# Patient Record
Sex: FEMALE | Race: WHITE | Employment: FULL TIME | URBAN - METROPOLITAN AREA
[De-identification: names, ages, dates, MRNs, and addresses within clinical notes are randomized per-mention and may not be internally consistent; named-entity substitution may affect disease eponyms.]

---

## 2017-04-19 NOTE — PATIENT INSTRUCTIONS
BARON Wood    Weight loss clinic  Bariatric surgery    [address] [facility] [address], [address]   Phone: [phone]   Fax: 23 [facility]  [address] [address] [place], [place]

## 2017-04-19 NOTE — PROGRESS NOTES
CC: Obesity, dysmenorrhea. HPI:  Pt presents for obesity problem. Pt has it for years, family history of morbid obesity. Risk factors: sedentary life style. Makes better: low calories diet and physical activities.   Makes worse: sugar craving a mood, affect. No orders of the defined types were placed in this encounter. ASSESSMENT AND PLAN:     Morbid obesity - Discussed BMI target.  Discussed calorie counting, diet, exercise, and one pound of weight loss per week by decreasing calorie in

## 2017-04-25 NOTE — ED NOTES
Patient instructed on how to use crutches, told tech she knows how to use them and refused instruction, walked out dragging crutches under arm

## 2017-04-25 NOTE — ED PROVIDER NOTES
Patient Seen in: [facility] Emergency Department In [place]    History   Patient presents with:  Lower Extremity Injury (musculoskeletal)    Stated Complaint: Left knee pain    HPI    71-year-old female that comes the hospital with complaint of having lef and agreed except as otherwise stated in HPI.     Physical Exam       ED Triage Vitals   BP 04/25/17 1553 153/75 mmHg   Pulse 04/25/17 1553 105   Resp 04/25/17 1553 18   Temp 04/25/17 1553 98.9 °F (37.2 °C)   Temp src 04/25/17 1553 Temporal   SpO2 --    O2 [address], [address] [facility] [address], [address] [address]  [phone]    Schedule an appointment as soon as possible for a visit in 2 days        Medications Prescribed:  Current Discharge Medication List    ST

## 2017-06-01 NOTE — PATIENT INSTRUCTIONS
Diony Espino, [address] [facility] [address], [address]  [phone]      Devika Lundberg 1640 Clinician,  [address] [place] [address] [facility]    Phone: [phone]  Fax: [phone]    Dr. Vickey Sawyer

## 2017-06-01 NOTE — PROGRESS NOTES
Laurie Smart is a 25year old female. Patient presents with:  Depression: Referral requested      HPI:   Pt here to with a new complain of depressed mood, anxiaty.  Pt feels down, no energy, sensee of failure, crying, loss of interest. This problem la sensation.     EXAM:   /78 mmHg  Pulse 86  Temp(Src) 98.7 °F (37.1 °C) (Oral)  Resp 22  Ht 65.5\"  Wt 269 lb  BMI 44.07 kg/m2  SpO2 100%  LMP 03/21/2017  GENERAL: well developed, well nourished,in no apparent distress  SKIN: no rashes,no suspicious le for treatment with medications. Risks and benefits of medication(s) discussed in detail, including possible worsening of depression and risk of suicidal thoughts.  If severe alterations in mentation occur patient instructed to stop medication and call offi

## 2017-06-05 NOTE — TELEPHONE ENCOUNTER
----- Message from Ruth Ann Luna MD sent at 6/2/2017  2:56 PM CDT -----  No STD, not immune to Hep B, needs the series.

## 2017-06-05 NOTE — TELEPHONE ENCOUNTER
Discussed results with patient and she will start the Hep B series when she comes to see Dr. Cazares Linear  7-3.

## 2017-07-03 PROBLEM — E66.01 MORBID OBESITY DUE TO EXCESS CALORIES (HCC): Status: ACTIVE | Noted: 2017-07-03

## 2017-07-03 NOTE — PROGRESS NOTES
CC: Patient presents with: Anxiety: f/u and weight check         HPI:  Pt lost 20  lbs. Pt exercises 3 times a week, watches carefully healthy diet. No chest pains, SOB, no palpitations. Tolerates Phentermine very well.     Pt  F/u anxiety, feeling Pulse 72   Temp 98.6 °F (37 °C) (Oral)   Ht 65.5\"   Wt 261 lb   BMI 42.77 kg/m²   GENERAL: well developed, well nourished,in no apparent distress  HEENT: atraumatic, normocephalic, throat is clear, PERRL  LUNGS: clear to auscultation  CARDIO: RRR without

## 2017-07-21 NOTE — PROGRESS NOTES
CHIEF COMPLAINT:   Patient presents with:  Ear Problem: Possible ear infection,Jaw pain. HPI:   Familia Sprevangelina is a 25year old female who presents for left ear pain for  3 days. Patient reports pain radiates to her left jaw.   She has been swimmin Smokeless tobacco: Never Used                      Comment: 4 cig per day  Alcohol use:  No                  REVIEW OF SYSTEMS:   GENERAL: Normal appetite  SKIN: no rashes or abnormal skin lesions  HEENT: See HPI  LUNGS: denies shortness of breath or wheezi Tory Loco is a 25year old female who presents with symptoms that are consistent with    ASSESSMENT:   Acute suppurative otitis media of left ear without spontaneous rupture of tympanic membrane, recurrence not specified  (primary encounter diagnosis · A cotton ball may be loosely placed in the outer ear to absorb any drainage. · You may use acetaminophen or ibuprofen to control pain, unless another medication was prescribed.  Note: If you have chronic liver or kidney disease or ever had a stomach ulce

## 2017-08-28 NOTE — PROGRESS NOTES
S:    Patient reports she has chest pressure x 2 days. She has also been coughing for 2 days. Patient reports she unable to sleep. Patient also reports wheezing and SOB at rest. No fevers, sore throat, sinus pain/pressure.     Patient did vomit once fro

## 2017-08-28 NOTE — ED PROVIDER NOTES
Patient Seen in: THE [facility] Emergency Department In [place]    History   Patient presents with:  Cough/URI    Stated Complaint: cough cold congestion     HPI    CHIEF COMPLAINT: Cough, congestion, chest pressure    HISTORY OF PRESENT ILLNESS: Patient is a rhinitis, cause unspecified    • B[facility]     NO MEDS DURING PREG   • Bronchitis, not specified as acute or chronic    • Gestational diabetes    • Infectious mononucleosis    • Otitis    • Pneumonia, organism unspecified(486)     as  141/94  Pulse: 93  Resp: 20  Temp: 98.7 °F (37.1 °C)  Temp src: Temporal  SpO2: 95 %  O2 Device: None (Room air)    Current:/94   Pulse 93   Temp 98.7 °F (37.1 °C) (Temporal)   Resp 20   Ht 167.6 cm (5' 6\")   Wt 118.4 kg   SpO2 95%   BMI 42.13 kg/m² Abnormality         Status                     ---------                               -----------         ------                     CBC W/ DIFFERENTIAL[[id number]]          Abnormal            Final result                 Please view r List    START taking these medications    Doxycycline Hyclate 100 MG Oral Cap  Take 1 capsule (100 mg total) by mouth 2 (two) times daily.   Qty: 20 capsule Refills: 0    albuterol sulfate (2.5 MG/3ML) 0.083% Inhalation Nebu Soln  Take 3 mL (2.5 mg total) b

## 2017-08-28 NOTE — ED INITIAL ASSESSMENT (HPI)
Cough, cold congestion for 2 days, no fever, went to urgent care, had breathing treatment \"that didn't help\"

## 2017-08-31 PROBLEM — J18.9 PNEUMONIA: Status: ACTIVE | Noted: 2017-08-31

## 2017-08-31 PROBLEM — F41.9 ANXIETY: Chronic | Status: ACTIVE | Noted: 2017-08-31

## 2017-08-31 NOTE — H&P
JOSE ANTONIO HOSPITALIST                                                               History & Physical         Jovana Snowball Patient Status:  Inpatient    1995 MRN [id number]   [facility] 5NW-A Attending Pedrito Canela MD   [address] Day # 0 Heart Attack Father    • Hypertension Father    • Other [OTHER] Maternal Grandmother    • Diabetes Paternal Grandmother    • Diabetes Maternal Grandfather        Social history:   reports that she quit smoking 7 days ago. She quit after 4.00 years of use. murmurs. Equal pulses   Abdomen: Soft, nontender, nondistended. Positive bowel sounds. No rebound tenderness  Neurologic: No focal neurological deficits. Musculoskeletal: Full range of motion of all extremities. No swelling noted.   Integument: No lesion status: Full  · Iyer: None    Plan of care discussed with patient, RN      Received signout from my hospitalist colleague who discussed with regular primary care physician Dr. Rayleen Boxer regarding  admission.   Dr. Angelia Stone will follow from morning sachin

## 2017-08-31 NOTE — PROGRESS NOTES
NURSING ADMISSION NOTE      Patient admitted via Wheelchair  Oriented to room. Safety precautions initiated. Bed in low position. Call light in reach. Patient admitted to room 519. Admission completed. MD notified.  Oxygen saturation 96% on room a

## 2017-08-31 NOTE — PROGRESS NOTES
Patient presents with:  ER F/U: THE [facility] Emergency room f/u from 8/28/17 pneumonia      HPI:   Fidelina Dave is a 25year old female who presents for cough  for  7  days.  Patient reports congestion, low grade fever, dry cough, cough is keeping pt up at nig of Onset   • Diabetes Father    • Heart Disorder Father    • Heart Attack Father    • Other [OTHER] Maternal Grandmother    • Diabetes Paternal Grandmother    • Diabetes Maternal Grandfather       Smoking status: Current Every Day Smoker Sodium Succ (Solu-MEDROL) injection 125 mg; Inject 2 mL (125 mg total) into the muscle once. -     ipratropium-albuterol (DUONEB) nebulizer solution 3 mL; Take 3 mL by nebulization once.      Smoking  -     MethylPREDNISolone Sodium Succ (Solu-MEDROL) inj

## 2017-08-31 NOTE — PROGRESS NOTES
[facility] Progress Note:  Anticoagulation Weight Dose Adjustment for enoxaparin (LOVENOX)    Chu Han is a 25year old female who has been prescribed enoxaparin (LOVENOX) 40 mg subcutaneous daily for VTE prophylaxis.       Estimated Creatinine Jl

## 2017-09-01 NOTE — PROGRESS NOTES
09/01/17 0645   Provider Notification   Reason for Communication Critical value   Test/Procedure Name respiratory panel   Provider Name dr Mau Hanley of Communication Page   Response Waiting for response   Notification Time 0645   dr Dereck thomas

## 2017-09-01 NOTE — PROGRESS NOTES
NURSING DISCHARGE NOTE    Discharged Home via Wheelchair. Accompanied by Support staff  Belongings Taken by patient/family. Patient alert and oriented. VSS. Iv discontinued. Discharge instructions and prescriptions given to patient.  She verbalized

## 2017-09-01 NOTE — PROGRESS NOTES
09/01/17 0645   Provider Notification   Reason for Communication Critical value   Test/Procedure Name respiratory panel   Provider Name dr Tati Farr of Communication Page   Response Waiting for response   Notification Time 0645   Dr Nolen Lennox p

## 2017-09-01 NOTE — PAYOR COMM NOTE
--------------  ADMISSION REVIEW         8/31         Attending Nicolette Bravo MD    Hosp Day # 0 PCP Linda Tabares MD      Chief Complaint: Shortness of breath, wheezing, Cough     History of Present Illness:  Angela Lares is a 25year old female ad noted.  Integument: No lesions. No erythema.  CXR  SLIGHT INFILTRATE VS ATELECTASIS LEFT LUNG BASE    LABS  + RHINOVIRUS  C02 17    ASSESSMENT / PLAN:      1. Left lower lobe pneumonia with acute bronchitis and bronchospasm  FAILED OP ABX,PREDNISONE  2.

## 2017-09-01 NOTE — PROGRESS NOTES
Patient feeling better; sating at 97% on room air; lungs clear bilaterally to auscultation; wants to go home. Will d/c today on levaquin,steroids. She improved quicker than expected for diagnosis of pneumonia.     Shaila Davila MD  [facility]  Intern

## 2017-09-02 NOTE — DISCHARGE SUMMARY
[facility] [place] HOSPITALIST  DISCHARGE SUMMARY     [facility] Patient Status:  Inpatient    1995 MRN [id number]   [facility] 5NW-A Attending Diya Kitchen MD   Hosp Day # 1 PCP Silverio Barnard MD     Date of Admission: 2017  Date o Denies any dysuria frequency. Brief Synopsis: admitted for symptoms shown above in HPI, suspected to have both bacterial pneumonia and rhino/enterovirus bronchitis, the latter being found on pcr.   Started on azithromycin and steroids and seemed to do w 9/27/2017  Quantity:  30 capsule  Refills:  0     clonazePAM 0.25 MG Tbdp  Commonly known as:  KLONOPIN      Take 1 tablet (0.25 mg total) by mouth 2 (two) times daily as needed.    Quantity:  60 tablet  Refills:  5        STOP taking these medications

## 2017-09-27 NOTE — PROGRESS NOTES
Patient presents with:  Hospital F/U: [facility] admission Pneumonia 8/ 31/2017       HPI:   Chino Jonyer is a 25year old female who presents for pneumonia f/u from the hospital.  Cough is improving, now mild,intermittent, pt still on antibiotics. Grandfather       Smoking status: Former Smoker                                                              Packs/day: 0.00      Years: 4.00         Quit date: 8/24/2017  Smokeless tobacco: Never Used                      Comment: 4 cig per day  Alcohol u above plan and agreed  Referral to gyn for IUD, no Phentermine.     Signed Prescriptions Disp Refills    Phentermine HCl 37.5 MG Oral Tab 30 tablet 0      Sig: Take 1 tablet (37.5 mg total) by mouth every morning before breakfast.         Pneumonia LLL: res

## 2017-09-27 NOTE — PATIENT INSTRUCTIONS
Ya Ulloa MD  Obstetrics/Gynecology     [address] [facility]  Phone: [phone]  Fax: [phone]    [address]  Phone: [phone]  Fax: [phone]

## 2017-09-28 NOTE — TELEPHONE ENCOUNTER
PT had appointment with doctor on 9/27/17 and was told she needs to take Wellbutrin and a water pill. PT wasn't sure about the spelling of either one. Please call PT back. Can call her back on her cell phone.

## 2017-09-28 NOTE — TELEPHONE ENCOUNTER
Patient seen in office yesterday. Given scripts for phentermine and alprazolam.     1. Is she to continue on bupropion? 2. Is she to start a diuretic? Please advise. Thank you!

## 2017-11-06 PROBLEM — E66.01 MORBID OBESITY (HCC): Status: ACTIVE | Noted: 2017-11-06

## 2017-11-06 NOTE — PROGRESS NOTES
Tory Loco is a 25year old female. Patient presents with: Follow - Up      HPI:   Pt complains of symptoms of anxiety.    Has been having these sx's for feeling anxious almost every day, palpitations, feeling fullness in the stomach, closing throa cig per day  Alcohol use:  Yes              Comment: socially        REVIEW OF SYSTEMS:   GENERAL HEALTH: feels well otherwise  RESPIRATORY: denies shortness of breath with exertion  CARDIOVASCULAR: denies chest pain on exertion  GI: denies abdominal pain a by decreasing calorie intake by 500 calories per day. Handout given. . All side effects of Phentermine d/w the pt, heart problems possible  in the future, not known effect were explained. Exercise at least 3 times per week, 30min and low calories diet d/w t

## 2017-11-06 NOTE — PATIENT INSTRUCTIONS
Dr. Illene Carrel Gynecology    [address] [facility] [address]  [place], [address]  [phone] [address] [facility]  [address]  [place], [place]  Phone: [phone] [facility], [address]  Phone: [phone].

## 2017-12-26 NOTE — TELEPHONE ENCOUNTER
Dr. Ness Cons  Dr. Kaylie Le. [address] [facility] [address]  [place], [address]  [phone]    [address] [facility]  [address]  [place], [place]  Phone: [phone] [place] [address], [address]

## 2017-12-26 NOTE — TELEPHONE ENCOUNTER
Pt requesting contact information for ob/gyn she was referred to during her OV. Please advise thank you.

## 2018-01-10 NOTE — PROGRESS NOTES
CC: Patient presents with:  Medication Follow-Up         HPI:  Pt lost 9  lbs. Pt exercises 3 times a week, watches carefully healthy diet. No chest pains, SOB, no palpitations. Tolerates Phentermine very well.     Rash, breast bilaterally, some itch Anxiety     Morbid obesity ([facility] [address] [facility]        REVIEW OF SYSTEMS:   RESPIRATORY: denies shortness of breath with exertion  CARDIOVASCULAR: denies chest pain on exertion, palpitations. SKIN: as above  PSYCH: no low self est. No depression.     EXAM:   /76

## 2018-01-23 NOTE — PROGRESS NOTES
CHIEF COMPLAINT:   Patient presents with:  Urinary: pressure and urgency 3 days. HPI:   Silva Paget is a 25year old female who presents with symptoms of UTI. Complaining of urinary frequency, urgency, and dysuria for last 3 days.  Symptoms have be • Pneumonia, organism unspecified(486)     as     • Swollen tonsil       Social History:  Smoking status: Current Every Day Smoker                                                   Packs/day: 0.00      Years: 4.00         Last attempt to quit:  Elsa Crews is a 25year old female presents with UTI symptoms. ASSESSMENT:  Dysuria  (primary encounter diagnosis)    PLAN: Meds as listed below with meals. Rest and increase fluid intake. Comfort measures as described in Patient Instructions.   [facility] · Pyelonephritis. This is a kidney infection. If not treated, it can be serious and damage your kidneys. In severe cases, you may need to stay in the hospital. You may have a fever and lower back pain.   Medicines to treat a UTI  Most UTIs are treated with Date Last Reviewed: 1/1/2017  © 9486-6249 [employer] [address]. [address] [facility], [address] [place]. All rights reserved. This information is not intended as a substitute for professional medical care.  Always follow your healthcare professional'

## 2018-01-24 NOTE — TELEPHONE ENCOUNTER
Patient states she was seen through the walk in and her pain is way worse today it is unbearable pain in her lower back patient would like to know what she should do at this point

## 2018-01-24 NOTE — TELEPHONE ENCOUNTER
Patient seen in [facility] yesterday for UTI. Prescribed Macrobid and Pyridium. Had mild lower back pain at the time. Has started medications. Today back pain has significantly  increased rating 6-7/10. Feels like a pressure going half way up her back.  Has been u

## 2018-01-25 NOTE — TELEPHONE ENCOUNTER
Unable to reach patient at number on file. Left message to call back. Will vivien for f/u to call patient back later this afternoon if she doesn't return call.

## 2018-01-25 NOTE — TELEPHONE ENCOUNTER
Please see below. Patient's urine culture was negative for UTI. She was switched to Cipro yesterday d/t complaints of increasing low back pain. She is still symptomatic. Should she d/c Cipro? Any additional orders for patient?

## 2018-01-25 NOTE — TELEPHONE ENCOUNTER
Pt called, she just received a call from [facility] with her test results: she is negative for a UTI. Dr. Rigo Hawkins changed her Rx yesterday to Cipro, but now she is wondering: if its not a UTI, will this antibiotic even help?  She is still experiencing the pain, so she w

## 2018-01-25 NOTE — TELEPHONE ENCOUNTER
Patient called back. States no stat appointments available today. Offered patient note for work, states employer doesn't care if she brings in note. Patient advised that per provider ER was also an option for the testing.  Patient began to cry and stated

## 2018-01-25 NOTE — TELEPHONE ENCOUNTER
Spoke to patient. Advised of recommendations below. Provided with number to central scheduling to set up STAT KUB. Patient will call to schedule today.  Will try to have labs done today as well, though she states she is working until 4 and doesn't think she

## 2018-01-26 NOTE — ED NOTES
Pt expressed to RN that she can't wait much longer due to her child that is with her. Pt willing to wait 15 more minutes for final CT results. Dr Arnaldo Chase aware. PIV dc.

## 2018-01-26 NOTE — ED NOTES
Pt crying stating \"I wasted 4 hours for nothing\" RN tried explaining dx to patient, patient's son, throwing gloves at [facility] and talking loudly. Pt told RN \" I just want to go\" RN told patient about meds and to follow up ,if she becomes worse to return.  Pt

## 2018-01-26 NOTE — TELEPHONE ENCOUNTER
Pt states she's in a lot of pain. Pt states she was here for a UTI but it wasn't a UTI. Pt would like to know what she should do. Pls call pt.

## 2018-01-26 NOTE — ED PROVIDER NOTES
Patient Seen in: THE [facility] Emergency Department In [place]    History   Patient presents with:  Abdomen/Flank Pain (GI/)    Stated Complaint: UTI symptoms and flank pain. HPI    Patient is a 43-year-old female presents with back pain.   Patient says above.    Physical Exam   ED Triage Vitals [01/25/18 1803]  BP: 131/81  Pulse: 99  Resp: 20  Temp: 98.3 °F (36.8 °C)  Temp src: Temporal  SpO2: 95 %  O2 Device: None (Room air)    Current:/81   Pulse 79   Temp 98.3 °F (36.8 °C) (Temporal)   Resp 18 21-year-old female with urinary symptoms, bilateral flank pain, suprapubic tenderness. Urine cultures were negative for infection. Says the symptoms are worsening. She was sent in to rule out kidney stone. Discussed options with patient.   Will hydrate,

## 2018-01-26 NOTE — TELEPHONE ENCOUNTER
Pt notified of below orders. Rx sent. Pt will call office back to schedule appt. All questions answered, pt expresses understanding.

## 2018-01-26 NOTE — TELEPHONE ENCOUNTER
Pt states she continues to have low back pain, frequency  & bladder pressure. She was seen in [facility] on 1/23 & in ED on 1/25 for this. Negative urine culture, negative CT of abdomen & pelvis.   The Pyridium helps her urinary discomfort, Ibuprofen is not help

## 2018-02-22 NOTE — TELEPHONE ENCOUNTER
The patient called and stated she is having a knee problem for awhile. She made an appointment then cancelled and scheduled with ortho. Today and they did x-rays.  Patient received antibiotic and PT however the patient is in a lot of pain and needing someth

## 2018-02-22 NOTE — TELEPHONE ENCOUNTER
MRI of left knee placed in [facility]. Detailed message left on pt's VM per HIPPA of MRI order, # for Central Scheduling given. Continue Dr. Alysha Collins orders & keep appt with Dr. Minna Chen for f/u. Call office back with any questions.

## 2018-02-22 NOTE — TELEPHONE ENCOUNTER
Pt saw Dr. Saima Jameson today for her Left knee pain, she states her Xray was normal, he ordered ice, Medrol june,  & PT. She states her knee pain is so bad she can not put weight on it & has trouble walking.   She is asking if there is anything else she should do

## 2018-02-24 NOTE — PROGRESS NOTES
Patient presents with:  Knee Pain: Lt. Knee pain       HPI: L knee  has been hurting for 3 years but very significant pain for last one month . Dull in character. 10/10. No weakness. Medial side of the knee. No numbness or tingling. Worsening.  Movement m

## 2018-03-02 NOTE — TELEPHONE ENCOUNTER
PSR please [facility] triage    VM left for patient to call back    I called Dr.Lombardi's office and spoke with Itzel Hernandez and scheduled an appointment for her on Monday 12:30pm. I will try and get in touch w/ patient today and let her know of the upcoming appointm

## 2018-03-02 NOTE — TELEPHONE ENCOUNTER
Patient calling again I did disclose that doctor is out of the office and she may not get a call today.  She said she's just wondering as she states her knee is hurting pretty bad

## 2018-03-02 NOTE — TELEPHONE ENCOUNTER
Pt is requesting her 3/1 MRI of knee results. Please review & advise on orders for pt, she states her knee is hurting bad. LOV 2/24, pt given Vimovo samples & Tramadol Rx then.

## 2018-03-02 NOTE — TELEPHONE ENCOUNTER
Spoke w/ patient. Advised her of MRI findings and that she needs to see an orthopedic surgeon for possible infection. She is aware that I scheduled an appointment for her w/  at his [place] location for Monday 3/5/18 at 12:30.  I asked her to tere

## 2018-03-02 NOTE — TELEPHONE ENCOUNTER
Inflammation of subcu tissue   No abn of ligaments or bone  Get CBC, CMP, ESR, CRP  Refer to ortho - next available appt - possible infection

## 2018-03-08 PROBLEM — M25.462 SWELLING OF JOINT OF LEFT KNEE: Status: ACTIVE | Noted: 2018-03-08

## 2018-03-12 NOTE — TELEPHONE ENCOUNTER
Called patient and left message per HIPAA of results below. Advised patient to contact the office with any questions.

## 2018-05-03 NOTE — TELEPHONE ENCOUNTER
ALPRAZolam 0.5 MG Oral Tab Sig :  Take 1 tablet (0.5 mg total) by mouth 2 (two) times daily as needed. BID prn.    spironolactone 50 MG Oral Tab Sig :  Take 1 tablet (50 mg total) by mouth daily.     BuPROPion HCl ER, XL, 300 MG Oral Tablet 24 Hr Sig :  Omid

## 2018-05-04 NOTE — TELEPHONE ENCOUNTER
I contacted Jenny Castro at the pharmacy and confirmed that pt has plenty of spironolactone & Bupropion-was given 90 days with 3 rf on 1/10/18 of both. The pharmacy will get those 2 ready.       Pt needs Phentermine   LF 1/10 # 30 with 1 rf    And Alprazolam

## 2018-06-27 NOTE — PATIENT INSTRUCTIONS
Viral Bronchitis (Adult)    You have a viral bronchitis. Bronchitis is inflammation and swelling of the lining of the lungs. This is often caused by an infection. Symptoms include a dry, hacking cough that is worse at night.  The cough may bring up yellow · Over-the-counter cough, cold, and sore-throat medicines will not shorten the length of the illness, but they may help to reduce symptoms. Don't use decongestants if you have high blood pressure.   Follow-up care  Follow up with your healthcare provider, o A test has been done to find out whether you (or your child, if your child is the patient) have strep throat. Call this facility or your healthcare provider if you were not given your test results.  If the test is positive for strep infection, you will need · Use throat lozenges or numbing throat sprays to help reduce pain. Gargling with warm salt water will also help reduce throat pain. For this, dissolve 1/2 teaspoon of salt in 1 glass of warm water.  To help soothe a sore throat, children can sip on juice o You have a viral upper respiratory illness (URI), which is another term for the common cold. This illness is contagious during the first few days. It is spread through the air by coughing and sneezing.  It may also be spread by direct contact (touching the · Cough with lots of colored sputum (mucus)  · Severe headache; face, neck, or ear pain  · Difficulty swallowing due to throat pain  · Fever of 100.4°F (38°C) or higher, or as directed by your healthcare provider  Call 911  Call 911 if any of these occur:

## 2018-06-27 NOTE — PROGRESS NOTES
CHIEF COMPLAINT:   Patient presents with:  Cough: x 3 days   Sore Throat    HPI:   Reji  is a 26year old female who presents to clinic with symptoms of sore throat. Patient has had for 3 days. Symptoms have been worse since onset.   Patient repor Nystatin Does not apply Powder Apply 1 Application topically 3 (three) times daily as needed.  Disp: 1 Bottle Rfl: 4      Past Medical History:   Diagnosis Date   • ADHD (attention deficit hyperactivity disorder)      NO MEDS DURING PREG   • Allergic rhinit THROAT: oral mucosa pink, moist. Posterior pharynx mild erythematous. no exudates. Tonsils absent. No uvular deviation. No drooling. NECK: supple  LUNGS: clear to auscultation bilaterally, no wheezes or rhonchi. Breathing is non labored.   CARDIO: RRR with Sig: As directed. Albuterol Sulfate HFA (PROAIR HFA) 108 (90 Base) MCG/ACT Inhalation Aero Soln 1 Inhaler 0      Sig: Inhale 2 puffs into the lungs every 4 (four) hours as needed for Wheezing or Shortness of Breath. Meds as listed above. · If symptoms are severe, rest at home for the first 2 to 3 days. When you go back to your usual activities, don't let yourself get too tired. · Do not smoke. Also avoid being exposed to secondhand smoke.   · You may use over-the-counter medicine to [facility] Call 911 if any of these occur:  · Coughing up blood  · Worsening weakness, drowsiness, headache, or stiff neck  · Trouble breathing, wheezing, or pain with breathing  Date Last Reviewed: 9/13/2015  © 9859-9170 [employer] [address].  [address] · Take the antibiotic medicine for the full 10 days, even if you feel better. This is very important to make sure the infection is treated.  It is also important to prevent drug-resistant germs from developing. If you were given an antibiotic shot, you won' ¨ Your child is 3years old or older and has a fever of 100.4°F (38°C) that continues for more than 3 days.   · New or worsening ear pain, sinus pain, or headache  · Painful lumps in the back of neck  · Stiff neck  · Lymph nodes are getting larger  · Inabil · You may use acetaminophen or ibuprofen to control pain and fever, unless another medicine was prescribed. If you have chronic liver or kidney disease, have ever had a stomach ulcer or gastrointestinal bleeding, or are taking blood-thinning medicines, pedro

## 2018-06-30 NOTE — TELEPHONE ENCOUNTER
Per FYI/HIPPA on file left WNL lab results on patient's voicemail. Instructed patient to call [phone] with any questions or concerns.

## 2018-09-26 NOTE — ED PROVIDER NOTES
Patient Seen in: [facility] In [place]    History   Patient presents with:  Syncope (cardiovascular, neurologic)    Stated Complaint: \"I passed out twice today in the store while vomiting\",     HPI    Patient is a pleasant 23-year-o signs reviewed. All other systems reviewed and negative except as noted above.     Physical Exam     ED Triage Vitals [09/26/18 1556]   /73   Pulse 114   Resp 16   Temp 98.2 °F (36.8 °C)   Temp src Oral   SpO2 98 %   O2 Device None (Room air) Abnormal; Notable for the following components:    POC Glucose 108 (*)     All other components within normal limits   CBC W/ DIFFERENTIAL - Abnormal; Notable for the following components:    WBC 13.6 (*)     RBC 5.39 (*)     Neutrophil Absolute Prelim 8.9 is no hemorrhage or mass lesion. SINUSES:           Bilateral maxillary and ethmoidal mucosal thickening. MASTOIDS:          No sign of acute inflammation. SKULL:             No evidence for fracture or osseous abnormality. OTHER:             None.       C

## 2018-09-27 NOTE — TELEPHONE ENCOUNTER
LVM for patient to call back to see if she needs to make a follow up ER with Dr Amy Conroy for passing out and vomiting in a store.

## 2018-09-27 NOTE — TELEPHONE ENCOUNTER
Torsten Anguiano M.D.     Bariatric surgery   [address]  [address]  [address]  Phone: [phone] [address]  [address]  [place] [facility] [address]  Tracey Mendez M.D   Surgery Gener

## 2018-09-27 NOTE — TELEPHONE ENCOUNTER
Pt called asking for the name of the doctor that Dr. Zackary Merlin referred her to 8 months ago for weight loss surgery. I didn't see anything. Pls call pt.

## 2018-11-06 NOTE — PROGRESS NOTES
Felipa Cheadle is a 26year old female who presents for a complete physical exam, no gyn. HPI:     Patient presents with:  Physical      Patient feels well, dental visit up to date, no hearing problem. Vaccinations up to date.   Pt has chronic L knee p not specified as acute or chronic    • Gestational diabetes    • Infectious mononucleosis    • Otitis    • Pneumonia    • Pneumonia, organism unspecified(486)     as     • Swollen tonsil       Past Surgical History:   Procedure Laterality Date   • C kg/m²      General: WD/WN in no acute distress. HEENT: PERRLA and EOMI. OP moist no lesions. Neck is supple, with no cervical LAD or thyroid abnormalities. No carotid bruits.     Lungs: are clear to auscultation bilaterally, with no wheeze, rhonchi, or Refills   • ALPRAZolam 0.5 MG Oral Tab 60 tablet 1     Sig: Take 1 tablet (0.5 mg total) by mouth 2 (two) times daily as needed. • spironolactone 50 MG Oral Tab 90 tablet 3     Sig: Take 1 tablet (50 mg total) by mouth daily.    • BuPROPion HCl ER, XL, 30

## 2018-12-15 NOTE — PATIENT INSTRUCTIONS
Water on the Knee    Water on the knee is also known as knee effusion. The knee joint normally has less than 1 ounce of fluid. Injury or inflammation of the knee joint causes extra fluid to collect there.  When this happens, the knee joint looks swollen a · You may use over-the-counter pain medicine to control pain, unless another pain medicine was prescribed.  If you have chronic liver or kidney disease or have ever had a stomach ulcer or GI bleeding, talk with your healthcare provider before using these me
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
## 2018-12-20 NOTE — TELEPHONE ENCOUNTER
Try to call patient's number and it was temporarily out of service. Patient is a no show and will have the no show fee added to her account.

## 2019-04-26 PROBLEM — M54.2 NECK PAIN ON LEFT SIDE: Status: ACTIVE | Noted: 2019-04-26

## 2019-04-26 PROBLEM — L70.0 ACNE VULGARIS: Status: ACTIVE | Noted: 2019-04-26

## 2019-04-26 NOTE — PROGRESS NOTES
CC: Patient presents with:  Medication Follow-Up       HPI:  Pt presents for obesity problem. Pt has it for years. Risk factors: sedentary life style. Makes better: low calories diet and physical activities.   Makes worse: sugar craving and no exer excess calories (HCC)     Pneumonia     Anxiety     Morbid obesity (HCC)     Swelling of joint of left knee        REVIEW OF SYSTEMS:   GEN: no fatigue  RESPIRATORY: denies shortness of breath with exertion or rest  CARDIOVASCULAR: denies chest pain on exe

## 2019-09-10 NOTE — PATIENT INSTRUCTIONS
-Cool mist humidifier at night  -Warm tea with honey  -Mucinex  -Flonase            Viral Upper Respiratory Illness (Adult)    You have a viral upper respiratory illness (URI), which is another term for the common cold.  This illness is contagious during th provider or pharmacist which over-the-counter medicines are safe to use. (Note: Don't use decongestants if you have high blood pressure.)  Follow-up care  Follow up with your healthcare provider, or as advised.   When to seek medical advice  Call your healt

## 2019-09-10 NOTE — PROGRESS NOTES
CHIEF COMPLAINT:   Patient presents with:  Nasal Congestion: Post nasal drip, bilateral ear pain, coughing up flem, unable to sleep through the night, X 4 days       HPI:   Jovana Coello is a 22year old female who presents for URI sxs for  4 days.   Veronique • Bipolar 1 disorder (HCC)     NO MEDS DURING PREG   • Bronchitis, not specified as acute or chronic    • Gestational diabetes    • Infectious mononucleosis    • Otitis    • Pneumonia    • Pneumonia, organism unspecified(486)     as     • Swollen to EXTREMITIES: no cyanosis, clubbing or edema  LYMPH:  No anterior cervical lymphadenopathy. No submandibular lymphadenopathy. No posterior cervical or occipital lymphadenopathy. No results found for this or any previous visit (from the past 24 hour(s)). · Don't smoke. If you need help stopping, talk with your healthcare provider. · Avoid being exposed to cigarette smoke (yours or others’).   · You may use acetaminophen or ibuprofen to control pain and fever, unless another medicine was prescribed. If you © 6238-9333 [employer]. [facility] [facility] [place] [address]. All rights reserved. This information is not intended as a substitute for professional medical care. Always follow your healthcare professional's instructions.             The

## 2019-09-13 NOTE — PROGRESS NOTES
Kirby Gudino is a 22year old female. Patient presents with:  Medication Request    HPI:    Symptoms started  10  days ago with purulent nasal discharge, maxillary sinus pressure, and headache, a lot of  post-nasal drip. Worsening.     Pt presents for Past Medical History:   Diagnosis Date   • ADHD (attention deficit hyperactivity disorder)      NO MEDS DURING PREG   • Allergic rhinitis, cause unspecified    • Anxiety    • [facility]     NO MEDS DURING PREG   • Bronchitis, not specified d/w the pt. Handouts given. Pt understands all the directives. Patient understood above plan and agreed      Requested Prescriptions     Signed Prescriptions Disp Refills   • ALPRAZolam 0.5 MG Oral Tab 60 tablet 3     Sig: Take 1 tablet (0.5 mg total) by mo

## 2019-10-21 PROBLEM — K52.9 COLITIS: Status: ACTIVE | Noted: 2019-10-21

## 2019-10-21 NOTE — ED PROVIDER NOTES
Patient Seen in: [facility] In [place]      History   Patient presents with:  Nausea/Vomiting/Diarrhea (gastrointestinal)    Stated Complaint: vomiting    HPI    13-year-old female presents with vomiting and diarrhea that began 2 days except as noted above.     Physical Exam     ED Triage Vitals [10/21/19 1724]   /67   Pulse (!) 136   Resp 20   Temp (!) 102.5 °F (39.2 °C)   Temp src Oral   SpO2 98 %   O2 Device None (Room air)       Current:/60   Pulse 110   Temp (!) 102.5 °F other components within normal limits   CBC WITH DIFFERENTIAL WITH PLATELET    Narrative: The following orders were created for panel order CBC WITH DIFFERENTIAL WITH PLATELET.   Procedure                               Abnormality         Status pancreas is obscured by bowel gas. SPLEEN:  Normal. KIDNEYS:  No hydronephrosis. Right kidney measures 12.5 cm. Left kidney measures 11.3 cm. AORTA/IVC:  Patent. CONCLUSION:  Tail of pancreas is obscured by bowel gas.   No evidence of cholelithiasis mesenteric lymph nodes. ABDOMINAL WALL:  No ventral wall hernia. PELVIC ORGANS:  IUD in the mid to lower uterine segment. BONES:  Mild degenerative changes in the lower lumbar facets.        CONCLUSION:  Pan colitis most severe involving the distal transv

## 2019-10-22 PROBLEM — D72.829 LEUKOCYTOSIS, UNSPECIFIED TYPE: Status: ACTIVE | Noted: 2019-10-22

## 2019-10-22 PROBLEM — R19.7 INTRACTABLE DIARRHEA: Status: ACTIVE | Noted: 2019-10-22

## 2019-10-22 PROBLEM — R11.2 INTRACTABLE VOMITING WITH NAUSEA, UNSPECIFIED VOMITING TYPE: Status: ACTIVE | Noted: 2019-10-22

## 2019-10-22 NOTE — H&P
[facility] HOSPITALIST  History and Physical     Sanchez Paget Patient Status:  Emergency    1995 MRN [id number]   Location [facility] Attending Charleen Dalal MD   Hosp Day # 0 PCP Jolene Brown MD     Chief Compl • Hypertension Father    • Other (Other) Maternal Grandmother    • Diabetes Paternal Grandmother    • Diabetes Maternal Grandfather         Allergies:   Amoxicillin             RASH  Augmentin [Amoxicil*    RASH  Doxycycline             NAUSEA ONLY  Peni Appropriate mood and affect.       Diagnostic Data:      Labs:  Recent Labs   Lab 10/21/19  1742   WBC 30.5*   HGB 16.7*   MCV 85.8   .0       Recent Labs   Lab 10/21/19  1742   *   BUN 10   CREATSERUM 1.05*   GFRAA 87   GFRNAA 75   CA 8.7   A

## 2019-10-22 NOTE — PAYOR COMM NOTE
--------------  ADMISSION REVIEW     Payor: [facility] #:  [id number]  Authorization Number: N/A    ED Provider Notes      Patient Seen in: [facility] In [place]      History   Patient presents with: SpO2 98 %   O2 Device None (Room air)       Current:/60   Pulse 110   Temp (!) 102.5 °F (39.2 °C) (Oral)   Resp 18   Wt 113.4 kg   SpO2 99%   BMI 40.35 kg/m²          Physical Exam    General:  Vitals as listed.   Appears ill  HEENT: Sclerae anicter DIFFERENTIAL WITH PLATELET.   Procedure                               Abnormality         Status                     ---------                               -----------         ------                     CBC W/ DIFFERENTIAL[[id number]]          Abnormal Patent. CONCLUSION:  Tail of pancreas is obscured by bowel gas. No evidence of cholelithiasis or biliary ductal dilatation.     Dictated by: Shaila Dodd MD on 10/21/2019 at 19:06     Approved by: Shaila Dodd MD on 10/21/2019 at 19:07 in the lower lumbar facets. CONCLUSION:  Pan colitis most severe involving the distal transverse colon and splenic flexure. Normal caliber appendix. Numerous prominent mesenteric and retroperitoneal lymph nodes.   IUD in the mid to lower uterine seg nontender, nondistended. Positive bowel sounds. No rebound, guarding or organomegaly. Neurologic: No focal neurological deficits. CNII-XII grossly intact. Musculoskeletal: Moves all extremities. Extremities: No edema or cyanosis.   Integument: No rashes Information: Stool        Component  Ref Range & Units    GI Panel Comment: Please Note: This test no longer includes C.difficile toxin. If clinically indicated order separate test for C.difficile toxin.  P      Campylobacter Pcr  Negative Negative P   Ples

## 2019-10-22 NOTE — ED INITIAL ASSESSMENT (HPI)
Report called to [facility] Back for room 4428. Pt will be going by family car after antibiotics are done.

## 2019-10-22 NOTE — PLAN OF CARE
Patient alert and oriented x4, VS stable, RA, , no tele  Complaint of abdominal pain noted, Toradol prn given accordingly  IV ABX  Patient is positive for salmonella, informed this afternoon  GI follows  Multiple episodes of diarrhea and emesis today  U

## 2019-10-22 NOTE — PROGRESS NOTES
NURSING ADMISSION NOTE      Patient admitted via Cart  Oriented to room. Safety precautions initiated. Bed in low position. Call light in reach. Pt A&Ox4 Room Air  Admitted due to vomiting and diarrhea for the last 2 days.    C-diff R/O (-)  IV li

## 2019-10-22 NOTE — CONSULTS
[facility]                       Gastroenterology [address] [facility] Gastroenterology    Felipa Cheadle Patient Status:  Inpatient    1995 MRN [id number]   [facility] 3NE-A Attending [address] MD   Hosp Day # 0 JASON Davila • Infectious mononucleosis    • Otitis    • Pneumonia    • Pneumonia, organism unspecified(486)     as     • Swollen tonsil      PSHx:   Past Surgical History:   Procedure Laterality Date   •      •  SECTION N/A 2014    Perfor gastrointestinal malignancies; No family history of ulcer disease, or inflammatory bowel disease  ROS:  In addition to the pertinent positives described above:             Infectious Disease:  No chronic infections or recent fevers prior to the acute illne No peripheral edema or cyanosis  Skin: Warm and dry  Psychiatric: Appropriate mood and congruent affect without obvious depression or anxiety  Labs:   Lab Results   Component Value Date    WBC 19.5 10/22/2019    HGB 15.3 10/22/2019    HCT 48.8 10/22/2019 caliber. KIDNEYS:  No hydronephrosis or focal renal mass. ADRENALS:  Normal.  AORTA/VASCULAR:  No aneurysm.   RETROPERITONEUM:  Several mildly prominent periaortic lymph nodes largest on the left measuring 1.3 x 0.8 cm   BOWEL/MESENTERY:  Normal caliber a Gastroenterology  [phone]    GI attending addendum:    I have personally seen and examined this patient and agree with above nurse practitioner's assessment and recommendations.      Briefly, this is a 21 yo F w/ho bipolar disorder  presenting with nadja

## 2019-10-23 NOTE — PLAN OF CARE
Pt A&Ox4 Room Air  Resting in bed  Meds given per Mar  PRN Med given for pain(Toradol)  (+) for salmonella  Electrolyte protocol   On consult GI  Safety precaution maintained  Will continue to monitor    Problem: GASTROINTESTINAL - ADULT  Goal: Minimal or Fluid restriction as ordered  - Instruct patient on fluid and nutrition restrictions as appropriate  Outcome: Progressing

## 2019-10-23 NOTE — DISCHARGE SUMMARY
[facility] HOSPITALIST  DISCHARGE SUMMARY     Kirby Gudino Patient Status:  Inpatient    1995 MRN [id number]   [facility] 3NE-A Attending Abhay Samuel, [address] [facility] Day # 1 PCP Kristal Kamara MD     Date of Admission: 10/21/2019  Date o 28 tablet  Refills:  0        CONTINUE taking these medications      Instructions Prescription details   ALPRAZolam 0.5 MG Tabs  Commonly known as:  XANAX      Take 1 tablet (0.5 mg total) by mouth 2 (two) times daily as needed.    Quantity:  60 tablet  Re electronic chart    Vince Owens DO    Time spent:  > 30 minutes

## 2019-10-23 NOTE — PROGRESS NOTES
NURSING DISCHARGE NOTE    Discharged Home via Wheelchair. Accompanied by Friend  Belongings Taken by patient/family. IV sites discontinued. Discharge instructions, follow up, and medications were discussed with patient.  Work note provided to [employer]

## 2019-10-23 NOTE — PROGRESS NOTES
Gastroenterology Progress Note  S: Pt doing much better today with no diarrhea, improved abd pain. Pt is anxious to go home, but has not eaten much over the past 24 hours.    O: /54 (BP Location: Left arm)   Pulse 85   Temp 98.3 °F (36.8 °C) (Oral)

## 2019-10-23 NOTE — PLAN OF CARE
Patient is a&ox4. Vital signs stable. Complaints of abdomen pain this AM. Toradol IVP PRN per MAR. Denies any complaints of nausea. States she has not had an incontinent episode of diarrhea since last night. Patient anxious for discharge.  Started on low fi Administer ordered medications to maintain glucose within target range  - Assess barriers to adequate nutritional intake and initiate nutrition consult as needed  - Instruct patient on self management of diabetes  Outcome: Progressing  Goal: Electrolytes m

## 2020-01-03 NOTE — TELEPHONE ENCOUNTER
New or ongoing issue: new    Brief description of symptoms: cold sore    Approximately how long? : couple of days, it is not on surface yet but Pt feels it coming and would like to prevent it    Offered appointment:     Appointment scheduled:

## 2020-01-27 NOTE — PROGRESS NOTES
Patient presents with:  Medication Request        HPI:    Patient complains insomnia. Problems with fall asleep and stay asleep. Started gradualy. Last months, moderate, worsening. Stress makes it worse, pt tried OTC meds that not helping.   Noy leo tablet (37.5 mg total) by mouth every morning before breakfast. 30 tablet 2   • Phentermine HCl 37.5 MG Oral Tab Take 1 tablet (37.5 mg total) by mouth every morning before breakfast. 30 tablet 1      Past Medical History:   Diagnosis Date   • ADHD (attent no thyromegaly. LUNGS: CTA bilaterally, normal air exchange, no rhonchi, no rales, or wheezes  HEART: RRR, Normal S1, S2, No S3/S4, no heave or thrill. Carotids without bruit bilaterally. LYMPHATIC: no LAD supraclavicular or axilla bilaterally.   EXT: no 1 tablet (37.5 mg total) by mouth every morning before breakfast.

## 2020-01-27 NOTE — PATIENT INSTRUCTIONS
Dr. Digna Leach. Obstetrics & Gynecology    [address] [facility] [address]  [place], [address]  [phone] [address] [facility]  [address]  [place], [place]  Phone: [phone] [address] Jaylyn Johnson

## 2020-02-18 NOTE — TELEPHONE ENCOUNTER
Pt called, she has been taking Zolpidem Tartrate 10 MG Oral Tab as rx'd by Dr. Cory Darby since 1/27/2020. She states that it has been helping her fall asleep, but she has been having terrible nightmares and sleep paralysis.  She is wondering if Dr. Kevin Raya

## 2020-02-18 NOTE — TELEPHONE ENCOUNTER
Rx called in.  Zolpidem cancelled. Pt notified of orders. All questions answered, pt expresses understanding.

## 2020-03-12 NOTE — PROGRESS NOTES
HPI:   Yunior Candelario is a 26year old female who presents with ill symptoms for  2  days. Patient reports generalized fatigue, chest heaviness with mild back pain, denies cough or fever, describes tactile fever and mild diarrhea. No flu shot this season. IUD insertion 3/20/18 per Margaret/Dr. Lopez's office   • ENDOSCOPIC ULTRASOUND EXAM  2011   • TONSILLECTOMY        Family History   Problem Relation Age of Onset   • Diabetes Father    • Heart Disorder Father    • Heart Attack Father    • Hypertension Fat Offered xofluza, patient declines. Smoking cessation discussed briefly. Note given for missing work. Self care discussed. Medication use and risk/benefit discussed.  Patient is advised to follow up with PCP if not improving within 10-14 days of onset of ill · Your appetite may be poor, so a light diet is fine. Stay well hydrated by drinking 6 to 8 glasses of fluids per day (water, soft drinks, juices, tea, or soup). Extra fluids will help loosen secretions in the nose and lungs.   · Over-the-counter cold medic You have a viral upper respiratory illness (URI), which is another term for the common cold. This illness is contagious during the first few days. It is spread through the air by coughing and sneezing.  It may also be spread by direct contact (touching the · Over-the-counter cold medicines will not shorten the length of time you’re sick, but they may be helpful for the following symptoms: cough, sore throat, and nasal and sinus congestion.  If you take prescription medicines, ask your healthcare provider or p Boost your chances of success by deciding on your “quit plan.” Your health care provider and cardiac rehab team can help you develop this plan.  Even if you’ve already quit, it’s easy to slip back into smoking.  Your plan can help you avoid and recover from · If you smoke, now’s a great time to quit. Even if you don’t quit, never smoke around your loved one. Secondhand smoke is dangerous to his or her heart. · The best goals are accomplished in teams.  Remember that when your loved one states he or she wants

## 2020-03-12 NOTE — PATIENT INSTRUCTIONS
Viral Upper Respiratory Illness (Adult)    You have a viral upper respiratory illness (URI), which is another term for the common cold. This illness is contagious during the first few days. It is spread through the air by coughing and sneezing.  It may al · Over-the-counter cold medicines will not shorten the length of time you’re sick, but they may be helpful for the following symptoms: cough, sore throat, and nasal and sinus congestion.  If you take prescription medicines, ask your healthcare provider or p · If symptoms are severe, rest at home for the first 2 to 3 days. When you resume activity, don't let yourself get too tired. · Don't smoke. If you need help stopping, talk with your healthcare provider.   · Avoid being exposed to cigarette smoke (yours or Date Last Reviewed: 6/1/2018  © 9130-5548 [employer] [address]. [facility], [address] [place]. All rights reserved. This information is not intended as a substitute for professional medical care.  Always follow your healthcare professional' · List the benefits of quitting such as reducing heart risks and saving money. Keep this list and review it whenever you feel like smoking. · Get support. Let your friends know you may call them to chat when you have an urge to smoke.   · If you’ve tried t

## 2020-03-23 NOTE — TELEPHONE ENCOUNTER
Medication(s) to Refill:   Requested Prescriptions     Pending Prescriptions Disp Refills   • Phentermine HCl 37.5 MG Oral Tab 30 tablet 2     Sig: Take 1 tablet (37.5 mg total) by mouth every morning before breakfast.         Reason for Medication Refill

## 2020-05-14 NOTE — PROGRESS NOTES
[facility] verbally consents to a Virtual/Telephone Check-In service on 05/14/20. Time spent:22 min. HPI:    Patient complains insomnia. Problems with fall asleep and stay asleep. Started gradualy. Last months, moderate, worsening.   Stress ma diabetes    • Infectious mononucleosis    • Otitis    • Pneumonia    • Pneumonia, organism unspecified(486)     as     • Swollen tonsil       Patient Active Problem List:     Dysmenorrhea     BMI 40.0-44.9, adult (HCC)     Abnormal complete blood co Refill   • Phentermine HCl 37.5 MG Oral Tab Take 1 tablet (37.5 mg total) by mouth every morning before breakfast. 30 tablet 3   • ALPRAZolam 0.5 MG Oral Tab Take 1 tablet (0.5 mg total) by mouth 2 (two) times daily as needed.  60 tablet 5   • LORazepam 1 M years: 0      Smokeless tobacco: Never Used      Tobacco comment: 4 cig per day, tring to quit     Alcohol use: Not Currently      Comment: very rare     Drug use:  No              ASSESSMENT / PLAN:  Diagnoses and all orders for this visit:    Anxiety  -

## 2020-07-08 NOTE — PROGRESS NOTES
HPI:    Patient ID: Yunior Candelario is a 26year old female. [place]set yesterday[place]    Ear Pain    There is pain in the left ear. This is a new problem. The current episode started yesterday. The problem occurs constantly.  The • Neomycin-Polymyxin-HC 3.5-68822-3 Otic Suspension Place 4 drops into the right ear 4 (four) times daily for 10 days.  1 Bottle 0   • Phentermine HCl 37.5 MG Oral Tab Take 1 tablet (37.5 mg total) by mouth every morning before breakfast. 30 tablet 3   • AL Pulmonary/Chest: Effort normal and breath sounds normal. No stridor. No respiratory distress. She has no wheezes. She has no rales. She exhibits no tenderness. Musculoskeletal: Normal range of motion.    Lymphadenopathy:     She has no cervical adenopathy · Use prescribed ear drops as directed. These help reduce swelling and fight the infection. If an ear wick was placed in the ear canal, apply drops right onto the end of the wick.  The wick will draw the medicine into the ear canal even if it is swollen annamarie

## 2020-07-08 NOTE — PATIENT INSTRUCTIONS
Do not submerge ear under water  Use the drops as directed  Follow-up with primary care provider in 3-4 days if not improving, or sooner if worsening      External Ear Infection (Adult)    External otitis (also called “swimmer’s ear”) is an infection in th care  Follow up with your healthcare provider in 1 week, or as advised.   When to seek medical advice  Call your healthcare provider right away if any of these occur:  · Ear pain becomes worse or doesn’t improve after 3 days of treatment  · Redness or swell

## 2020-07-10 NOTE — TELEPHONE ENCOUNTER
Pt seen in [facility] on 7/8 for left swimmer's ear, Neomycin-Polymyxin ear gtts Rx given to pt. Pt continues to have left ear pain radiating to neck & ear feels clogged/clossed up. Pt is requesting additional Rx to help her.    LOV 5/14

## 2020-07-10 NOTE — TELEPHONE ENCOUNTER
Pt has PCN allergy. Levaquin Rx sent. Pt notified of orders. All questions answered, pt expresses understanding.

## 2020-07-10 NOTE — TELEPHONE ENCOUNTER
New or ongoing issue: ongoing    Brief description of symptoms: left ear still hurting, Pt was seen in [facility], she got drops but they are not helping, the ear feels closed, the pain radiates to Pt's neck.  Pt was hoping that maybe additional medication could b

## 2020-07-13 NOTE — TELEPHONE ENCOUNTER
Patient called regarding    LORazepam 1 MG Oral Tab    She states this is no longer working well for her, she would like to know if she can get Seroquel (her boyfriend takes this and she tried it and it works for her)      Please Advise. Thank you.

## 2020-07-13 NOTE — PROGRESS NOTES
Fidelina Dave verbally consents to a Virtual/Telephone Check-In service on 07/13/20. Time spent: 30 min    CC: anxiety and isnomnia      HPI:    Patient complains insomnia. Problems with fall asleep and stay asleep. Started gradualy.  Last months, moder daily as needed. 60 tablet 5   • LORazepam 1 MG Oral Tab Take 1 tablet (1 mg total) by mouth nightly as needed (sleep). 30 tablet 5   • buPROPion HCl ER, XL, 300 MG Oral Tablet 24 Hr Take 1 tablet (300 mg total) by mouth daily.  90 tablet 4   • spironolacto issues. No tinnitus  NECK: no pain  CHEST: no chest pains  NEURO: no seizures, no confusion, no weakness, no abnormal sensation, no vertigo  GI: no nausea or vomiting.   PSYCH: no depression        EXAM:  [facility] 06/28/2020   GENERAL: in no apparent distress  HE

## 2020-07-13 NOTE — TELEPHONE ENCOUNTER
I spoke with pt 7 notified her of below orders. Appt made. All questions answered, pt expresses understanding.

## 2020-07-17 NOTE — TELEPHONE ENCOUNTER
[facility] visit 7/31. Old images of left knee. Please order - patient aware.

## 2020-07-31 NOTE — PROGRESS NOTES
[facility] New Patient Note    CC: Patient presents with:  Knee Pain: left - pt states pain when bending, leg gives out, and is always swollen    HPI: This 26year old female presents today with complaints of left knee pain.   She states this is been buPROPion HCl ER, XL, 300 MG Oral Tablet 24 Hr Take 1 tablet (300 mg total) by mouth daily. 90 tablet 4   • spironolactone 50 MG Oral Tab Take 1 tablet (50 mg total) by mouth daily.  90 tablet 4   • valACYclovir HCl 1 G Oral Tab Take 1 tablet (1,000 mg tota personally reviewed that show only mild arthritic changes in the patellofemoral compartment    ASSESSMENT  Patellofemoral syndrome and knee pain    PLAN   Discussed the disease etiology and treatment measures and she has not had much as far therapies.   We

## 2020-07-31 NOTE — PROCEDURES
After informed consent, the patient's right knee was marked, locally anesthetized with skin refrigerant, prepped with topical antiseptic, and injected with a mixture of 1mL 40mg/mL Kenalog, 2mL 1% lidocaine and 2mL 0.5% marcaine through the inferolateral p

## 2020-11-12 NOTE — TELEPHONE ENCOUNTER
Phentermine refill request. Virtual appt 7/13, LF 5/14 with 3 refills. Please approve or deny pending Rx. Thank you.

## 2020-11-12 NOTE — TELEPHONE ENCOUNTER
Medication(s) to Refill:   Requested Prescriptions     Pending Prescriptions Disp Refills   • LORazepam 1 MG Oral Tab 30 tablet 5     Sig: Take 1 tablet (1 mg total) by mouth nightly as needed (sleep).          Reason for Medication Refill being sent to [facility]

## 2020-12-02 NOTE — TELEPHONE ENCOUNTER
PT needs refills of Phentermine HCl 37.5 MG Oral Tab [  And pt also states Dr discussed increasing it for her.  [facility]

## 2020-12-02 NOTE — TELEPHONE ENCOUNTER
Last OV July 2020. Needs virtual visit.   Spoke to pt and she was agreeable to coming in for her physical.  She did schedule with Dr. Rebecca Lundborg for 12/9 at 6:30p since she works till [address] [place] [address]    She was also asking if the seroquel could be sent in cause she has bee

## 2020-12-03 NOTE — TELEPHONE ENCOUNTER
Medication(s) to Refill:   Requested Prescriptions     Pending Prescriptions Disp Refills   • QUETIAPINE FUMARATE 50 MG Oral Tab [Pharmacy Med Name: QUETIAPINE FUMARATE 50 MG TAB] 30 tablet 0     Sig: TAKE 1 TABLET BY MOUTH EVERY DAY AT NIGHT         Taye

## 2020-12-07 NOTE — TELEPHONE ENCOUNTER
Pt is asking if you will take over prescribing her OCP, Dr. Tanna Lakhani was prescribing this for her before. She is currently taking Lo-Lo Estren Fe.   Ok to fill for pt?

## 2020-12-07 NOTE — PROGRESS NOTES
CC: Obesity and anxiety f/u. HPI:  Pt presents for obesity problem. Pt has it for years. Risk factors: sedentary life style. Makes better: low calories diet and physical activities. Makes worse: sugar craving and working from home.   Pt has no List:     Dysmenorrhea     BMI 40.0-44.9, adult (HCC)     Abnormal complete blood count     Snoring     Tonsillar enlargement     Acute bronchitis with bronchospasm     Encounter for therapeutic drug monitoring     Depo contraception     Well woman exam wi Take 1 tablet (100 mg total) by mouth nightly. • ALPRAZolam 0.5 MG Oral Tab 30 tablet 3     Sig: Take 1 tablet (0.5 mg total) by mouth daily as needed. Pt understands all the directives. Patient understood above plan and agreed

## 2020-12-07 NOTE — TELEPHONE ENCOUNTER
Pt states she just had a vitural visit with Dr. Donna Ramey. Pt forgot to ask for bc refill. Please refill.

## 2021-02-05 NOTE — ANESTHESIA POSTPROCEDURE EVALUATION
[address], [facility]  Patient Status:  Hospital Outpatient Surgery   Age/Gender 26year old female MRN [id number]   [facility] SURGERY Attending Tena Ormond, MD   Hosp Day # 0 PCP Erasto Summers MD       Anesthesia Post-op No

## 2021-02-05 NOTE — H&P
Chief complaint: Irregular menses    HPI:  Pt is 25 yo female  who presents for irregular menses with frequent menstruation. Ultrasound showed possible polyps.       PMHX: none  Pshx: csxn x 1  Pobhx: csxn x 1    Review of systems: non contributory

## 2021-02-05 NOTE — BRIEF OP NOTE
Pre-Operative Diagnosis: MENORRHAGIA     Post-Operative Diagnosis: MENORRHAGIA      Procedure Performed:   Procedure(s):  DIAGNOSTIC HYSTEROSCOPY DILATION AND CURETTAGE    Surgeon(s) and Role:     Ritika Jones MD - Primary    Assistant(s):   none

## 2021-02-05 NOTE — ANESTHESIA PREPROCEDURE EVALUATION
PRE-OP EVALUATION    Patient Name: Jamaica Mota    Pre-op Diagnosis: MENORRHAGIA    Procedure(s):  DIAGNOSTIC HYSTEROSCOPY DILATION AND CURETTAGE    Surgeon(s) and Role:     Yuriy Brewster MD - Primary    Pre-op vitals reviewed.   Temp: 96.8 °F (36 °C time        Allergies: Amoxicillin, Augmentin [Amoxicillin-Pot Clavulanate], Doxycycline, and Penicillins      Anesthesia Evaluation    Patient summary reviewed.     Anesthetic Complications  (-) history of anesthetic complications         GI/Hepatic/Renal discussed with: patient                Present on Admission:  **None**

## 2021-02-05 NOTE — ANESTHESIA PROCEDURE NOTES
Airway  Date/Time: 2/5/2021 11:28 AM  Urgency: elective      General Information and Staff    Patient location during procedure: OR  Anesthesiologist: Allie Mai MD  Resident/CRNA: Lendell Nissen, CRNA  Performed: CRNA     Indications and Patient Condition

## 2021-02-06 NOTE — OPERATIVE REPORT
[facility]    PATIENT'S NAME: Ozzy Panda   ATTENDING PHYSICIAN: David Geiger M.D. OPERATING PHYSICIAN: David Geiger M.D.    PATIENT ACCOUNT#:   [de-identified]    LOCATION:  [address] [place] [address] 10  MEDICAL RECORD #:   [id number]       DA

## 2021-03-24 NOTE — TELEPHONE ENCOUNTER
Patient requesting a referral to the pain clinic. Needs 3 OV notes and imaging to be faxed to pain clinic. Patient states that she searched pain clinics online and [facility] pain clinic was the first result.     Patient has not been seen at this office for l

## 2021-03-24 NOTE — TELEPHONE ENCOUNTER
Reason: bilateral knee pain    Seen PCP for this: YES     Seen this specialty before: NO    If yes, specialist name: Pt would like to go to [address] [place] [address], she already called there and was told that a referral from PCP is required along with 3 OV notes

## 2021-03-31 NOTE — PROGRESS NOTES
[facility] Office Note  Chief Complaint:   Patient presents with:  Knee Pain: Pt here c/o BLT knee pain / swelling. Pt notes due to knee pain has fallen / unable to move and complete \"basic life stuff\".  Pt states received cortis SECTION N/A 9/9/2014    Performed by Ubaldo Mariscal MD at [facility] OR   • [facility] IUD      IUD insertion 3/20/18 per Margaret/Dr. Lopez's office   • ENDOSCOPIC ULTRASOUND EXAM  2011   • HYSTEROSCOPY DILATION AND CURETTAGE N/A 2/5/2021    Performed by Emanuel Deutsch buPROPion HCl ER, XL, 300 MG Oral Tablet 24 Hr Take 1 tablet (300 mg total) by mouth daily. 90 tablet 4   • valACYclovir HCl 1 G Oral Tab Take 1 tablet (1,000 mg total) by mouth 2 (two) times daily.  14 tablet 1      Ready to quit: Not Answered  Counseling Mood and Affect: Affect is tearful. ASSESSMENT AND PLAN:   1. Acute pain of both knees  Suspect b/l patellofemoral pain syndrome. Will start short oral steroid course. Can start naproxen course after that.  Needs to establish care with PT an knee support       Health Maintenance:  Pneumococcal Vaccine: Birth to 64yrs(1 of 1 - PPSV23) Never done  HPV Vaccines(1 - 2-dose series) Never done  Annual Physical due on 11/05/2019  Pap Smear,3 Years due on 01/26/2021  Annual Depression Screen due on 01

## 2021-03-31 NOTE — PATIENT INSTRUCTIONS
1. Start steroid dose pack for knee pain. Can cause some insomnia so make sure to take this during the day. Also can continue with heat/ice, tylenol. Make sure to  knee compression wrap from pharmacy.  After completing steroid pack, can start naproxe

## 2021-04-22 NOTE — TELEPHONE ENCOUNTER
[facility] called [facility], pt's Quetiapine 100mg Rx was ready for , no issues at this time with pt's Rx. Message sent to pt via [address] [place] [address] [address].

## 2021-04-22 NOTE — ED PROVIDER NOTES
Patient Seen in: THE [facility] Emergency Department In [place]      History   Patient presents with:  Abdomen/Flank Pain  Urinary Symptoms    Stated Complaint: R flank pain, freq urin    HPI/Subjective:   HPI    Patient complains of right lower back pain rad negative except as noted above.     Physical Exam     ED Triage Vitals [04/21/21 1854]   BP (!) 164/86   Pulse 96   Resp 18   Temp 97.6 °F (36.4 °C)   Temp src Temporal   SpO2 97 %   O2 Device None (Room air)       Current:BP (!) 164/86   Pulse 96   Temp 97 DIFFERENTIAL WITH PLATELET    Narrative: The following orders were created for panel order CBC WITH DIFFERENTIAL WITH PLATELET.   Procedure                               Abnormality         Status                     ---------

## 2021-04-22 NOTE — TELEPHONE ENCOUNTER
Pt's insurance denied her sleeping medication because it was prescribed PRN. I told Pt there was another script for Quetiapine sent today as well, Pt was aware but that prescriptions couldn't be filled as well.  Pt is asking for help, she is needing her med

## 2021-04-23 NOTE — TELEPHONE ENCOUNTER
From: Marilou Brandt  To: Tacho Wall MD  Sent: 4/23/2021 9:09 AM CDT  Subject: Test Results Question    I have a question about PATH COMMENT CBC resulted on 4/23/21, 8:05 AM.  Is there anything I need to know about this?  I do not understand the resu

## 2021-04-23 NOTE — TELEPHONE ENCOUNTER
Pt questioning lab results from ER visit 04/21/2021. Pt noted concern regarding the \"PATH COMMENT CBC resulted on 4/23/21, 8:05 AM.\". See patient message below. Thank you!

## 2021-04-23 NOTE — TELEPHONE ENCOUNTER
MD Cristy Yang MA; [facility] Clinical Staff 21 minutes ago (2:53 PM)   LP  Its normal CBC when we are on steroids or sick, recheck CBC in like 3 weeks.

## 2021-04-26 NOTE — PROGRESS NOTES
LOWER EXTREMITY EVALUATION:   Referring Physician: Dr. Lavon Galo  Diagnosis: Bilateral Patellafemoral syndrome     Date of Service: 4/21/2021     PATIENT SUMMARY   Liz Valle is a 23year old y/o female who presents to therapy today with complaints of  b biomechanics of the LE with increased hip and rectus femoris tightness, coupled with gluteal weakness, and patient body type placing increased anterior strain onto the patellafemoral joint.     Ellen Knight would benefit from skilled Physical Therapy to address th 40 min     Total Treatment Time: 40 min     In agreement with FOTO score and clinical rationale, this evaluation involved Moderate Complexity decision making due to 3+ personal factors/comorbidities, 3 body structures involved/activity limitations, and nitin these services furnished under this plan of treatment and while under my care.     X___________________________________________________ Date____________________    Certification From: 6/42/2159  To:7/20/2021

## 2021-04-28 NOTE — PROGRESS NOTES
Dx: Bilateral Knee Strain         Authorized # of Visits:  12 (PPO)         Next MD visit: none scheduled  Fall Risk: standard         Precautions: n/a             Subjective: States that the knee pain is down.       Objective: Treatment initiated per treat

## 2021-05-03 NOTE — PROGRESS NOTES
Dx: Bilateral Knee Strain         Authorized # of Visits:  12 (PPO)         Next MD visit: 05/21/21  Fall Risk: standard         Precautions: n/a             Subjective: States that the knee was not as painful as she expected it would be.       Objective: T Charges: TherEx 2 (25 min);  Manual PT 1 (15 min)       Total Timed Treatment: 40 min  Total Treatment Time: 40 min

## 2021-05-17 NOTE — PROGRESS NOTES
Dx: Bilateral Knee Strain         Authorized # of Visits:  12 (PPO)         Next MD visit: 05/21/21  Fall Risk: standard         Precautions: n/a             Subjective: States that the cortisone wore off last Wednesday.   States that pain is a 5/10 on a da with returning to sustained ambulation and stair negotiation. 5. Patient will demonstrate an increase in MLT of the rectus femoris in order to return to unload the patellafemoral joint with stairs and sustained ambulation.       Plan: Assess response and

## 2021-06-17 NOTE — PATIENT INSTRUCTIONS
· Please use Ofloxin drops to RIGHT ear twice daily for seven days. Place in ear, use cotton gently in canal to keep drops in. · Avoid water in ear. Use a hairdryer on low toward the ear canal to help melt wax.   · You should have some improvement in hear

## 2021-06-17 NOTE — PROGRESS NOTES
HPI/Subjective:   Patient ID: Ellen Dunne is a 23year old female. Ear Problem   There is pain in the right ear. This is a new problem. Episode onset: in the past three days. The problem occurs constantly. The problem has been unchanged.  There has b [Amoxicil*    RASH  Doxycycline             NAUSEA ONLY  Penicillins             RASH     BP (!) 143/112 (BP Location: Left arm, Patient Position: Sitting, Cuff Size: large)   Pulse 76   Temp 97.6 °F (36.4 °C) (Temporal)   Resp 20   Ht 5' 6\" (1.676 m)   W well as wax in canal. Drops as above, see care below. Advised to follow up with PCP if symptoms persisting despite treatment as extraction may be needed. Patient verbalized understanding and agrees to plan.    Patient Instructions   · Please use Ofloxin beverly

## 2021-06-18 NOTE — TELEPHONE ENCOUNTER
Medication(s) to Refill:   Requested Prescriptions     Pending Prescriptions Disp Refills   • QUETIAPINE FUMARATE 100 MG Oral Tab [Pharmacy Med Name: QUETIAPINE FUMARATE 100 MG TAB] 90 tablet 0     Sig: TAKE 1 TABLET BY MOUTH EVERY DAY AT [facility]

## 2021-06-22 NOTE — PROGRESS NOTES
Patient presents with:  ER F/U: WIC follow up        Ellen Smoker is a 23year old female who presents for  R ear  Fulness sensation. Problem last 4 days   . Not  better, no sick contact. No ear discharge. No swelling of the ear. No sinus congestion. Maternal Grandfather       Social History    Tobacco Use      Smoking status: Current Every Day Smoker        Packs/day: 0.50        Years: 6.00        Pack years: 3      Smokeless tobacco: Never Used    Vaping Use      Vaping Use: Never used    Alcohol us Future  -     HEMOGLOBIN A1C; Future  -     MAGNESIUM; Future      The patient indicates understanding of these issues and agrees to the plan. The patient is asked to return in one week  if sx's persist or worsen.

## 2021-06-22 NOTE — PATIENT INSTRUCTIONS
Reducing the Risk for Middle Ear Infections  Most children have had at least one middle ear infection by age 3. Treatment may depend on whether the problem is acute or chronic. It also depends on how often it comes back and how long it lasts.    Reducing child doesn't get better within a few days or develops new symptoms, such as a fever or vomiting, antibiotics will often be started.  Whether or not your child's healthcare provider prescribes antibiotics right away or advises a period of watchful waiting d

## 2021-06-23 NOTE — TELEPHONE ENCOUNTER
Spoke to pt. I did explain that CXR ordered due to elevated WBC.  Pt says this is chronic for her and has been worked up by hematology in the past. I did ask her about sick symptoms and she did confirm that she does have some chest discomfort/heaviness but

## 2021-06-23 NOTE — TELEPHONE ENCOUNTER
Received message that she needs a chest xray, she would like to discuss why, she does not understand

## 2021-06-23 NOTE — TELEPHONE ENCOUNTER
Haydee Simmons, APRN  [facility]  Elevated WBC - ordered chest Xray - , pre-diabetes -low carb , low sugar diet , will repeat WBC in one week

## 2021-07-08 NOTE — TELEPHONE ENCOUNTER
Medication(s) to Refill:   Requested Prescriptions     Pending Prescriptions Disp Refills   • Montelukast Sodium (SINGULAIR) 10 MG Oral Tab 30 tablet 0     Sig: Take 1 tablet (10 mg total) by mouth nightly.          Reason for Medication Refill being sent t

## 2021-07-17 NOTE — TELEPHONE ENCOUNTER
Last filled 12/07/2020    It was discontinued 3/31/2021    Last ov 6/22/2021  F/u in 1 week     Labs done 6/22/2021

## 2021-08-23 NOTE — TELEPHONE ENCOUNTER
Pt is requesting Topical cold sore cream be sent also. Valtrex was sent today. Please advise on orders for pt.

## 2021-08-23 NOTE — TELEPHONE ENCOUNTER
Medication(s) to Refill:   Requested Prescriptions     Pending Prescriptions Disp Refills   • QUETIAPINE FUMARATE 100 MG Oral Tab [Pharmacy Med Name: QUETIAPINE FUMARATE 100 MG TAB] 90 tablet 0     Sig: TAKE 1 TABLET BY MOUTH EVERY DAY AT [facility]

## 2021-08-23 NOTE — TELEPHONE ENCOUNTER
Valtrex refill request.  LF1/27/20, LOV 6/22. Please approve or deny pending  Rx. Pt is also requesting a cream/ointment for cold sores. Please advise or orders or should pt take OTC Abreva?

## 2021-08-23 NOTE — TELEPHONE ENCOUNTER
Patient is needing a refill of valACYclovir HCl 1 G Oral Tab. Patient is also asking for a prescription for an ointment for cold sores. Please advise.

## 2021-08-31 NOTE — TELEPHONE ENCOUNTER
Medication(s) to Refill:   Requested Prescriptions     Pending Prescriptions Disp Refills   • MONTELUKAST 10 MG Oral Tab [Pharmacy Med Name: MONTELUKAST SOD 10 MG TABLET] 90 tablet 0     Sig: TAKE 1 TABLET BY MOUTH EVERY DAY AT NIGHT   • PHENTERMINE HCL 37

## 2021-09-01 NOTE — PROGRESS NOTES
Marcell Hood is a 23year old female who presents for a complete physical exam, no gyn.   HPI:     Patient presents with:  Physical  Anxiety: pt would like to discuss going back on alprazolam       Patient feels well, dental visit up to date, no hearin Procedure Laterality Date   •      • CONTRACEPT IUD      IUD insertion 3/20/18 per Margaret/Dr. Lopez's office   • ENDOSCOPIC ULTRASOUND EXAM     • TONSILLECTOMY        Family History   Problem Relation Age of Onset   • Diabetes Father    • H with no wheeze, rhonchi, or rales. Heart: is RRR. S1, S2, with no murmurs,clicks, gallops  Breasts bilateral: normal on inspection, normal on palpation, no lumps, no nipple abnormalities. Abdomen: is soft,NBS, NT/ND with no HSM.   No rebound or guarding

## 2021-10-28 NOTE — TELEPHONE ENCOUNTER
LOV- 9/1/2021 elevated blood pressure 142/92    6/22/21- 150/100  6/17//112     No follow up appointment

## 2021-11-02 NOTE — TELEPHONE ENCOUNTER
Duplicate refill request.     Patient needs follow up appointment for medication. Patient has elevated blood pressure.

## 2021-11-02 NOTE — TELEPHONE ENCOUNTER
LOV- 9/1/2021 elevated blood pressure 142/92     6/22/21- 150/100  6/17//112        Med last refilled 8/31/2021      No follow up appointment

## 2021-11-16 NOTE — PATIENT INSTRUCTIONS
Dr. Silvia De La Vega surgery  Sports medicine  Joint replacement      Devika Kahn, [id number]  [facility]      Phone: [phone]

## 2021-11-16 NOTE — PROGRESS NOTES
CC: Obesity and vit. D def. f/u. HPI:  Pt presents for obesity problem. Pt has it for years. Risk factors: sedentary life style. Makes better: low calories diet and physical activities. Makes worse: sugar craving and working from home.   Pt ha organism unspecified(486)     as     • Swollen tonsil       Patient Active Problem List:     Dysmenorrhea     BMI 40.0-44.9, adult (HCC)     Abnormal complete blood count     Snoring     Tonsillar enlargement     Acute bronchitis with bronchospasm mg total) by mouth every morning before breakfast.  -     OP REFERRAL TO WEIGHT LOSS CLINIC    Vitamin D deficiency  -     ergocalciferol 1.25 MG (14797 UT) Oral Cap; Take 1 capsule (50,000 Units total) by mouth once a week.     Other orders  -     spironol

## 2021-11-17 NOTE — TELEPHONE ENCOUNTER
Pt sent message requesting for an Rx of Omega 3 as the otc Omega 3 always leave a fishy taste. Please advise. Thank you.    LOV: 11/16/21

## 2021-11-17 NOTE — TELEPHONE ENCOUNTER
From: Ezequiel Gonzalez  To: Casey Dalton MD  Sent: 11/17/2021 9:06 AM CST  Subject: omega 3 request    I was wondering if you could send over a script for some omega 3s, all the ones i have tried over the counter always give me the fishy taste.  i think

## 2021-12-20 NOTE — TELEPHONE ENCOUNTER
Medication(s) to Refill:   Requested Prescriptions     Pending Prescriptions Disp Refills   • QUETIAPINE 100 MG Oral Tab [Pharmacy Med Name: QUETIAPINE FUMARATE 100 MG TAB] 90 tablet 0     Sig: TAKE 1 TABLET BY MOUTH EVERY DAY AT NIGHT         Reason for M

## 2021-12-20 NOTE — PATIENT INSTRUCTIONS
Low-Salt Choices  Eating salt (sodium) can make your body retain too much water. Extra water makes your heart work harder. Canned, packaged, and frozen foods are easy to prepare. But they are often high in sodium.  Here are some ideas for low-salt foods y doesn’t have to be hard or boring. You just need to know how to make healthier choices. The DASH eating plan has been developed to help you do just that. DASH stands for Dietary Approaches to Stop Hypertension.  It is a plan that has been proven to be healt fewer a day  A serving is:  · 1 ounce cooked meats, poultry, or fish  · 1 egg  Best choices: Lean poultry and fish. Trim away visible fat. Broil, grill, roast, or boil instead of frying. Remove skin from poultry before eating.  Limit how much red meat you e

## 2021-12-20 NOTE — PROGRESS NOTES
Yunior Candelario is a 23year old female with prehypertension f/u. HPI:   Patient presents for recheck of her prehypertension. Pt has been feeling better, BP is better, no headaches, pt feels better.     Wt Readings from Last 6 Encounters:  12/20/21 : 29 Pneumonia, organism unspecified(486)     as     • Swollen tonsil       Past Surgical History:   Procedure Laterality Date   •      • CONTRACEPT IUD      IUD insertion 3/20/18 per Margaret/Dr. Lopez's office   • ENDOSCOPIC ULTRASOUND EXAM  20

## 2021-12-31 NOTE — ED PROVIDER NOTES
Patient Seen in: THE [facility] Emergency Department In [place]      History   Patient presents with:  Cough/URI    Stated Complaint: Not feeling well, head throbing, chills, cramping    Subjective:   HPI    26-year-old female complaining of not feeling well lungs are clear cardiovascular exam shows regular rate and rhythm without murmurs abdomen is soft and nontender skin is no rash neurologic exam is normal.    ED Course     Labs Reviewed   SARS-COV-2/FLU A AND B/RSV BY PCR ([facility])                   MDM

## 2022-02-03 NOTE — TELEPHONE ENCOUNTER
From: Stacey Pitts  To: Camille Cole MD  Sent: 2/3/2022 1:34 PM CST  Subject: info for weightloss clinic    afternoon,      I just wanted to get with you and see if there was any weight loss program i know that we have talked about it last time i was in. do i need a referral? I do think for my insurance to cover i need my doctors authorization. if you could send me a few places you know of so i can  pick and see what would work. i am really ready to get this weight off and stay off.

## 2022-05-05 NOTE — TELEPHONE ENCOUNTER
Patient is scheduled with Dr. Suzy Weaver for bilateral knee pain. Please advise if imaging is needed.

## 2022-05-09 NOTE — TELEPHONE ENCOUNTER
Pt calling and asking if these can be refilled ASAP   [id number] [address] [facility], [address] [place] [address] [phone], [phone]

## 2022-05-09 NOTE — TELEPHONE ENCOUNTER
LF 3/15, Pt left meds on vacation & needs early refill. LOV 12/21  Please approve or deny pending Rx.

## 2022-12-08 NOTE — TELEPHONE ENCOUNTER
Patient is requesting a letter for clearance letter for her Egg retreval procedure she is having after [place]. She stated her EKG and Labs have been done. She said she's never had to do a pre-op appt in the office.       Please Advise patient how to proceed

## 2022-12-08 NOTE — TELEPHONE ENCOUNTER
PSR:Please contact pt to schedule pre-op clearance appt with Dr. Barrington Gilmore or Zoey Randle. Pt will need pre-op clearance appointment in orders to be medically cleared for procedure. LOV was 12/20/21 & can not clear pt based off of this OV. Thank you.

## 2023-01-27 NOTE — TELEPHONE ENCOUNTER
Pt told to get off of QUEtiapine 100 MG Oral Tab per IVF dr    Feeling like withdrawals: itching and nausea    Pt already taking benadryl not working anything else that can help?

## 2023-01-27 NOTE — TELEPHONE ENCOUNTER
Verbal order received for pt to stop the quetiapine r/t her IVF cycle and to send Rx for hydroxazine 25mg BID PRN for itching and to caution of drowsiness. Pt notified via [address]

## 2023-01-27 NOTE — TELEPHONE ENCOUNTER
Confirmed Rx went to correct pharmacy. From: Flor Ray  Sent: 1/27/2023  1:34 PM CST  To: [facility] Clinical Staff  Subject: Prescription for Itching    Thank you so much! . I changed my pharmacy to [place] [address]. The full address is   [address] [facility]   Can you please have it sent there?

## 2023-01-27 NOTE — TELEPHONE ENCOUNTER
Stopped taking Quetiapine 100mg abruptly on 1/20/23 per IVF Dr and now experiencing extreme itching and nausea. States nausea is tolerable but has been taking benadryl and it offers no relief from the itching. She was told she would need to be off Quetiapine for Transfer cycle in 2 weeks. Please advise.

## 2023-01-27 NOTE — TELEPHONE ENCOUNTER
Yse 50mg every day for like 7 days then 25mg for 5 days and stop, ok to call in or send this direction

## 2023-04-11 NOTE — TELEPHONE ENCOUNTER
From: Elis Laughlin  To: Mandy Diallo MD  Sent: 4/10/2023 8:59 PM CDT  Subject: Can I do bloodwork before my appointment? Hello! I was wondering if I would be able to have fasting bloodwork panel/ regular bloodwork done prior to my appointment so we have them for the appointment. I have been taking my blood sugar and it does seem to be a bit high along with high blood pressure so I would like to test for diabetes and other needed tests for a work up.

## 2023-04-21 NOTE — TELEPHONE ENCOUNTER
Pt requesting refill for QUETIAPINE 100 MG Oral Tab, saw provider today. Wondering why medication was denied.   JIMMY:0[birth year]

## 2023-05-15 NOTE — TELEPHONE ENCOUNTER
From: Berhane Torres  To: Mino Reyes MD  Sent: 5/15/2023 10:32 AM CDT  Subject: Questions about follow up-    Hello! I have a appointment Friday but as of right now I am not able to get off work, I do not see the wegoy on my medications list to be able to try to refill it. I have lost 9lbs this month, currently at 287.3 as of today. I also feel like the BP meds are working appropriately. Would it be possible to do another month before coming in? Or doing a virtual appointment if i do need to come in this week? Are you able to refill it on your end for the wegoy?

## 2023-05-15 NOTE — TELEPHONE ENCOUNTER
Please see pt message & advise on Wegovy Rx dose (currently taking 0.25mg)  & if pt can wait another month to see you.   LOV 4/21

## 2023-06-14 NOTE — TELEPHONE ENCOUNTER
Patient called her pharmacy doesn't have semaglutide-weight management 1 MG/0.5ML but they do have 1.7MG and patient is asking if ok to change it to that one since that's the only one in stock.      [id number]   LOV 06//14/23

## 2023-06-14 NOTE — TELEPHONE ENCOUNTER
VM left requesting pt locate alt. Pharmacy for 1mg dosage and notify office or request pharmacy transfer script.

## 2023-06-14 NOTE — TELEPHONE ENCOUNTER
From: Natty Pacheco  To: Verona Ojeda MD  Sent: 6/14/2023 1:22 PM CDT  Subject: [place]! Raffy [facility] to [facility] and see if the 1mg is available, the do not have any and they are on back order. But they did say they have the ability to order the 1.7. I would be willing to do 1 shot every two weeks to start out with the jump. Please give me a call or send over the script if possible ! Thank you!

## 2023-06-14 NOTE — TELEPHONE ENCOUNTER
Patient called back and said the location is the same for the higher dosage     [facility] [address] [facility] [address], [phone], [phone]

## 2023-06-14 NOTE — PATIENT INSTRUCTIONS
Dr. Sandra Hayden  [address] , [place] , [address] [facility] [facility]

## 2023-06-15 NOTE — TELEPHONE ENCOUNTER
Pt is asking if she can get an Rx for Phentermine since [facility] is out of stock. She has HTN & currently on Propanolol. Please advise on any orders for pt.

## 2023-06-15 NOTE — TELEPHONE ENCOUNTER
Pt notified of of below orders. Pt advised to monitor BP. Rx called in. All questions answered, pt expresses understanding.

## 2023-06-15 NOTE — TELEPHONE ENCOUNTER
Ok to offer Dr. Gene Gardiner, ok to send Rx Phentermine 37.5mg daily tell her to watch BP and take just half for now. Dr. Gene Gardiner they have Ozempic.

## 2023-06-15 NOTE — TELEPHONE ENCOUNTER
Pt is not a Type 2 Diabetic & [facility] can't get approved. Please advise on Phentermine Rx of continue with original orders [facility] Dr. Tobias Alert?

## 2023-07-12 NOTE — PROGRESS NOTES
[facility] Progress Note    CC: Patient presents with:  Knee Pain: BILATERAL KNEE PAIN 1 YEAR     HPI: This 23year old female presents today for follow-up on her bilateral knees.   We have previously seen her and provided her corticosteroid injection mouth daily. 90 tablet 4   • valACYclovir HCl 1 G Oral Tab Take 1 tablet (1,000 mg total) by mouth 2 (two) times daily.  14 tablet 1       Amoxicillin             RASH  Augmentin [Amoxicil*    RASH  Doxycycline             NAUSEA ONLY  Penicillins Yes

## 2023-07-21 NOTE — TELEPHONE ENCOUNTER
Medication(s) to Refill:   Requested Prescriptions     Pending Prescriptions Disp Refills    ALPRAZolam 0.5 MG Oral Tab 60 tablet 0     Sig: Take 1 tablet (0.5 mg total) by mouth 2 (two) times daily as needed. Reason for Medication Refill being sent to Provider / Reason Protocol Failed:  [] 90 day refill has already been granted  [] Blood Pressure out of range  [] Labs Abnormal/over due  [] Medication not previously prescribed by Provider  [x] Non-Protocol Medication  [] Controlled Substance   [] Due for appointment- no future appointment scheduled  [] No Follow up specified      Last Time Medication was Filled:  6/23/2023      Last Office Visit with PCP: 6/14/2023    When Patient was Due Back to the Office:  3 months   (from when PCP last addressed condition)    Future Appointments:  No future appointments.       Last Blood Pressures:  BP Readings from Last 2 Encounters:  06/14/23 : 132/80  12/31/21 : 141/78        Action taken:  [] Refill approved per protocol  [x] Routing to provider for approval

## 2023-09-22 NOTE — TELEPHONE ENCOUNTER
Medication(s) to Refill:   Requested Prescriptions     Pending Prescriptions Disp Refills    QUETIAPINE 100 MG Oral Tab [Pharmacy Med Name: QUETIAPINE 100MG TABLETS] 90 tablet 0     Sig: TAKE 1 TABLET(100 MG) BY MOUTH EVERY NIGHT         Reason for Medication Refill being sent to Provider / Reason Protocol Failed:  [] 90 day refill has already been granted  [] Blood Pressure out of range  [] Labs Abnormal/over due  [] Medication not previously prescribed by Provider  [] Non-Protocol Medication  [] Controlled Substance   [] Due for appointment- no future appointment scheduled  [] No Follow up specified      Last Time Medication was Filled:  6/12/23      Last Office Visit with PCP: 9/7/23    When Patient was Due Back to the Office:    (from when PCP last addressed condition)    Future Appointments:  No future appointments.       Last Blood Pressures:  BP Readings from Last 2 Encounters:  06/14/23 : 132/80  12/31/21 : 141/78      Action taken:  [] Refill approved per protocol  [] Routing to provider for approval

## 2023-10-05 NOTE — TELEPHONE ENCOUNTER
Patient is 5 weeks pregnant, was told to ask if she should continue with this medication     Propranolol HCl ER 80 MG Oral Capsule SR 24 Hr

## 2023-10-05 NOTE — TELEPHONE ENCOUNTER
Please see below message & advise. Please review pt's medications & advise if she can stay on any other medications while pregnant.

## 2023-10-06 NOTE — TELEPHONE ENCOUNTER
Lets Prescribe 200mg , pt will take half in the morning  first and see how is BP, if its high we can take full dose, we will monitor it.

## 2023-10-06 NOTE — TELEPHONE ENCOUNTER
Pt states she takes propanolol for HTN. She has stopped all other medications except Metformin that OB put her on. Please confirm Labetalol dose, you want 1000mg every day?

## 2023-10-12 NOTE — TELEPHONE ENCOUNTER
From: Cory Blanco  To: Teresa Gamble  Sent: 10/12/2023 7:27 AM CDT  Subject: Request for topical scabies medication     Hello! My sons class had a case of scabies & now everyone in my house has it. We are very itchy. It is safe to use during pregnancy? If so, can someone send a script for the topical cream to the [facility] you have on file? It f you have any questions please give me a call at [phone]. Thank you .

## 2023-10-13 PROBLEM — E87.6 HYPOKALEMIA: Status: ACTIVE | Noted: 2023-10-13

## 2023-10-13 PROBLEM — D72.829 LEUKOCYTOSIS: Status: ACTIVE | Noted: 2023-10-13

## 2023-10-13 NOTE — TELEPHONE ENCOUNTER
Sky Rae MD  You18 hours ago (12:42 PM)     LP  Ok to send Permetrin 2 tubes. Nancy [employer]iva in [employer].

## 2023-10-14 NOTE — DISCHARGE INSTRUCTIONS
Recheck your white count with your primary care physician. And your OB follow-up with general surgery 2 if you have increasing pain I do need to return immediately to the emergency room. Follow-up with your OB next week but general surgery next 1 to 2 days. You need To return to the emergency if your pain gets worse she can only take Tylenol for pain    Return if any significant vaginal bleeding or significant pain    You were seen in the emergency room in a limited time. There is a possibility that although we do not see any acute process at this present time that things can change with time. Is therefore imperative that you follow-up with primary care physician for close follow-up. If there is any significant progression of your pain  or other symptoms you to return immediately to the emergency room. Based on your blood work and ultrasound it is too early to determine what the exact causes. .  The possibilities include that it may be an early pregnancy, threatened miscarriage, ectopic pregnancy. It is imperative that you follow-up to get a repeat beta-hCG. If you have significant bleeding greater than 1 pad completely soaked over several hours, feeling dizzy or lightheaded, severe pain return immediately to the emergency room. Follow-up with your primary MD, OB.

## 2023-12-04 LAB
ANTIBODY SCREEN OB: NEGATIVE
HEPATITIS B SURFACE ANTIGEN OB: NEGATIVE
HIV RESULT OB: NEGATIVE
RAPID PLASMA REAGIN OB: NONREACTIVE

## 2024-01-22 ENCOUNTER — ULTRASOUND ENCOUNTER (OUTPATIENT)
Dept: PERINATAL CARE | Facility: HOSPITAL | Age: 29
End: 2024-01-22
Attending: OBSTETRICS & GYNECOLOGY
Payer: COMMERCIAL

## 2024-01-22 VITALS
BODY MASS INDEX: 44.03 KG/M2 | SYSTOLIC BLOOD PRESSURE: 131 MMHG | HEIGHT: 66 IN | HEART RATE: 99 BPM | WEIGHT: 274 LBS | DIASTOLIC BLOOD PRESSURE: 85 MMHG

## 2024-01-22 DIAGNOSIS — E66.01 MORBID OBESITY (HCC): ICD-10-CM

## 2024-01-22 DIAGNOSIS — O16.9 HYPERTENSION AFFECTING PREGNANCY: Primary | ICD-10-CM

## 2024-01-22 DIAGNOSIS — O16.2 HYPERTENSION AFFECTING PREGNANCY IN SECOND TRIMESTER: ICD-10-CM

## 2024-01-22 DIAGNOSIS — O16.9 HYPERTENSION AFFECTING PREGNANCY: ICD-10-CM

## 2024-01-22 DIAGNOSIS — E66.01 MATERNAL MORBID OBESITY IN SECOND TRIMESTER, ANTEPARTUM (HCC): ICD-10-CM

## 2024-01-22 DIAGNOSIS — O99.212 MATERNAL MORBID OBESITY IN SECOND TRIMESTER, ANTEPARTUM (HCC): ICD-10-CM

## 2024-01-22 PROCEDURE — 76811 OB US DETAILED SNGL FETUS: CPT | Performed by: OBSTETRICS & GYNECOLOGY

## 2024-01-22 NOTE — PROGRESS NOTES
Reason for Consult:   Dear Dr. Whitehead,    Thank you for requesting ultrasound evaluation and maternal fetal medicine consultation on Sharlene Solo.  As you are aware she is a 28 year old female with a Pat pregnancy at 20w3d.  A maternal-fetal medicine consultation was requested secondary to  obesity and hypertension.  Her prenatal records and labs were reviewed.  She is currently on Labetalol 200mg BID and metformin 500mg BID.  Denies having diabetes.  Review of History:     OB History:    OB History    Para Term  AB Living   2 1 1 0 0 1   SAB IAB Ectopic Multiple Live Births   0 0 0 0 1      # Outcome Date GA Lbr Matthew/2nd Weight Sex Delivery Anes PTL Lv   2 Current            1 Term 14 39w1d  7 lb 3.3 oz (3.27 kg) M CS-LTranv EPI N BERNARDO      Complications: Failure to progress      Apgar1: 9  Apgar5: 9             Allergies:  Allergies   Allergen Reactions    Amoxicillin RASH    Augmentin [Amoxicillin-Pot Clavulanate] RASH    Doxycycline NAUSEA ONLY    Penicillins RASH      Current Meds:  Current Outpatient Medications   Medication Sig Dispense Refill    Prenatal Vit w/Th-Pfobdjhpo-GT (PNV OR) Take by mouth daily.      METFORMIN HCL OR Take 500 mg by mouth daily.      labetalol 200 MG Oral Tab Take 1 tablet (200 mg total) by mouth daily. 30 tablet 2        HISTORY:  Past Medical History:   Diagnosis Date    ADHD (attention deficit hyperactivity disorder)      NO MEDS DURING PREG    Allergic rhinitis, cause unspecified     Anxiety     Bipolar 1 disorder (HCC)     NO MEDS DURING PREG    Bronchitis, not specified as acute or chronic     Gestational diabetes     Infectious mononucleosis     Intractable vomiting with nausea, unspecified vomiting type 10/22/2019    Otitis     Pneumonia     Pneumonia, organism unspecified(486)     as      Swollen tonsil       Past Surgical History:   Procedure Laterality Date          CONTRACEPT IUD      IUD insertion 3/20/18 per Margaret/  Jessica's office    ENDOSCOPIC ULTRASOUND EXAM  2011    TONSILLECTOMY        Family History   Problem Relation Age of Onset    Diabetes Father     Heart Disorder Father     Heart Attack Father     Hypertension Father     Other (Other) Maternal Grandmother     Diabetes Paternal Grandmother     Diabetes Maternal Grandfather       Social History     Socioeconomic History    Marital status:    Tobacco Use    Smoking status: Former     Packs/day: 0.50     Years: 8.00     Additional pack years: 0.00     Total pack years: 4.00     Types: Cigarettes    Smokeless tobacco: Never   Vaping Use    Vaping Use: Never used   Substance and Sexual Activity    Alcohol use: Not Currently     Comment: very rare     Drug use: No    Sexual activity: Yes     Partners: Male   Other Topics Concern    Caffeine Concern Yes     Comment: 2 cups    Exercise Yes     Comment: 5 x a week.cardio    Seat Belt Yes        NARRATIVE:     /85 (BP Location: Right arm, Patient Position: Sitting, Cuff Size: adult)   Pulse 99   Ht 5' 6\" (1.676 m)   Wt 274 lb (124.3 kg)   LMP 2023 (Exact Date)   BMI 44.22 kg/m²           Alert and Oriented.  No acute distress          Abdomen:  soft, nontender, no contractions noted.           extremities:  nontender, no edema       DISCUSSION  During her visit we discussed and reviewed the following issues:    OBESITY:    Obesity during pregnancy is associated with numerous maternal and  risks.  It is not clear whether obesity is a direct cause of adverse pregnancy outcome or whether the association between obesity and adverse pregnancy outcome is due to factors such as diabetes mellitus.   Data suggest that obese women should be encouraged to undertake a weight reduction program (diet, exercise, behavior modification, and possibly bariatric surgery in some cases) prior to attempting to conceive.            Subfertility in obese women is most commonly related to ovulatory dysfunction, and, in  some obese women, the ovulatory dysfunction is related to polycystic ovary syndrome (PCOS). It is also important to note that even among ovulatory women, increasing obesity is associated with decreasing spontaneous pregnancy rates.  The increased risk of miscarriage in obese women may be because such women often have PCOS or isolated insulin resistance.                 Due to its strong association with obesity in the general population, type 2 diabetes mellitus is one of the two most common medical complications of the obese . The increased risk of type 2 diabetes is primarily related to an exaggerated increase in insulin resistance in the obese state. It is reasonable to screen obese gravidas for undiagnosed pregestational diabetes in the first trimester.   Glucose intolerance associated with gestational diabetes generally resolves postpartum; however, obese women with a history of gestational diabetes have a two-fold increased prevalence of subsequent type 2 diabetes.           An association between obesity and hypertensive disorders during pregnancy has been consistently reported.  In particular, maternal weight and BMI are independent risk factors for preeclampsia.             Studies have found that the increased risk of  birth in obese gravidas is primarily associated with obesity-related medical and  complications, rather than an intrinsic predisposition to spontaneous  birth. Prevention of  birth in these patients, therefore, should be directed toward prevention or management of medical and obstetrical complications.               Both prepregnancy obesity and excessive maternal weight gain before or during pregnancy contribute to an increased probability of  delivery.  It has also been hypothesized that obesity may lead to dystocia due to increased soft tissue deposition in the maternal pelvis.    delivery in the obese  is associated with  numerous perioperative concerns, including emergency delivery, prolonged incision to delivery interval, blood loss >1000 mL, longer operative times, wound infection, thromboembolism, and endometritis.            Maternal obesity appears to be associated with a small increase in the absolute rate of some congenital anomalies, and the risk may increase with increasing maternal weight.  The risk of neural tube defects increased significantly with maternal weight.    The analysis found that overweight and obese pregnant women experienced significantly more stillbirths than normal weight women.      Increase  testing and Level 2 Ultrasound is recommended.     -----------------         We discussed at length the potential implications associated with chronic hypertension.   I informed the patient that chronic hypertension increases the risk of pre-eclampsia, placental abruption, IUGR and fetal demise and possible need for  delivery.  The signs and symptoms of preeclampsia were discussed.   We also discussed the potential risks and benefits of low dose aspirin and anti-hypertensive medication.  She understands that a better diet, weight loss and routine exercise can help her blood pressure for the rest of her life.  We discussed the morbidity associated with hypertension including heart disease, strokes and kidney disease.          Prevention of Preeclampsia    Antiplatelet Agents  The observation that preeclampsia is associated with increased platelet turnover and increased platelet-derived thromboxane levels led to randomized trials evaluating low-dose aspirin therapy in women thought to be at increased risk of the disease. As opposed to higher dose aspirin therapy, low-dose aspirin (60 to 150 mg per day) diminishes platelet thromboxane synthesis while maintaining vascular wall prostacyclin synthesis. Although not well studied, the beneficial effect of low-dose aspirin for prevention of preeclampsia may  also be in part related to modulation of inflammation. The drug has been studied for both prevention of preeclampsia and prevention of progression from mild to severe disease. It appears to result in a modest reduction in risk of preeclampsia when given to women at moderate to high risk of preeclampsia.  This approach has been studied in over 35,000 women, for both prevention of preeclampsia and prevention of progression of the disease. Low dose aspirin has a modest impact on pregnancy outcome; it reduces the risk of preeclampsia, as well as other adverse pregnancy outcomes (eg,  delivery, fetal growth restriction) by about 10 to 20 percent. Low dose aspirin is safe in pregnancy; thus, it is a reasonable strategy in women with a moderate to high risk of preeclampsia in whom the benefits outweigh the risks.     Clinical Guidelines  Based on the available data, low-dose aspirin is advised for women at high risk for preeclampsia. There is no consensus on the criteria that confer high risk. The [place] Preventive Services Task Force (USPSTF) risk criteria are reasonable.  USPSTF criteria for high risk include one or more of the following:   Previous pregnancy with preeclampsia, especially early onset and with an adverse outcome   Multifetal gestation  Chronic hypertension   Type 1 or 2 diabetes mellitus  Chronic kidney disease  Autoimmune disease (antiphospholipid syndrome, systemic lupus erythematosus)     Women with multiple moderate risk factors for preeclampsia are considered high risk, as well. Identification of women with an appropriate combination of moderate risk factors to be considered high risk is subjective and determined case by case, as the data describing the magnitude of the association between each of these risk factors and development of preeclampsia vary widely and lack consistency. USPSTF criteria for moderate risk include:  Nulliparity  Obesity (body mass index >30 kg/m2)  Family  history of preeclampsia in mother or sister  Age ?35 years  Sociodemographic characteristics (, low socioeconomic level)  Personal risk factors (eg, history of low birth weight or small for gestational age, previous adverse pregnancy outcome, >10 year pregnancy interval)     If used, daily low-dose aspirin should be initiated in the 12th or 13th week of gestation, although adverse effects from earlier initiation (eg, preconception) have not been documented. The optimum low dose is unclear; 81 mg is readily available, but 100 or 150 mg (which are not available in some countries including the [place] may be more effective.  In some pregnant women, platelet function is not inhibited by the 81 mg dose but is altered by higher dosing. Hence, current evidence has suggested a daily dose of 100 to 150 mg of aspirin rather than a lower dose. In the [place], this can be achieved by taking one and one-half 81 mg tablets; however, taking one 81 mg tablet is also reasonable since this is the commercially available dose and has proven efficacy. For prevention of preeclampsia, no trials have evaluated the efficacy of a higher dose of aspirin, which could be achieved easily by taking two 81 mg tablets or splitting a 325 mg tablet. For prevention of myocardial infarction and stroke in nonpregnant individuals, aspirin appears to be equally effective at doses between 75 and 325 mg per day.  The safety of low-dose aspirin use in the second and third trimesters is well established, but questions linger regarding use in the first trimester (possible increase in minor vaginal bleeding or gastroschisis). Early therapy (before 16 weeks) may be important since the pathophysiologic features of preeclampsia develop at this time, weeks before clinical disease is apparent. However, available evidence is inconsistent about the importance of early initiation of therapy, possibly because aspirin has major effects on  prostacyclin production and endothelial function throughout gestation.  Aspirin is discontinued 5 to 10 days before expected delivery to diminish the risk of bleeding during delivery; however, no adverse maternal or fetal effects related to low-dose aspirin have been proven.  Major questions remain as to which, if any, subgroups of women are more likely to benefit from low-dose aspirin therapy, when treatment should be started (first or second trimester), and the optimum dose to inhibit placental PGH synthase (cyclooxygenase) and also allow the anti-inflammatory effects of low-dose aspirin.     --------------------------------   Early studies also suggested that metformin therapy reduced the risk of gestational diabetes mellitus (GDM), but this was not confirmed in randomized trials.  The incidence of GDM is severalfold higher in women with PCOS than in the general obstetric population. Two observational studies suggested that metformin use was associated with a lower risk of GDM, but this was not confirmed in a trial of 273 pregnancies among 257 women with PCOS who were randomly assigned to receive metformin (2000 mg/day) or placebo from the first trimester until delivery. There was no significant difference in prevalence of GDM between groups (metformin 17.6 percent versus placebo 16.9 percent)  However, in utero exposure to metformin from the first trimester to birth among women with PCOS may be associated with excess weight of those children at 4 years of age .    The ADA recommends avoiding metformin in patients with hypertension, preeclampsia, or at risk for intrauterine growth restriction due to the potential for growth restriction or acidosis in the setting of placental insufficiency; however, any clinical impact of this effect has not been observed in human pregnancies. The American College of Obstetricians and Gynecologists (ACOG) and the Society for Maternal-Fetal Medicine do not include this caveat in  their recommendations.     OB ULTRASOUND REPORT   See imaging tab for complete ultrasound report or in PACS    Single IUP in transverse presentation.    Placenta is posterior.   A 3 vessel cord is noted.  Cardiac activity is present at 150 bpm   g ( 0 lb 13 oz);    MVP is 3.9 cm .         Fetal Anatomy:  Visualized with normal appearance: head, face, spine, neck, skin, chest, abdominal wall, gastrointestinal tract, kidneys, bladder, extremities.   Brain: Visualized and normal appearances: brain parenchyma, cerebral ventricles, choroid plexus, Cisterna Magna, midline falx, cerebellum, cerebellar lobes, posterior fossa, vermis, cavum septi pellucidi.  Face: eyes normal, profile normal, nose normal, lip normal, palate normal.  Heart: visualized and normal appearance: 3 vessel view, four-chamber, left outflow tract, right outflow tract, arches.      Genetic Sonogram:  Nuchal fold normal.  Pyelectasis absent.  No hyperechogenic bowel.  Echogenic intracardiac foci absent. Nasal bone present. Choroid plexus cyst absent.      Summary of Ultrasound findings:  This is a Pat pregnancy    The fetal measurements are consistent with established EDC. No gross ultrasound evidence of structural abnormalities are seen today. No minor markers for aneuploidy are seen. The patient understands that ultrasound cannot rule out all structural and chromosomal abnormalities.       IMPRESSION:   1. IUP @  20w3d  2. Scan consistent with dates  3. No fetal structural abnormalities seen  4. Obesity BMI 44  5.  Chronic hypertension  6.  PCOS    RECOMMENDATIONS:   1.  Weekly NST at 32wks  2.  Monthly growth ultrasounds starting at 28 weeks, add BPP to each growth ultrasound at 32 weeks and beyond  3.  Early 1 hr gtt  4.  11-20 lb weight gain for pregnancy  5.  Delivery 38-39 weeks  6.   Stop Metformin one week before doing her GTT to get an accurate diagnosis  7.  Aspirin  mg daily    Thank you for allowing me to participate in  the care of your patient.  Please do not hesitate to call with any questions or concerns.     Total time spent   45  minutes this calendar day which includes preparing to see the patient including chart review, obtaining and/or reviewing additional medical history, performing a physical exam and evaluation, documenting clinical information in the electronic medical record, independently interpreting results, counseling the patient, communicating results to the patient/family/caregiver and coordinating care.    Ravi Wong D.O.  Maternal Fetal Medicine     Note to patient and family:  The 21st Century Cures Act makes medical notes available to patients in the interest of transparency.  However, please be advised that this is a medical document.  It is intended as a peer to peer communication.  It is written in medical language and may contain abbreviations or verbiage that are technical and unfamiliar.  It may appear blunt or direct.  Medical documents are intended to carry relevant information, facts as evident, and the clinical opinion of the practitioner.

## 2024-01-22 NOTE — PROGRESS NOTES
Pt here for Level II Ultrasound  +fm noted per patient  Pt noted she may have a UTI, burning with urination, pt denies further complaints.

## 2024-02-22 ENCOUNTER — HOSPITAL ENCOUNTER (OUTPATIENT)
Dept: ENDOCRINOLOGY | Age: 29
Discharge: HOME | End: 2024-02-22
Attending: OBSTETRICS & GYNECOLOGY
Payer: COMMERCIAL

## 2024-02-22 DIAGNOSIS — O24.410 DIET CONTROLLED GESTATIONAL DIABETES MELLITUS (GDM) IN THIRD TRIMESTER (HCC): Primary | ICD-10-CM

## 2024-02-22 NOTE — PROGRESS NOTES
Sharlene Solo  : 1995 was seen for Gestational Diabetes Counseling: Individual  25 weeks gestation   Taking  mg daily per OB order  Sharlene Solo verbally consents to a telemedicine service with live, interactive video and audio on 2024.  Patient understands and accepts financial responsibility for any deductible, co-insurance and/or co-pays associated with this service.    Date: 2024   Start time: 2:15 pm End time: 3:15 pm    Obtained usual diet history:   B: may skip  L: sausage brat on a bun with flavored water  D: Chicken and rice with corn, with flavored water      Education:     GDM Overview:  Reviewed gestational diabetes as diagnosis including target blood glucose values.  Benefits, risks, and management options for improving/maintaining glucose control to mother/baby discussed.    Instructed /demonstrated ability to perform blood glucose testing on: One Touch Gustavo  Discussed monitoring ketones.    Healthy Eating:  Discussed nutrition concepts for pregnancy/healthy eating and effects of food on BG value.  Timing of meals; what is a carbohydrate, protein, fat.  Taught: Carb counting, label reading, meal planning.  Suggested Calorie Level: 2000    Being Active:  Benefits and effects of activity on BG discussed.     Monitoring:  Instructed on how to use glucose monitor/proper lancet disposal. Testing schedules are:   Fastin-95 mg/dL, Call MD if >95 mg/dL twice in 1 week   2 Hour Post Prandial:  120 md/dL, Call MD if >120 mg/dL twice in 1 week.    Taking Medication:  Reviewed when medication might be indicated.    Reducing Risk:  Effects of elevated blood glucose on mother/baby reviewed.  Discussed management (hyperglycemia, hypoglycemia, sick day, DKA, other) and when to call provider.  Post pregnancy management/prevention of Type 2 DM, and increased risk of having diabetes later in life reviewed.    Healthy Coping:  Family involvement/social support  encouraged.  Identification of lifestyle behaviors willing to change discussed.    Training Tools Provided:  Printed materials covering each topic provided.  Support Plan provided and reviewed.    Patient Chosen Goals:      1. Follow recommended GDM meal plan.   2. Begin testing fasting glucose and 2 hours after meals   3. Bring glucose log to each MD office visit.   4. Encouraged activity if no restrictions.   5. Encouraged Sharlene MELISSA Roshniselena to call diabetes center with any questions or concerns.    Patient verbalized understanding and has no further questions at this time.    Yue Kimball RN,MSN, [facility]

## 2024-03-05 ENCOUNTER — HOSPITAL ENCOUNTER (OUTPATIENT)
Dept: ENDOCRINOLOGY | Age: 29
Discharge: HOME | End: 2024-03-05
Attending: OBSTETRICS & GYNECOLOGY
Payer: COMMERCIAL

## 2024-03-05 ENCOUNTER — ULTRASOUND ENCOUNTER (OUTPATIENT)
Dept: PERINATAL CARE | Facility: HOSPITAL | Age: 29
End: 2024-03-05
Attending: OBSTETRICS & GYNECOLOGY
Payer: COMMERCIAL

## 2024-03-05 VITALS
HEART RATE: 97 BPM | BODY MASS INDEX: 45.48 KG/M2 | SYSTOLIC BLOOD PRESSURE: 123 MMHG | HEIGHT: 66 IN | WEIGHT: 283 LBS | DIASTOLIC BLOOD PRESSURE: 81 MMHG

## 2024-03-05 DIAGNOSIS — O16.3 HYPERTENSION AFFECTING PREGNANCY IN THIRD TRIMESTER (HCC): ICD-10-CM

## 2024-03-05 DIAGNOSIS — E66.01 MORBID OBESITY (HCC): ICD-10-CM

## 2024-03-05 DIAGNOSIS — O99.213 MATERNAL MORBID OBESITY, ANTEPARTUM, THIRD TRIMESTER (HCC): ICD-10-CM

## 2024-03-05 DIAGNOSIS — O99.212 MATERNAL MORBID OBESITY IN SECOND TRIMESTER, ANTEPARTUM (HCC): ICD-10-CM

## 2024-03-05 DIAGNOSIS — O16.2 HYPERTENSION AFFECTING PREGNANCY IN SECOND TRIMESTER (HCC): ICD-10-CM

## 2024-03-05 DIAGNOSIS — O99.212 MATERNAL MORBID OBESITY IN SECOND TRIMESTER, ANTEPARTUM (HCC): Primary | ICD-10-CM

## 2024-03-05 DIAGNOSIS — Z98.891 H/O: C-SECTION: ICD-10-CM

## 2024-03-05 DIAGNOSIS — O16.9 HYPERTENSION AFFECTING PREGNANCY (HCC): ICD-10-CM

## 2024-03-05 DIAGNOSIS — O24.410 DIET CONTROLLED GESTATIONAL DIABETES MELLITUS (GDM) IN THIRD TRIMESTER (HCC): Primary | ICD-10-CM

## 2024-03-05 DIAGNOSIS — E66.01 MATERNAL MORBID OBESITY IN SECOND TRIMESTER, ANTEPARTUM (HCC): ICD-10-CM

## 2024-03-05 DIAGNOSIS — O24.414 INSULIN CONTROLLED GESTATIONAL DIABETES MELLITUS (GDM) IN SECOND TRIMESTER (HCC): Primary | ICD-10-CM

## 2024-03-05 DIAGNOSIS — E66.01 MATERNAL MORBID OBESITY IN SECOND TRIMESTER, ANTEPARTUM (HCC): Primary | ICD-10-CM

## 2024-03-05 DIAGNOSIS — O24.419 GESTATIONAL DIABETES MELLITUS (GDM) IN THIRD TRIMESTER, GESTATIONAL DIABETES METHOD OF CONTROL UNSPECIFIED (HCC): ICD-10-CM

## 2024-03-05 DIAGNOSIS — E66.01 MATERNAL MORBID OBESITY, ANTEPARTUM, THIRD TRIMESTER (HCC): ICD-10-CM

## 2024-03-05 PROCEDURE — 76816 OB US FOLLOW-UP PER FETUS: CPT | Performed by: OBSTETRICS & GYNECOLOGY

## 2024-03-05 RX ORDER — ASPIRIN 81 MG/1
81 TABLET ORAL DAILY
COMMUNITY

## 2024-03-05 RX ORDER — INSULIN GLARGINE-YFGN 100 [IU]/ML
25 INJECTION, SOLUTION SUBCUTANEOUS NIGHTLY
Qty: 5 ML | Refills: 3 | Status: SHIPPED | OUTPATIENT
Start: 2024-03-05

## 2024-03-05 RX ORDER — CONTAINER,EMPTY
EACH MISCELLANEOUS
Qty: 1 EACH | Refills: 1 | Status: SHIPPED | OUTPATIENT
Start: 2024-03-05

## 2024-03-05 NOTE — PROGRESS NOTES
Sharlene Solo  : 1995 was seen for GDM  Individual Follow-Up Counseling  1:1   Sharlene Solo verbally consents to a telemedicine service with live, interactive video and audio on 3/5/2024.  Patient understands and accepts financial responsibility for any deductible, co-insurance and/or co-pays associated with this service.    Date: 3/5/2024  Referring Provider:  Start time: 1030 End time: 1100    Assessment: LMP 2023 (Exact Date)   Weight:   Wt Readings from Last 6 Encounters:   24 274 lb   10/13/23 250 lb   23 255 lb   23 279 lb   23 296 lb   22 300 lb       Blood Glucose: FBG: have been slightly elevated ranging from . PP: All post prandial numbers have been within limits  Ketones:     Gestational Diabetes goals assessed by patient: 3 - ½ the time, Barrier to attaining goals discussed and additional modifications to goals made     Diet:     Following meal plan: yes  Skips: no meals skipped  Meals are Balanced.  Carb Intake is adequate.  Protein Intake is adequate.  Food Selections are .    Exercise:     walking for 15 minutes 4-5 times a week.    Education:     GDM Overview:  Reviewed target blood glucose values for GDM.  Discussed benefits/risks to mother/baby management options to improve/maintain glucose control.     Healthy Eating:  Reviewed/Reinforced:  Nutrition concepts for pregnancy/healthy eating and effect of food on blood glucose.  Meal planning process and benefits of pre-planning meals/snacks.  Appropriate timing of meals/snacks.  Carb counting.  Additional concepts: Increasing fiber    Being Active:  Reviewed benefits of effects of activity on BG values.  Reviewed types of activity, duration, precautions.    Monitoring:  Instructed to report readings to MD as directed.  Call MD: if FBG is > 95 twice in 1 week.     If 2 hr PP is > 120 twice in 1 week at any one meal.  Call Diabetic Educator is ketones are consistently elevated.    Taking  Medication:  Reviewed appropriate timing (if on insulin) of meds.   Reviewed probability of needing medication adjustments throughout pregnancy.     Reducing Risk:  Discussed management of (hyperglycemia, hypoglycemia) and when to call provider.    Recommendations:      1. Follow recommended meal plan.   2. Test blood glucose and ketones as directed.    3. Bring glucose log to each MD visit.   4. Start/continue activity if no restrictions.    5. Additional recommendations: continue to follow GDM protocol and follow up with OB regarding increased fasting numbers.  [facility] need [facility]      Patient verbalized understanding and has no further questions at this time.    Yue Kimball, RN,MSN, CDCES, PMHNP-BC

## 2024-03-05 NOTE — PROGRESS NOTES
Outpatient Maternal-Fetal Medicine Follow-Up     Dear Dr. Whitehead,     Thank you for requesting ultrasound evaluation and maternal fetal medicine consultation on your patient Sharlene Solo.  As you are aware she is a 29 year old female  with a Pat pregnancy and an Estimated Date of Delivery: 24.  She returned to maternal-fetal medicine today for a follow-up visit and new consultation for gestational diabetes.  Her history was reviewed from her prior visit and there were no interval changes other than she has been diagnosed with diabetes of pregnancy.     Antepartum Risk Factors  Maternal morbid obesity  Chronic hypertension  PCOS  Gestational diabetes    S: no complaints.  Feeling FM.  She takes metformin 500 mg every evening.  She stopped for 1 week prior to taking the 1 hour glucose tolerance test.  She took that few weeks ago and reports that the result was 110 mg/dL.  She has now been taking the metformin again and is finding her fasting blood sugars are .  After breakfast her numbers are generally within range after lunch she will occasionally have a reading in the 120 range.  After dinner 5 out of 11 are 120-125 mg/dL    PHYSICAL EXAMINATION:  /81 (BP Location: Right arm, Patient Position: Sitting, Cuff Size: large)   Pulse 97   Ht 5' 6\" (1.676 m)   Wt 283 lb (128.4 kg)   LMP 2023 (Exact Date)   BMI 45.68 kg/m²   General: alert and oriented, no acute distress  Abdomen: gravid, soft, non-tender  Extremities: non-tender, no edema     OBSTETRIC ULTRASOUND  The patient had a fetal growth ultrasound today which I interpreted the results and reviewed them with the patient.    Ultrasound Findings:  Single IUP in breech presentation.    Placenta is posterior.   Cardiac activity is present at 151 bpm  EFW 1100 g ( 2 lb 7 oz); 67%.    MVP is 4.5 cm . MERARI 11.9 cm    The fetal measurements are consistent with established EDC. No gross ultrasound evidence of structural abnormalities  are seen today. The patient understands that ultrasound cannot rule out all structural and chromosomal abnormalities.     See imaging tab for the complete US report.       DISCUSSION  During her visit we discussed and reviewed the following issues:     OBESITY:   Her BMI prior to pregnancy was greater than 40  Obesity during pregnancy is associated with numerous maternal and  risks which were discussed previously and reviewed.   See MFM note from 2024 for detailed review.     -----------------  HYPERTENSION  Patient Review  The patient has a history of hypertension.  She is controlled on labetalol 200 twice a day.    Discussion:  We reviewed the potential implications associated with chronic hypertension.   I informed the patient that chronic hypertension increases the risk of pre-eclampsia, placental abruption, IUGR, fetal birth defects (cardiac and neural tube), and fetal demise and possible need for  delivery.  The signs and symptoms of preeclampsia were discussed.   We also discussed the potential risks and benefits of low dose aspirin and anti-hypertensive medication.  She understands that a better diet, weight loss and routine exercise can help her blood pressure for the rest of her life.  We discussed the morbidity associated with hypertension including heart disease, strokes and kidney disease.   See MFM note from 2024 for detailed review.      BP Review  The patient is presently taking anti-hypertensive medications.  Her blood pressure readings at home have been 120's/80's.       Medical Regimen Recommendation: Continue labetalol and low-dose ASA.     Continued medication use and home blood pressure assessments were reviewed as well as recommendations for serial growth ultrasounds and antepartum testing.       Prevention of Preeclampsia  The role of low-dose aspirin to prevent preeclampsia was discussed previously and reviewed.  See MFM note from 2024 for detailed review.      --------------------------------  PCOS & Metformin use -    Pregnant women with PCOS appear to be at increased risk of developing gestational discussed previously.  See MFM note from 1/22/2024 for detailed review    GESTATIONAL DIABETES    She reports her 1 hour glucose tolerance test was 210 mg/dL    The patient was informed of the potential implications and risks associated with GDM to her and her fetus, especially when poorly controlled. We discussed the increased incidence of macrosomia and the potential for related birth injury to her and her baby. We talked about the increase risks associated risk of fetal hyperinsulinemia, jaundice, electrolyte imbalance, seizure activity, IUFD and other adverse obstetric outcomes. We also reviewed her  increased risk of having diabetes later in life. The importance of good glycemic control and avoidance of prolonged hypoglycemia and hyperglycemia was addressed.    She was instructed on the diabetic diet; her diet was modified to provide three meals and three snacks with fewer carbohydrates.  She received instruction on self-monitoring of blood glucose.  She was advised to assess  her blood sugars four times daily (fasting and 2 hour postprandially).   Fasting blood glucose should be less than 95 mg/dl and two hour postprandial values should be less than 120 mg/dl.   She was advised to send her capillary blood glucose records to our office for weekly MFM review.     Her capillary blood glucose records were reviewed today; her compliance with the recommended four times daily assessments (fasting and two-hour post-prandial) is excellent.   Her overall glucose control is inadequate.    The patient is presently on diet therapy; her compliance with her diabetic diet regimen was reviewed and it is fair.     The patient is presently taking Metformin 500 mg every evening after dinner; her compliance with her medication regimen was reviewed and it is good.     We compared and  contrasted insulin (long and short acting) and metformin to manage blood sugars in pregnancy.  We discussed insulin as the gold standard therapy in pregnancy and that it does not cross to the fetal circulation.  Metformin is increasingly being used in pregnancy but the long term data on / childhood effects are lacking.  Metformin crosses the placenta and  levels are >70% of the maternal level at delivery.  Metformin is not associated with increased rates for NICU admission,  hypoglycemia or LGA when its use controls the blood sugars to the recommended targets.  There is emerging information,however, that there is an increase in childhood obesity, and possibly metabolic syndrome in children exposed to Metformin throughout the entire pregnancy.  Currently, use in the third trimester for management of gestational diabetes is within the standard of care.     Based on the above discussion and the need for improved glycemic control, Irma is agreeable to going on insulin.  She will also stop the metformin.  She reports that it does give her stomach upset so she is happy to stop it.      Medical Regimen Recommendation:   Continue ADA diet; improvements in her bedtime snack were suggested  Stop metformin  Glargine  25 units at bedtime  Send blood sugar logs to [facility] weekly for review     We discussed the recommended plan of care based on her  risk factors.  Sharlene had her questions answered to her satisfaction.    IMPRESSION:  IUP at 26w4d  Normal fetal growth  Maternal morbid obesity  Chronic hypertension complicating pregnancy  PCOS  Gestational diabetes, A2  History of  for failure to progress     RECOMMENDATIONS:  Continue care with Dr. Whitehead  Weekly NST at 32 weeks, increase to twice weekly at 34 weeks if medications are required to control her diabetes  Monthly growth and BPP ultrasound  Continue labetalol and low-dose aspirin  Begin sending blood sugar logs to [facility] weekly for  review  Insulin as noted above  Stop metformin  Delivery is advised at 38 to 39 weeks unless an indication for earlier delivery arises  Daily low-dose aspirin      Total time spent 40 minutes this calendar day which includes preparing to see the patient including chart review, obtaining and/or reviewing additional medical history, performing a physical exam and evaluation, documenting clinical information in the electronic medical record, independently interpreting results, counseling the patient, communicating results to the patient/family/caregiver and coordinating care.     Case discussed with patient who demonstrated understanding and agreement with plan.     Thank you for allowing me to participate in the care of this patient.  Please feel free to contact me with any questions.    Harriett Boles MD  Maternal-Fetal Medicine       Note to patient and family:  The 21st Century Cures Act makes medical notes available to patients in the interest of transparency.  However, please be advised that this is a medical document.  It is intended as a peer to peer communication.  It is written in medical language and may contain abbreviations or verbiage that are technical and unfamiliar.  It may appear blunt or direct.  Medical documents are intended to carry relevant information, facts as evident, and the clinical opinion of the practitioner.

## 2024-03-13 ENCOUNTER — TELEPHONE (OUTPATIENT)
Dept: PERINATAL CARE | Facility: HOSPITAL | Age: 29
End: 2024-03-13

## 2024-03-13 NOTE — TELEPHONE ENCOUNTER
27w5d  Received BS log for date range 3/6-3/11     Low  High Out of Range   Fasting Blood Sugar 90 110 4/6   Post Breakfast 100 106 0/5   Post Lunch 97 150 1/6   Post Dinner 94 119 0/6     Patient is currently taking Semglee 25 units at bedtime.   To Dr. Sanchez for review.

## 2024-04-02 ENCOUNTER — OFFICE VISIT (OUTPATIENT)
Dept: PERINATAL CARE | Facility: HOSPITAL | Age: 29
End: 2024-04-02
Attending: OBSTETRICS & GYNECOLOGY
Payer: COMMERCIAL

## 2024-04-02 ENCOUNTER — HOSPITAL ENCOUNTER (OUTPATIENT)
Facility: HOSPITAL | Age: 29
Discharge: HOME | End: 2024-04-02
Attending: OBSTETRICS & GYNECOLOGY | Admitting: OBSTETRICS & GYNECOLOGY
Payer: COMMERCIAL

## 2024-04-02 VITALS
DIASTOLIC BLOOD PRESSURE: 88 MMHG | RESPIRATION RATE: 20 BRPM | SYSTOLIC BLOOD PRESSURE: 136 MMHG | BODY MASS INDEX: 45.16 KG/M2 | HEART RATE: 87 BPM | HEIGHT: 66 IN | WEIGHT: 281 LBS | TEMPERATURE: 97 F

## 2024-04-02 VITALS
BODY MASS INDEX: 45.16 KG/M2 | HEIGHT: 66 IN | DIASTOLIC BLOOD PRESSURE: 86 MMHG | HEART RATE: 103 BPM | WEIGHT: 281 LBS | SYSTOLIC BLOOD PRESSURE: 129 MMHG

## 2024-04-02 DIAGNOSIS — O24.419 GESTATIONAL DIABETES MELLITUS (GDM) IN THIRD TRIMESTER, GESTATIONAL DIABETES METHOD OF CONTROL UNSPECIFIED (HCC): ICD-10-CM

## 2024-04-02 DIAGNOSIS — E66.01 MORBID OBESITY (HCC): ICD-10-CM

## 2024-04-02 DIAGNOSIS — E66.01 MATERNAL MORBID OBESITY, ANTEPARTUM, THIRD TRIMESTER (HCC): ICD-10-CM

## 2024-04-02 DIAGNOSIS — O16.3 HYPERTENSION AFFECTING PREGNANCY IN THIRD TRIMESTER (HCC): ICD-10-CM

## 2024-04-02 DIAGNOSIS — E66.01 MATERNAL MORBID OBESITY, ANTEPARTUM, THIRD TRIMESTER (HCC): Primary | ICD-10-CM

## 2024-04-02 DIAGNOSIS — O99.213 MATERNAL MORBID OBESITY, ANTEPARTUM, THIRD TRIMESTER (HCC): ICD-10-CM

## 2024-04-02 DIAGNOSIS — Z87.59 HISTORY OF GESTATIONAL HYPERTENSION: ICD-10-CM

## 2024-04-02 DIAGNOSIS — O99.213 MATERNAL MORBID OBESITY, ANTEPARTUM, THIRD TRIMESTER (HCC): Primary | ICD-10-CM

## 2024-04-02 LAB
ALBUMIN SERPL-MCNC: 2.7 G/DL (ref 3.4–5)
ALBUMIN/GLOB SERPL: 0.7 {RATIO} (ref 1–2)
ALP LIVER SERPL-CCNC: 103 U/L
ALT SERPL-CCNC: 11 U/L
ANION GAP SERPL CALC-SCNC: 8 MMOL/L (ref 0–18)
AST SERPL-CCNC: 8 U/L (ref 15–37)
BASOPHILS # BLD AUTO: 0.07 X10(3) UL (ref 0–0.2)
BASOPHILS NFR BLD AUTO: 0.5 %
BILIRUB SERPL-MCNC: 0.2 MG/DL (ref 0.1–2)
BUN BLD-MCNC: 6 MG/DL (ref 9–23)
CALCIUM BLD-MCNC: 9.4 MG/DL (ref 8.5–10.1)
CHLORIDE SERPL-SCNC: 109 MMOL/L (ref 98–112)
CO2 SERPL-SCNC: 20 MMOL/L (ref 21–32)
CREAT BLD-MCNC: 0.51 MG/DL
CREAT UR-SCNC: 248 MG/DL
EGFRCR SERPLBLD CKD-EPI 2021: 130 ML/MIN/1.73M2 (ref 60–?)
EOSINOPHIL # BLD AUTO: 0.34 X10(3) UL (ref 0–0.7)
EOSINOPHIL NFR BLD AUTO: 2.4 %
ERYTHROCYTE [DISTWIDTH] IN BLOOD BY AUTOMATED COUNT: 13.1 %
FASTING STATUS PATIENT QL REPORTED: NO
GLOBULIN PLAS-MCNC: 4.1 G/DL (ref 2.8–4.4)
GLUCOSE BLD-MCNC: 86 MG/DL (ref 70–99)
HCT VFR BLD AUTO: 35 %
HGB BLD-MCNC: 12.3 G/DL
IMM GRANULOCYTES # BLD AUTO: 0.06 X10(3) UL (ref 0–1)
IMM GRANULOCYTES NFR BLD: 0.4 %
LYMPHOCYTES # BLD AUTO: 3.63 X10(3) UL (ref 1–4)
LYMPHOCYTES NFR BLD AUTO: 26.1 %
MCH RBC QN AUTO: 29.3 PG (ref 26–34)
MCHC RBC AUTO-ENTMCNC: 35.1 G/DL (ref 31–37)
MCV RBC AUTO: 83.3 FL
MONOCYTES # BLD AUTO: 0.74 X10(3) UL (ref 0.1–1)
MONOCYTES NFR BLD AUTO: 5.3 %
NEUTROPHILS # BLD AUTO: 9.08 X10 (3) UL (ref 1.5–7.7)
NEUTROPHILS # BLD AUTO: 9.08 X10(3) UL (ref 1.5–7.7)
NEUTROPHILS NFR BLD AUTO: 65.3 %
OSMOLALITY SERPL CALC.SUM OF ELEC: 281 MOSM/KG (ref 275–295)
PLATELET # BLD AUTO: 435 10(3)UL (ref 150–450)
POTASSIUM SERPL-SCNC: 4.1 MMOL/L (ref 3.5–5.1)
PROT SERPL-MCNC: 6.8 G/DL (ref 6.4–8.2)
PROT UR-MCNC: 28.4 MG/DL
PROT/CREAT UR-RTO: 0.11
RBC # BLD AUTO: 4.2 X10(6)UL
SODIUM SERPL-SCNC: 137 MMOL/L (ref 136–145)
URATE SERPL-MCNC: 3.6 MG/DL
WBC # BLD AUTO: 13.9 X10(3) UL (ref 4–11)

## 2024-04-02 PROCEDURE — 84550 ASSAY OF BLOOD/URIC ACID: CPT | Performed by: OBSTETRICS & GYNECOLOGY

## 2024-04-02 PROCEDURE — 99213 OFFICE O/P EST LOW 20 MIN: CPT

## 2024-04-02 PROCEDURE — 36415 COLL VENOUS BLD VENIPUNCTURE: CPT

## 2024-04-02 PROCEDURE — 80053 COMPREHEN METABOLIC PANEL: CPT | Performed by: OBSTETRICS & GYNECOLOGY

## 2024-04-02 PROCEDURE — 84156 ASSAY OF PROTEIN URINE: CPT | Performed by: OBSTETRICS & GYNECOLOGY

## 2024-04-02 PROCEDURE — 59025 FETAL NON-STRESS TEST: CPT

## 2024-04-02 PROCEDURE — 82570 ASSAY OF URINE CREATININE: CPT | Performed by: OBSTETRICS & GYNECOLOGY

## 2024-04-02 PROCEDURE — 85025 COMPLETE CBC W/AUTO DIFF WBC: CPT | Performed by: OBSTETRICS & GYNECOLOGY

## 2024-04-02 PROCEDURE — 76819 FETAL BIOPHYS PROFIL W/O NST: CPT

## 2024-04-02 PROCEDURE — 76816 OB US FOLLOW-UP PER FETUS: CPT | Performed by: OBSTETRICS & GYNECOLOGY

## 2024-04-02 NOTE — PROGRESS NOTES
Outpatient Maternal-Fetal Medicine Follow-Up     Dear Dr. Whitehead,     Thank you for requesting ultrasound evaluation and maternal fetal medicine consultation on your patient Sharlene Solo.  As you are aware she is a 29 year old female  with a Pat pregnancy and an Estimated Date of Delivery: 24.  She returned to maternal-fetal medicine today for a follow-up visit and new consultation for gestational diabetes.  Her history was reviewed from her prior visit and there were no interval changes other than she has been diagnosed with diabetes of pregnancy.     Antepartum Risk Factors  Maternal morbid obesity  Chronic hypertension  PCOS  Gestational diabetes, A2    S: no complaints.  She is very pleased that her blood sugars are now normalized with the glargine insulin at night.  She has no headaches, swelling, or nausea vomiting.  She reports good fetal movement    PHYSICAL EXAMINATION:  /86 (BP Location: Left arm, Patient Position: Sitting, Cuff Size: large)   Pulse 103   Ht 5' 6\" (1.676 m)   Wt 281 lb (127.5 kg)   LMP 2023 (Exact Date)   BMI 45.35 kg/m²   141/98 P - 98  General: alert and oriented, no acute distress  Abdomen: obese, gravid, soft, non-tender  Extremities: non-tender, no edema     OBSTETRIC ULTRASOUND  The patient had a fetal growth 7 BPP ultrasound today which I interpreted the results and reviewed them with the patient.    Ultrasound Findings:  Single IUP in cephalic presentation.    Placenta is posterior.   A 3 vessel cord is noted.  Cardiac activity is present at 150 bpm  EFW 1859 g ( 4 lb 2 oz); 68%.    MERARI is  14.9 cm.  MVP is 4.8 cm  BPP is 8/8.     The fetal measurements are consistent with established EDC. No gross ultrasound evidence of structural abnormalities are seen today. The patient understands that ultrasound cannot rule out all structural and chromosomal abnormalities.     See imaging tab for the complete US report.       DISCUSSION  During her visit  we discussed and reviewed the following issues:     OBESITY:   Her BMI prior to pregnancy was greater than 40  Obesity during pregnancy is associated with numerous maternal and  risks which were discussed previously and reviewed.   See [facility] note from 2024 for detailed review.     -----------------  HYPERTENSION  Patient Review  The patient has a history of hypertension.  She is controlled on labetalol 200 twice a day.    Discussion:  We reviewed the potential implications associated with chronic hypertension.   I informed the patient that chronic hypertension increases the risk of pre-eclampsia, placental abruption, IUGR, fetal birth defects (cardiac and neural tube), and fetal demise and possible need for  delivery.  The signs and symptoms of preeclampsia were discussed.   We also discussed the potential risks and benefits of low dose aspirin and anti-hypertensive medication.  She understands that a better diet, weight loss and routine exercise can help her blood pressure for the rest of her life.  We discussed the morbidity associated with hypertension including heart disease, strokes and kidney disease.   See MFM note from 2024 for detailed review.      BP Review  The patient is presently taking anti-hypertensive medications.  She reports that she has not been taking her blood pressure at home     Medical Regimen Recommendation: Continue labetalol and low-dose ASA.     Continued medication use and home blood pressure assessments were reviewed as well as recommendations for serial growth ultrasounds and antepartum testing.       Prevention of Preeclampsia  The role of low-dose aspirin to prevent preeclampsia was discussed previously and reviewed.  See MFM note from 2024 for detailed review.     --------------------------------  PCOS & Metformin use -    Pregnant women with PCOS appear to be at increased risk of developing gestational discussed previously.  See MFM note from 2024  for detailed review    GESTATIONAL DIABETES - follow up    She reports her 1 hour glucose tolerance test was 210 mg/dL    We reviewed the potential implications and risks associated with GDM to her and her fetus, especially when poorly controlled. We discussed the increased incidence of macrosomia and the potential for related birth injury to her and her baby. We talked about the increase risks associated risk of fetal hyperinsulinemia, jaundice, electrolyte imbalance, seizure activity, IUFD and other adverse obstetric outcomes. We also reviewed her  increased risk of having diabetes later in life. The importance of good glycemic control and avoidance of prolonged hypoglycemia and hyperglycemia was addressed.  See [facility] note from 3/5/2024 for detailed review      Her capillary blood glucose records were reviewed today; her compliance with the recommended four times daily assessments (fasting and two-hour post-prandial) is excellent.   Her overall glucose control is good.    The patient is presently on diet therapy; her compliance with her diabetic diet regimen was reviewed and it is fair.     The patient is presently taking glargine insulin 32 units at bedtime.      Medical Regimen Recommendation:   Continue ADA diet; improvements in her bedtime snack were suggested  Glargine  32 units at bedtime  Send blood sugar logs to [facility] weekly for review     We discussed the recommended plan of care based on her  risk factors.  Sharlene had her questions answered to her satisfaction.    IMPRESSION:  IUP at 30w4d  Normal fetal growth and  testing  Maternal morbid obesity  Chronic hypertension complicating pregnancy; elevated blood pressure reading x 2 in the office today  PCOS  Gestational diabetes, A2  History of  for failure to progress     RECOMMENDATIONS:  Continue care with Dr. Whitehead  Weekly NST at 32 weeks, increase to twice weekly at 34 weeks if medications are required to control her  diabetes  Monthly growth and BPP ultrasound  Continue labetalol and low-dose aspirin  Begin sending blood sugar logs to [facility] weekly for review  Insulin as noted above  Stop metformin  Delivery is advised at 38 to 39 weeks unless an indication for earlier delivery arises  Daily low-dose aspirin   She was sent to triage for preeclampsia evaluation.  Dr. Whitehead notified.  Will not administer betamethasone at this time unless she is going to be admitted for preeclampsia     Total time spent 40 minutes this calendar day which includes preparing to see the patient including chart review, obtaining and/or reviewing additional medical history, performing a physical exam and evaluation, documenting clinical information in the electronic medical record, independently interpreting results, counseling the patient, communicating results to the patient/family/caregiver and coordinating care.     Case discussed with patient who demonstrated understanding and agreement with plan.     Thank you for allowing me to participate in the care of this patient.  Please feel free to contact me with any questions.    Harriett Boles MD  Maternal-Fetal Medicine       Note to patient and family:  The 21st Century Cures Act makes medical notes available to patients in the interest of transparency.  However, please be advised that this is a medical document.  It is intended as a peer to peer communication.  It is written in medical language and may contain abbreviations or verbiage that are technical and unfamiliar.  It may appear blunt or direct.  Medical documents are intended to carry relevant information, facts as evident, and the clinical opinion of the practitioner.

## 2024-04-02 NOTE — NST
Nonstress Test   Patient: Sharlene Solo    Gestation: 30w4d    NST:       Variability: Moderate           Accelerations: Yes           Decelerations: Variable            Baseline: 125 BPM           Uterine Irritability: No           Contractions: Not present                                        Acoustic Stimulator: No           Nonstress Test Interpretation: Appropriate for gestational age           Nonstress Test Second Interpretation: Appropriate for gestational age                     Additional Comments

## 2024-04-02 NOTE — DISCHARGE INSTRUCTIONS
Understanding Preeclampsia  Preeclampsia is a condition that can happen in pregnancy. It includes high blood pressure (hypertension), swelling, and signs of organ problems. It can show up around week 20 of pregnancy. It often goes away by 12 weeks after you give birth. It can lead to serious health risks for you and your baby. During your pregnancy, your healthcare provider will watch your blood pressure.     Your blood pressure will be monitored regularly throughout your pregnancy to help check for preeclampsia.     Dangers of preeclampsia   If not treated, preeclampsia can cause problems for you and your baby. The placenta is the organ that nourishes your baby. It may tear away from the wall of the uterus. This can put the baby at risk for health problems (fetal distress). It can put the baby at risk for  birth. Preeclampsia can also cause these health problems in you:  Kidney failure or other organ damage  Seizures  Stroke  Who’s at risk for preeclampsia?   No one knows what causes preeclampsia. It can happen in any pregnant person. But there are things that increase your risk. You may need to take a daily low dose of aspirin if you are at risk for preeclampsia.  You’re at higher risk for preeclampsia if you have any of these:  Diabetes  High blood pressure  Obesity  Kidney disease  Autoimmune disease such as lupus  A family history of preeclampsia  You’re at higher risk if any of these apply to you:  This is your first pregnancy  You are having twins or more  You’re under age 18 or over age 40  You used in vitro fertilization  You are Black  And you’re at higher risk if you had any of these in a past pregnancy:  Preeclampsia  Intrauterine growth restriction (IUGR)   birth  Placental abruption  Fetal death  Symptoms  A common symptom of preeclampsia is high blood pressure. Other symptoms may include:  Fast weight gain  Protein in your urine  Headache  Belly (abdominal) pain on your right  side  Vision problems such as flashes or spots  Swelling (edema) in your face or hands (this often happens near the end of a normal pregnancy)  Tests you may have  Your healthcare provider will want to check your blood pressure. This will need to be done often in your pregnancy. If your blood pressure is high, you may have these tests:  Urine tests to look for protein  Blood tests to confirm preeclampsia  Fetal monitoring to make sure that your baby is healthy  Treating preeclampsia  Preeclampsia almost always ends soon after you give birth. Until then, your healthcare provider can help you manage it.  If your symptoms are mild, you may to:     Limit your activity  Rest in bed  Not do heavy lifting     If your symptoms are severe, you will stay in the hospital. Treatment here may include:  Limits to your activity. This is to help control your blood pressure. You should not lift anything heavy. You will need to spend 8 hours a day lying down with your feet up.  Magnesium IV (intravenous) drip. This is done during labor. It's to prevent seizures  Induced labor or  section.  When to call your healthcare provider  Call your healthcare provider if your symptoms start quickly or are severe. This includes swelling, weight gain, or other symptoms. Some cases of preeclampsia are more severe than others. Your symptoms also may change or get worse as you get closer to your due date.  Once you give birth  In most cases, preeclampsia goes away on its own soon after you give birth. This is often by the 12th week after you deliver. Within days after you give birth, your blood pressure, swelling, and other symptoms should get better. But for some people, problems from preeclampsia can continue after birth.  Postpartum preeclampsia   Preeclampsia that starts after birth is rare. There are 2 types:  Postpartum preeclampsia. This may start in the first 48 hours after birth.  Late-onset preeclampsia. This starts more than 48  hours after birth.  Both of these types are rare. But call your healthcare provider right away if you have symptoms of preeclampsia after you give birth.  How daily issues affect your health  Many things in your daily life impact your health. This can include transportation, money problems, housing, access to food, and . If you can’t get to medical appointments, you may not receive the care you need. When money is tight, it may be difficult to pay for medicines. And living far from a grocery store can make it hard to buy healthy food.  If you have concerns in any of these or other areas, talk with your healthcare team. They may know of local resources to assist you. Or they may have a staff person who can help.  [employer] last reviewed this educational content on 10/1/2021  © 0942-7991 [employer]. All rights reserved. This information is not intended as a substitute for professional medical care. Always follow your healthcare professional's instructions.      Discharge Instructions    Diet: Diabetic  Activity: Normal activity         General Instructions    Call your OB doctor if: Fluid leaking from your vagina;Uterine contractions increasing in intensity and frequency;Vaginal bleeding;Vaginal or rectal pressure;Uterine contractions 10 minutes or closer for 1 to 2 hours;Decrease in fetal movement;Temperature greater than 100F  Early labor comfort measures: Drink fluids and eat small light meals;Relax, sleep, take a warm bath or shower for 30 minutes or less;Take a walk

## 2024-04-02 NOTE — PROGRESS NOTES
Pt is a 29 year old female admitted to TRG2/TRG2-A.     Chief Complaint   Patient presents with    R/o Preeclampsia     Sent from [facility] for r/o PIH.       Pt is  30w4d intra-uterine pregnancy.  History obtained. Oriented to room, staff, and plan of care.

## 2024-04-16 ENCOUNTER — TELEPHONE (OUTPATIENT)
Dept: PERINATAL CARE | Facility: HOSPITAL | Age: 29
End: 2024-04-16

## 2024-04-16 RX ORDER — INSULIN GLARGINE-YFGN 100 [IU]/ML
34 INJECTION, SOLUTION SUBCUTANEOUS NIGHTLY
COMMUNITY
Start: 2024-04-16

## 2024-04-16 NOTE — TELEPHONE ENCOUNTER
32w4d  Received BS log for date range 4/9-4/15     Low  High Out of Range   Fasting Blood Sugar 72 97 3/7   Post Breakfast 99 117 0/7   Post Lunch 102 119 0/6   Post Dinner 99 134* 2/7     *pt noted she was at a party, other post dinner high=120    Patient is currently taking:    Semglee 32 units HS     To Dr. Chavez for review.

## 2024-04-19 ENCOUNTER — HOSPITAL ENCOUNTER (OUTPATIENT)
Facility: HOSPITAL | Age: 29
Setting detail: OBSERVATION
Discharge: HOME | End: 2024-04-20
Attending: OBSTETRICS & GYNECOLOGY | Admitting: OBSTETRICS & GYNECOLOGY
Payer: COMMERCIAL

## 2024-04-19 ENCOUNTER — ULTRASOUND ENCOUNTER (OUTPATIENT)
Dept: PERINATAL CARE | Facility: HOSPITAL | Age: 29
End: 2024-04-19
Attending: OBSTETRICS & GYNECOLOGY
Payer: COMMERCIAL

## 2024-04-19 DIAGNOSIS — E66.01 MORBID OBESITY DUE TO EXCESS CALORIES (HCC): Primary | ICD-10-CM

## 2024-04-19 PROBLEM — Z34.90 PREGNANCY (HCC): Status: ACTIVE | Noted: 2024-04-19

## 2024-04-19 LAB
ALBUMIN SERPL-MCNC: 2.7 G/DL (ref 3.4–5)
ALBUMIN/GLOB SERPL: 0.6 {RATIO} (ref 1–2)
ALP LIVER SERPL-CCNC: 108 U/L
ALT SERPL-CCNC: 10 U/L
ANION GAP SERPL CALC-SCNC: 7 MMOL/L (ref 0–18)
AST SERPL-CCNC: 11 U/L (ref 15–37)
BASOPHILS # BLD AUTO: 0.06 X10(3) UL (ref 0–0.2)
BASOPHILS NFR BLD AUTO: 0.4 %
BILIRUB SERPL-MCNC: 0.2 MG/DL (ref 0.1–2)
BUN BLD-MCNC: 6 MG/DL (ref 9–23)
CALCIUM BLD-MCNC: 9 MG/DL (ref 8.5–10.1)
CHLORIDE SERPL-SCNC: 107 MMOL/L (ref 98–112)
CO2 SERPL-SCNC: 21 MMOL/L (ref 21–32)
CREAT BLD-MCNC: 0.7 MG/DL
CREAT UR-SCNC: 109 MG/DL
EGFRCR SERPLBLD CKD-EPI 2021: 121 ML/MIN/1.73M2 (ref 60–?)
EOSINOPHIL # BLD AUTO: 0.2 X10(3) UL (ref 0–0.7)
EOSINOPHIL NFR BLD AUTO: 1.2 %
ERYTHROCYTE [DISTWIDTH] IN BLOOD BY AUTOMATED COUNT: 13.1 %
FASTING STATUS PATIENT QL REPORTED: NO
GLOBULIN PLAS-MCNC: 4.2 G/DL (ref 2.8–4.4)
GLUCOSE BLD-MCNC: 146 MG/DL (ref 70–99)
GLUCOSE BLD-MCNC: 158 MG/DL (ref 70–99)
HCT VFR BLD AUTO: 37 %
HGB BLD-MCNC: 12 G/DL
IMM GRANULOCYTES # BLD AUTO: 0.09 X10(3) UL (ref 0–1)
IMM GRANULOCYTES NFR BLD: 0.5 %
LYMPHOCYTES # BLD AUTO: 3.74 X10(3) UL (ref 1–4)
LYMPHOCYTES NFR BLD AUTO: 22.4 %
MCH RBC QN AUTO: 28.2 PG (ref 26–34)
MCHC RBC AUTO-ENTMCNC: 32.4 G/DL (ref 31–37)
MCV RBC AUTO: 86.9 FL
MONOCYTES # BLD AUTO: 0.58 X10(3) UL (ref 0.1–1)
MONOCYTES NFR BLD AUTO: 3.5 %
NEUTROPHILS # BLD AUTO: 12.06 X10 (3) UL (ref 1.5–7.7)
NEUTROPHILS # BLD AUTO: 12.06 X10(3) UL (ref 1.5–7.7)
NEUTROPHILS NFR BLD AUTO: 72 %
OSMOLALITY SERPL CALC.SUM OF ELEC: 280 MOSM/KG (ref 275–295)
PLATELET # BLD AUTO: 465 10(3)UL (ref 150–450)
POTASSIUM SERPL-SCNC: 4 MMOL/L (ref 3.5–5.1)
PROT SERPL-MCNC: 6.9 G/DL (ref 6.4–8.2)
PROT UR-MCNC: 22.6 MG/DL
PROT/CREAT UR-RTO: 0.21
RBC # BLD AUTO: 4.26 X10(6)UL
SODIUM SERPL-SCNC: 135 MMOL/L (ref 136–145)
URATE SERPL-MCNC: 4.1 MG/DL
WBC # BLD AUTO: 16.7 X10(3) UL (ref 4–11)

## 2024-04-19 PROCEDURE — 76815 OB US LIMITED FETUS(S): CPT

## 2024-04-19 PROCEDURE — 84156 ASSAY OF PROTEIN URINE: CPT | Performed by: OBSTETRICS & GYNECOLOGY

## 2024-04-19 PROCEDURE — 80053 COMPREHEN METABOLIC PANEL: CPT | Performed by: OBSTETRICS & GYNECOLOGY

## 2024-04-19 PROCEDURE — 36415 COLL VENOUS BLD VENIPUNCTURE: CPT

## 2024-04-19 PROCEDURE — 82570 ASSAY OF URINE CREATININE: CPT | Performed by: OBSTETRICS & GYNECOLOGY

## 2024-04-19 PROCEDURE — 85025 COMPLETE CBC W/AUTO DIFF WBC: CPT | Performed by: OBSTETRICS & GYNECOLOGY

## 2024-04-19 PROCEDURE — 99214 OFFICE O/P EST MOD 30 MIN: CPT

## 2024-04-19 PROCEDURE — 84550 ASSAY OF BLOOD/URIC ACID: CPT | Performed by: OBSTETRICS & GYNECOLOGY

## 2024-04-19 PROCEDURE — 82962 GLUCOSE BLOOD TEST: CPT

## 2024-04-19 PROCEDURE — 96372 THER/PROPH/DIAG INJ SC/IM: CPT

## 2024-04-19 PROCEDURE — 76819 FETAL BIOPHYS PROFIL W/O NST: CPT | Performed by: OBSTETRICS & GYNECOLOGY

## 2024-04-19 PROCEDURE — 96360 HYDRATION IV INFUSION INIT: CPT

## 2024-04-19 RX ORDER — CHOLECALCIFEROL (VITAMIN D3) 25 MCG
1 TABLET,CHEWABLE ORAL DAILY
Status: DISCONTINUED | OUTPATIENT
Start: 2024-04-19 | End: 2024-04-20

## 2024-04-19 RX ORDER — BUTALBITAL, ACETAMINOPHEN AND CAFFEINE 50; 325; 40 MG/1; MG/1; MG/1
2 TABLET ORAL EVERY 4 HOURS PRN
Status: DISCONTINUED | OUTPATIENT
Start: 2024-04-19 | End: 2024-04-20

## 2024-04-19 RX ORDER — HYDRALAZINE HYDROCHLORIDE 20 MG/ML
10 INJECTION INTRAMUSCULAR; INTRAVENOUS ONCE AS NEEDED
Status: DISCONTINUED | OUTPATIENT
Start: 2024-04-19 | End: 2024-04-20

## 2024-04-19 RX ORDER — ACETAMINOPHEN 500 MG
1000 TABLET ORAL EVERY 6 HOURS PRN
Status: DISCONTINUED | OUTPATIENT
Start: 2024-04-19 | End: 2024-04-20

## 2024-04-19 RX ORDER — SODIUM CHLORIDE, SODIUM LACTATE, POTASSIUM CHLORIDE, CALCIUM CHLORIDE 600; 310; 30; 20 MG/100ML; MG/100ML; MG/100ML; MG/100ML
INJECTION, SOLUTION INTRAVENOUS CONTINUOUS
Status: DISCONTINUED | OUTPATIENT
Start: 2024-04-19 | End: 2024-04-20

## 2024-04-19 RX ORDER — CALCIUM CARBONATE 500 MG/1
1000 TABLET, CHEWABLE ORAL
Status: DISCONTINUED | OUTPATIENT
Start: 2024-04-19 | End: 2024-04-20

## 2024-04-19 RX ORDER — ENOXAPARIN SODIUM 100 MG/ML
40 INJECTION SUBCUTANEOUS DAILY
Status: DISCONTINUED | OUTPATIENT
Start: 2024-04-20 | End: 2024-04-20

## 2024-04-19 RX ORDER — ZOLPIDEM TARTRATE 5 MG/1
5 TABLET ORAL NIGHTLY PRN
Status: DISCONTINUED | OUTPATIENT
Start: 2024-04-19 | End: 2024-04-20

## 2024-04-19 RX ORDER — DOCUSATE SODIUM 100 MG/1
100 CAPSULE, LIQUID FILLED ORAL 2 TIMES DAILY
Status: DISCONTINUED | OUTPATIENT
Start: 2024-04-19 | End: 2024-04-20

## 2024-04-19 RX ORDER — LABETALOL HYDROCHLORIDE 5 MG/ML
40 INJECTION, SOLUTION INTRAVENOUS ONCE AS NEEDED
Status: DISCONTINUED | OUTPATIENT
Start: 2024-04-19 | End: 2024-04-20

## 2024-04-19 RX ORDER — INSULIN DEGLUDEC 100 U/ML
34 INJECTION, SOLUTION SUBCUTANEOUS NIGHTLY
Status: DISCONTINUED | OUTPATIENT
Start: 2024-04-19 | End: 2024-04-20

## 2024-04-19 RX ORDER — LABETALOL 200 MG/1
200 TABLET, FILM COATED ORAL EVERY 12 HOURS SCHEDULED
Status: DISCONTINUED | OUTPATIENT
Start: 2024-04-19 | End: 2024-04-20

## 2024-04-19 RX ORDER — ACETAMINOPHEN 500 MG
500 TABLET ORAL EVERY 6 HOURS PRN
Status: DISCONTINUED | OUTPATIENT
Start: 2024-04-19 | End: 2024-04-20

## 2024-04-19 RX ORDER — LABETALOL HYDROCHLORIDE 5 MG/ML
20 INJECTION, SOLUTION INTRAVENOUS ONCE AS NEEDED
Status: DISCONTINUED | OUTPATIENT
Start: 2024-04-19 | End: 2024-04-20

## 2024-04-19 RX ORDER — BETAMETHASONE SODIUM PHOSPHATE AND BETAMETHASONE ACETATE 3; 3 MG/ML; MG/ML
12 INJECTION, SUSPENSION INTRA-ARTICULAR; INTRALESIONAL; INTRAMUSCULAR; SOFT TISSUE EVERY 24 HOURS
Status: COMPLETED | OUTPATIENT
Start: 2024-04-19 | End: 2024-04-20

## 2024-04-19 RX ORDER — LABETALOL HYDROCHLORIDE 5 MG/ML
80 INJECTION, SOLUTION INTRAVENOUS ONCE AS NEEDED
Status: DISCONTINUED | OUTPATIENT
Start: 2024-04-19 | End: 2024-04-20

## 2024-04-19 NOTE — PROGRESS NOTES
Pt is a 29 year old female admitted to TRG4/TRG4-A.     Chief Complaint   Patient presents with    R/o Preeclampsia     Ptient sent from the office with elevated BP.  Patient states BP at home last night 157/108 and 146/103.  This morning 149/102.  C/O KARIMI, denies change in vision or epigastric pain.  DTR +2.  Absent clonus      Pt is  33w0d intra-uterine pregnancy.  History obtained, consents signed. Oriented to room, staff, and plan of care.

## 2024-04-19 NOTE — PLAN OF CARE
Problem: ANTEPARTUM/LABOR and DELIVERY  Goal: Maintain pregnancy as long as maternal and/or fetal condition is stable  Description: INTERVENTIONS:  - Maternal surveillance  - Fetal surveillance  - Monitor uterine activity  - Medications as ordered  - Bedrest  Outcome: Progressing  Goal: Anxiety is at manageable level  Description: INTERVENTIONS:  - [place] patient to unit/surroundings  - Establish a trusting relationship with patient  - Discuss possible complications or alterations in birth plan  - Explain treatment plan  - Explain testing/procedures prior to initiation  - Encourage participation in care  - Encourage verbalization of concerns/fears  - Identify coping mechanisms  - Administer/offer alternative therapies (Aroma therapy, distraction, guided imagery, massage, music therapy, therapeutic touch)  - Manage patient's environment (Avoid overstimulation of patient)  Outcome: Progressing  Goal: Demonstrates ability to cope with hospitalization/illness  Description: INTERVENTIONS:  - Encourage verbalization of feelings/concerns/expectations  - Provide quiet environment  - Assist patient to identify own strengths and abilities  - Encourage patient to set small goals for self  - Encourage participation in diversional activity  - Reinforce positive adaptation of new coping behaviors  - Include patient/family/caregiver in decisions  Outcome: Progressing     Problem: Patient/Family Goals  Goal: Patient/Family Long Term Goal  Description: Patient's Long Term Goal: Discharge home    Interventions:  - See additional Care Plan goals for specific interventions  Outcome: Progressing  Goal: Patient/Family Short Term Goal  Description: Patient's Short Term Goal: Blood pressures are within adequate limits for a chronic hypertensive     Interventions:   - See additional Care Plan goals for specific interventions  Outcome: Progressing

## 2024-04-19 NOTE — CONSULTS
[facility]  Maternal-Fetal Medicine Inpatient Consultation    Date of Admission:  2024  Date of Consult:  2024    Reason for Consult:   Elevated BP and HA    History of Present Illness:  Sharlene Solo is a a(n) 29 year old female.  with an IUP at Gestational Age: 33w0d    Who was sent from her OB office for elevated blood pressures.  He has a history of chronic hypertension and is on labetalol 200 mg twice daily.  She is also on insulin for gestational diabetes.  Other  risk factors that she has morbid obesity.    Patient reports she monitors her blood pressure at home and was having blood pressure readings 146-157/103-108.  This began last night and she was just feeling off which is why she started checking her blood pressures at home.  She also reports that she awoke with a headache this morning she took 1 g of Tylenol.  It did not improve her headache.  She rated her headache pain 8 out of 10 throughout the day.  She saw Dr. Whitehead in the office and had elevated blood pressure there so she was sent to L&D triage for evaluation for possible superimposed preeclampsia.  In triage she has been given IV fluids and another gram of Tylenol she reports her headache is improving.  She currently rates her headache a 5 out of 8.  She denies any nausea or vomiting or abdominal pain.  She reports good fetal movement.  She reports that last night she thought her face looked swollen but today it is improved.    Review of History:      OB History:    OB History    Para Term  AB Living   2 1 1 0 0 1   SAB IAB Ectopic Multiple Live Births   0 0 0 0 1      # Outcome Date GA Lbr Matthew/2nd Weight Sex Type Anes PTL Lv   2 Current            1 Term 14 39w1d  7 lb 3.3 oz (3.27 kg) M CS-LTranv EPI N BERNARDO      Complications: Failure to progress      Apgar1: 9  Apgar5: 9       Other Relevant History:  Past Medical History:    ADHD (attention deficit hyperactivity disorder)      NO MEDS DURING PREG    Allergic rhinitis, cause unspecified    Anxiety    Bipolar 1 disorder (HCC)    NO MEDS DURING PREG    Bronchitis, not specified as acute or chronic    Essential hypertension    Gestational diabetes (HCC)    Infectious mononucleosis    Intractable vomiting with nausea, unspecified vomiting type    Otitis    Pneumonia    Pneumonia, organism unspecified(486)    as      Swollen tonsil     Past Surgical History:   Procedure Laterality Date          Contracept iud      IUD insertion 3/20/18 per Margaret/Dr. Lopez's office    D&c after delivery N/A 2022    Endoscopic ultrasound exam  2011    Tonsillectomy       Family History   Problem Relation Age of Onset    Diabetes Father     Heart Disorder Father     Heart Attack Father     Hypertension Father     Other (Other) Maternal Grandmother     Diabetes Paternal Grandmother     Diabetes Maternal Grandfather       reports that she has quit smoking. Her smoking use included cigarettes. She has a 4 pack-year smoking history. She has never used smokeless tobacco. She reports that she does not currently use alcohol. She reports that she does not use drugs.    Allergies:  Allergies   Allergen Reactions    Amoxicillin RASH    Augmentin [Amoxicillin-Pot Clavulanate] RASH    Doxycycline NAUSEA ONLY    Penicillins RASH       Medications:    Current Facility-Administered Medications:     acetaminophen (Tylenol Extra Strength) tab 1,000 mg, 1,000 mg, Oral, Q6H PRN    Physical examination:  Physical Exam:   General: Alert, orientated x3.  Cooperative.  No apparent distress.  Vital Signs:  Temp:  [99.9 °F (37.7 °C)] 99.9 °F (37.7 °C)  Pulse:  [] 91  Resp:  [18] 18  BP: (132-172)/(77-92) 136/77  HEENT: Exam is unremarkable.  Abdomen:  obese, Gravid uterus appropriate for GA Nontender.  Extremities:  No lower extremity edema noted.  Skin: Normal texture and turgor.    Fetal tracing -   Fetal heart rate baseline is 130 bpm.  Moderate variability.   No decelerations.  Accelerations are noted.  Category 1 tracing  [place] -no regular contractions        Laboratory Data:  Lab Results   Component Value Date    WBC 16.7 2024    HGB 12.0 2024    HCT 37.0 2024    .0 2024    CREATSERUM 0.70 2024    BUN 6 2024     2024    K 4.0 2024     2024    CO2 21.0 2024     2024    CA 9.0 2024    ALB 2.7 2024    ALKPHO 108 2024    BILT 0.2 2024    TP 6.9 2024    AST 11 2024    ALT 10 2024   Urine pr/cr ratio 0.21 (it was 0.1 on 24)    Fetal Ultrasound:  46225; 95240    Ultrasound Findings:  Single IUP in cephalic presentation.    Placenta is posterior.   Cardiac activity is present at 132 bpm  MVP is 4 cm . MERARI 11 cm  BPP is 8/8.     Impression:  1.  Single IUP in cephalic presentation  2.  Normal MERARI  3.  Reassuring  testing      NARRATIVE:   I discussed with Irma and her significant other my concern that she is developing preeclampsia.  I reviewed chronic hypertension with superimposed gestational hypertension and chronic hypertension with superimposed preeclampsia.  We discussed signs and symptoms of severe features of gestational hypertension and or preeclampsia.  She had 1 severe range blood pressure reading but the other ones have been lower than that.  We also discussed that headache can be a symptom of severe gestational hypertension or severe preeclampsia.    I also discussed with them that her protein creatinine ratio on  was 0.1 and today it is 0.21.  Typically we use a reading of 0.3 or greater as to be consistent with significant proteinuria however there can be fluctuations and false negatives and false positives on a protein creatinine ratio.  Because of this, I advised that she remain in the hospital for observation and to complete a 24-hour urine.  We will check her blood pressure every 2 hours even while she is  asleep overnight.  She will be able to continue on her regular GDM diet and I will order her insulin for tonight and we will check her blood sugar 4 times daily.    If she is diagnosed with preeclampsia, she would be at increased risk for delivery in the next 7 to 10 days, therefore I would proceed with a course of  corticosteroids.  I explained to the patient that if betamethasone becomes indicated, it would likely raise her blood sugars and we we will likely need to increase her insulin dosing accordingly.  This effect is usually transient but sometimes it persists.    We discussed that she is taking Semglee 34 units subcu at night.  The hospital does not carry this medication so she will get a substitute while she is here.    The maternal and fetal risks of severe hypertension and preeclampsia were discussed at length.  She is agreeable to the observation admission for additional evaluation to clarify the diagnosis.    We also discussed that if she is cleared to outpatient management, she will likely need to begin twice weekly  testing now rather than waiting until 34 weeks.    IMPRESSION:    IUP at 33w0d  Reassuring fetal status  Chronic hypertension with superimposed preeclampsia versus gestational hypertension  GDM A2  Maternal morbid obesity  History of a     RECOMMENDATIONS:   Observation admission to complete a 24-hour urine  Repeat serum labs in the morning  Serial blood pressure readings every 2 hours around-the-clock  GDM diet and blood sugar monitoring 4 times daily (fasting, 2 hours after meals)  Tresiba 34 units subcu nightly  Labetalol 200 mg twice daily  IV treatment of hypertension if she meets criteria of severe blood pressure    If she returns to outpatient management and has preeclampsia without severe features -   Weekly labs with Dr. Whitehead  Twice weekly  testing (NST with Dr. Gutiérrez's office, BPP with [facility])  Monthly growth ultrasoundDelivery if she develops  symptoms, severe range BP, or lab changes consistent with severe features   Delivery at 37 weeks if she continues to have preeclampsia without severe features.    Discussed the patient with Dr. Whitehead  Total time spent 60 minutes this calendar day which includes preparing to see the patient including chart review, obtaining and/or reviewing additional medical history, performing a physical exam and evaluation, documenting clinical information in the electronic medical record, independently interpreting results, counseling the patient, communicating results to the patient/family/caregiver and coordinating care.     Case discussed with patient who demonstrated understanding and agreement with plan.     Thank you for allowing me to participate in the care of this patient.  Please feel free to contact me with any questions.    Harriett Boles MD  Maternal-Fetal Medicine  4/19/2024

## 2024-04-20 VITALS
HEART RATE: 93 BPM | SYSTOLIC BLOOD PRESSURE: 141 MMHG | HEIGHT: 66 IN | TEMPERATURE: 98 F | RESPIRATION RATE: 18 BRPM | BODY MASS INDEX: 45.64 KG/M2 | DIASTOLIC BLOOD PRESSURE: 69 MMHG | WEIGHT: 284 LBS

## 2024-04-20 LAB
ALBUMIN SERPL-MCNC: 2.6 G/DL (ref 3.4–5)
ALBUMIN/GLOB SERPL: 0.7 {RATIO} (ref 1–2)
ALP LIVER SERPL-CCNC: 108 U/L
ALT SERPL-CCNC: 9 U/L
AMPHET UR QL SCN: NEGATIVE
ANION GAP SERPL CALC-SCNC: 8 MMOL/L (ref 0–18)
ANTIBODY SCREEN: NEGATIVE
AST SERPL-CCNC: 13 U/L (ref 15–37)
BASOPHILS # BLD AUTO: 0.04 X10(3) UL (ref 0–0.2)
BASOPHILS NFR BLD AUTO: 0.2 %
BENZODIAZ UR QL SCN: NEGATIVE
BILIRUB SERPL-MCNC: 0.1 MG/DL (ref 0.1–2)
BUN BLD-MCNC: 5 MG/DL (ref 9–23)
CALCIUM BLD-MCNC: 9.2 MG/DL (ref 8.5–10.1)
CANNABINOIDS UR QL SCN: NEGATIVE
CHLORIDE SERPL-SCNC: 108 MMOL/L (ref 98–112)
CO2 SERPL-SCNC: 21 MMOL/L (ref 21–32)
COCAINE UR QL: NEGATIVE
CREAT BLD-MCNC: 0.49 MG/DL
CREAT UR-SCNC: 37.3 MG/DL
EGFRCR SERPLBLD CKD-EPI 2021: 132 ML/MIN/1.73M2 (ref 60–?)
EOSINOPHIL # BLD AUTO: 0 X10(3) UL (ref 0–0.7)
EOSINOPHIL NFR BLD AUTO: 0 %
ERYTHROCYTE [DISTWIDTH] IN BLOOD BY AUTOMATED COUNT: 13.2 %
FASTING STATUS PATIENT QL REPORTED: YES
GLOBULIN PLAS-MCNC: 4 G/DL (ref 2.8–4.4)
GLUCOSE BLD-MCNC: 148 MG/DL (ref 70–99)
GLUCOSE BLD-MCNC: 154 MG/DL (ref 70–99)
GLUCOSE BLD-MCNC: 159 MG/DL (ref 70–99)
HCT VFR BLD AUTO: 33.3 %
HGB BLD-MCNC: 11.2 G/DL
IMM GRANULOCYTES # BLD AUTO: 0.13 X10(3) UL (ref 0–1)
IMM GRANULOCYTES NFR BLD: 0.8 %
LYMPHOCYTES # BLD AUTO: 2.05 X10(3) UL (ref 1–4)
LYMPHOCYTES NFR BLD AUTO: 12.1 %
M PROTEIN 24H UR ELPH-MRATE: 292.6 MG/24 HR (ref ?–149.1)
MCH RBC QN AUTO: 28.7 PG (ref 26–34)
MCHC RBC AUTO-ENTMCNC: 33.6 G/DL (ref 31–37)
MCV RBC AUTO: 85.4 FL
MDMA UR QL SCN: NEGATIVE
MONOCYTES # BLD AUTO: 0.36 X10(3) UL (ref 0.1–1)
MONOCYTES NFR BLD AUTO: 2.1 %
NEUTROPHILS # BLD AUTO: 14.37 X10 (3) UL (ref 1.5–7.7)
NEUTROPHILS # BLD AUTO: 14.37 X10(3) UL (ref 1.5–7.7)
NEUTROPHILS NFR BLD AUTO: 84.8 %
OPIATES UR QL SCN: NEGATIVE
OSMOLALITY SERPL CALC.SUM OF ELEC: 284 MOSM/KG (ref 275–295)
OXYCODONE UR QL SCN: NEGATIVE
PLATELET # BLD AUTO: 437 10(3)UL (ref 150–450)
POTASSIUM SERPL-SCNC: 4.1 MMOL/L (ref 3.5–5.1)
PROT SERPL-MCNC: 6.6 G/DL (ref 6.4–8.2)
RBC # BLD AUTO: 3.9 X10(6)UL
RH BLOOD TYPE: POSITIVE
SODIUM SERPL-SCNC: 137 MMOL/L (ref 136–145)
SPECIMEN VOL UR: 3800 ML
WBC # BLD AUTO: 17 X10(3) UL (ref 4–11)

## 2024-04-20 PROCEDURE — 96372 THER/PROPH/DIAG INJ SC/IM: CPT

## 2024-04-20 PROCEDURE — 80307 DRUG TEST PRSMV CHEM ANLYZR: CPT | Performed by: OBSTETRICS & GYNECOLOGY

## 2024-04-20 PROCEDURE — 80053 COMPREHEN METABOLIC PANEL: CPT | Performed by: OBSTETRICS & GYNECOLOGY

## 2024-04-20 PROCEDURE — 82962 GLUCOSE BLOOD TEST: CPT

## 2024-04-20 PROCEDURE — 85025 COMPLETE CBC W/AUTO DIFF WBC: CPT | Performed by: OBSTETRICS & GYNECOLOGY

## 2024-04-20 PROCEDURE — 96361 HYDRATE IV INFUSION ADD-ON: CPT

## 2024-04-20 PROCEDURE — 86900 BLOOD TYPING SEROLOGIC ABO: CPT | Performed by: OBSTETRICS & GYNECOLOGY

## 2024-04-20 PROCEDURE — 86901 BLOOD TYPING SEROLOGIC RH(D): CPT | Performed by: OBSTETRICS & GYNECOLOGY

## 2024-04-20 PROCEDURE — 84156 ASSAY OF PROTEIN URINE: CPT | Performed by: OBSTETRICS & GYNECOLOGY

## 2024-04-20 PROCEDURE — 36415 COLL VENOUS BLD VENIPUNCTURE: CPT

## 2024-04-20 PROCEDURE — 86850 RBC ANTIBODY SCREEN: CPT | Performed by: OBSTETRICS & GYNECOLOGY

## 2024-04-20 RX ORDER — LABETALOL 200 MG/1
200 TABLET, FILM COATED ORAL 3 TIMES DAILY
Status: DISCONTINUED | OUTPATIENT
Start: 2024-04-20 | End: 2024-04-20

## 2024-04-20 RX ORDER — LABETALOL 200 MG/1
200 TABLET, FILM COATED ORAL 3 TIMES DAILY
Qty: 90 TABLET | Refills: 2 | Status: SHIPPED | OUTPATIENT
Start: 2024-04-20

## 2024-04-20 RX ORDER — INSULIN DEGLUDEC 100 U/ML
40 INJECTION, SOLUTION SUBCUTANEOUS NIGHTLY
Status: DISCONTINUED | OUTPATIENT
Start: 2024-04-20 | End: 2024-04-20

## 2024-04-20 NOTE — DISCHARGE INSTRUCTIONS
Rest at home    Discharge Instructions    Diet: diabetic  REST AT HOME           General Instructions    Call your OB doctor if: Fluid leaking from your vagina;Decrease in fetal movement;Vaginal or rectal pressure;Vaginal bleeding;Uterine contractions 10 minutes or closer for 1 to 2 hours;Uterine contractions increasing in intensity and frequency;Temperature greater than 100F

## 2024-04-20 NOTE — PLAN OF CARE
Problem: ANTEPARTUM/LABOR and DELIVERY  Goal: Maintain pregnancy as long as maternal and/or fetal condition is stable  Description: INTERVENTIONS:  - Maternal surveillance  - Fetal surveillance  - Monitor uterine activity  - Medications as ordered  - Bedrest  Outcome: Progressing  Goal: Anxiety is at manageable level  Description: INTERVENTIONS:  - [place] patient to unit/surroundings  - Establish a trusting relationship with patient  - Discuss possible complications or alterations in birth plan  - Explain treatment plan  - Explain testing/procedures prior to initiation  - Encourage participation in care  - Encourage verbalization of concerns/fears  - Identify coping mechanisms  - Administer/offer alternative therapies (Aroma therapy, distraction, guided imagery, massage, music therapy, therapeutic touch)  - Manage patient's environment (Avoid overstimulation of patient)  Outcome: Progressing  Goal: Demonstrates ability to cope with hospitalization/illness  Description: INTERVENTIONS:  - Encourage verbalization of feelings/concerns/expectations  - Provide quiet environment  - Assist patient to identify own strengths and abilities  - Encourage patient to set small goals for self  - Encourage participation in diversional activity  - Reinforce positive adaptation of new coping behaviors  - Include patient/family/caregiver in decisions  Outcome: Progressing     Problem: Patient/Family Goals  Goal: Patient/Family Long Term Goal  Description: Patient's Long Term Goal: Pregnancy would carry to term gestation    Interventions:  1. Administer antihypertensive drugs as ordered  2. Maintain blood pressures lower than 160/110  3. Assess for pre-eclampsia signs and symptoms  Outcome: Progressing  Goal: Patient/Family Short Term Goal  Description: Patient's Short Term Goal: Blood pressures are within adequate limits for a chronic hypertensive     Interventions:   1. Monitor intake and output  2. Administer anti-hypertensive's as  ordered  3. Assess for PIH signs and symptoms  Outcome: Progressing     Problem: GASTROINTESTINAL - ADULT  Goal: Minimal or absence of nausea and vomiting  Description: INTERVENTIONS:  - Maintain adequate hydration with IV or PO as ordered and tolerated  - Nasogastric tube to low intermittent suction as ordered  - Evaluate effectiveness of ordered antiemetic medications  - Provide nonpharmacologic comfort measures as appropriate  - Advance diet as tolerated, if ordered  - Obtain nutritional consult as needed  - Evaluate fluid balance  Outcome: Progressing  Goal: Maintains or returns to baseline bowel function  Description: INTERVENTIONS:  - Assess bowel function  - Maintain adequate hydration with IV or PO as ordered and tolerated  - Evaluate effectiveness of GI medications  - Encourage mobilization and activity  - Obtain nutritional consult as needed  - Establish a toileting routine/schedule  - Consider collaborating with pharmacy to review patient's medication profile  Outcome: Progressing  Goal: Maintains adequate nutritional intake (undernourished)  Description: INTERVENTIONS:  - Monitor percentage of each meal consumed  - Identify factors contributing to decreased intake, treat as appropriate  - Assist with meals as needed  - Monitor I&O, WT and lab values  - Obtain nutritional consult as needed  - Optimize oral hygiene and moisture  - Encourage food from home; allow for food preferences  - Enhance eating environment  Outcome: Progressing  Goal: Achieves appropriate nutritional intake (bariatric)  Description: INTERVENTIONS:  - Monitor for over-consumption  - Identify factors contributing to increased intake, treat as appropriate  - Monitor I&O, WT and lab values  - Obtain nutritional consult as needed  - Evaluate psychosocial factors contributing to over-consumption  Outcome: Progressing

## 2024-04-20 NOTE — CONSULTS
[facility]  Maternal-Fetal Medicine Inpatient Consultation    Date of Admission:  2024  Date of Consult:  2024    Reason for Consult:   Elevated BP and HA    She had severe range pressures yesterday late afternoon.  Labetalol was given and BPs have been normal since then.  Denies headache or visual changes.  No swelling.    History of Present Illness:  Sharlene Solo is a a(n) 29 year old female.  with an IUP at Gestational Age: 33w0d    Who was sent from her OB office for elevated blood pressures.  He has a history of chronic hypertension and is on labetalol 200 mg twice daily.  She is also on insulin for gestational diabetes.  Other  risk factors that she has morbid obesity.    Patient reports she monitors her blood pressure at home and was having blood pressure readings 146-157/103-108.  This began last night and she was just feeling off which is why she started checking her blood pressures at home.  She also reports that she awoke with a headache this morning she took 1 g of Tylenol.  It did not improve her headache.  She rated her headache pain 8 out of 10 throughout the day.  She saw Dr. Whitehead in the office and had elevated blood pressure there so she was sent to L&D triage for evaluation for possible superimposed preeclampsia.  In triage she has been given IV fluids and another gram of Tylenol she reports her headache is improving.  She currently rates her headache a 5 out of 8.  She denies any nausea or vomiting or abdominal pain.  She reports good fetal movement.  She reports that last night she thought her face looked swollen but today it is improved.    Review of History:      OB History:    OB History    Para Term  AB Living   4 1 1 0 2 1   SAB IAB Ectopic Multiple Live Births   2 0 0 0 1      # Outcome Date GA Lbr Matthew/2nd Weight Sex Type Anes PTL Lv   4 Current            3 SAB 2023 7w0d          2 SAB 2023 7w0d          1 Term 14  39w1d  7 lb 3.3 oz (3.27 kg) M CS-LTranv EPI N BERNARDO      Complications: Failure to progress      Apgar1: 9  Apgar5: 9       Other Relevant History:  Past Medical History:    ADHD (attention deficit hyperactivity disorder)     NO MEDS DURING PREG    Allergic rhinitis, cause unspecified    Anxiety    Bipolar 1 disorder (HCC)    NO MEDS DURING PREG    Bronchitis, not specified as acute or chronic    Essential hypertension    Gestational diabetes (HCC)    Infectious mononucleosis    Intractable vomiting with nausea, unspecified vomiting type    Otitis    Pneumonia    Pneumonia, organism unspecified(486)    as      Swollen tonsil     Past Surgical History:   Procedure Laterality Date          Contracept iud      IUD insertion 3/20/18 per Margaret/Dr. Lopez's office    D&c after delivery N/A 2022    Endoscopic ultrasound exam      Tonsillectomy       Family History   Problem Relation Age of Onset    Diabetes Father     Heart Disorder Father     Heart Attack Father     Hypertension Father     Other (Other) Maternal Grandmother     Diabetes Paternal Grandmother     Diabetes Maternal Grandfather       reports that she has quit smoking. Her smoking use included cigarettes. She has a 4 pack-year smoking history. She has never used smokeless tobacco. She reports that she does not currently use alcohol. She reports that she does not use drugs.    Allergies:  Allergies   Allergen Reactions    Amoxicillin RASH    Augmentin [Amoxicillin-Pot Clavulanate] RASH    Doxycycline NAUSEA ONLY    Penicillins RASH       Medications:    Current Facility-Administered Medications:     acetaminophen (Tylenol Extra Strength) tab 1,000 mg, 1,000 mg, Oral, Q6H PRN    lactated ringers infusion, , Intravenous, Continuous    enoxaparin (Lovenox) 40 MG/0.4ML SUBQ injection 40 mg, 40 mg, Subcutaneous, Daily    acetaminophen (Tylenol Extra Strength) tab 500 mg, 500 mg, Oral, Q6H PRN **OR** acetaminophen (Tylenol Extra Strength) tab  1,000 mg, 1,000 mg, Oral, Q6H PRN    zolpidem (Ambien) tab 5 mg, 5 mg, Oral, Nightly PRN    docusate sodium (Colace) cap 100 mg, 100 mg, Oral, BID    calcium carbonate (Tums) chewable tab 1,000 mg, 1,000 mg, Oral, Daily PRN    prenatal vitamin with DHA (PNV with DHA) cap 1 capsule, 1 capsule, Oral, Daily    labetalol (Normodyne) tab 200 mg, 200 mg, Oral, 2 times per day    insulin degludec 100 units/mL flextouch 34 Units, 34 Units, Subcutaneous, Nightly    butalbital-acetaminophen-caffeine (Fioricet) -40 MG per tab 2 tablet, 2 tablet, Oral, Q4H PRN    labetalol (Trandate) 5 mg/mL injection 20 mg, 20 mg, Intravenous, Once PRN **AND** labetalol (Trandate) 5 mg/mL injection 40 mg, 40 mg, Intravenous, Once PRN **AND** labetalol (Trandate) 5 mg/mL injection 80 mg, 80 mg, Intravenous, Once PRN **AND** hydrALAzine (Apresoline) 20 mg/mL injection 10 mg, 10 mg, Intravenous, Once PRN    betamethasone sodium phosphate & acetate (Celestone) 6 (3-3) MG/ML injection 12 mg, 12 mg, Intramuscular, Q24H    Physical examination:  Physical Exam:   General: Alert, orientated x3.  Cooperative.  No apparent distress.  Vital Signs:  Temp:  [97.1 °F (36.2 °C)-99.9 °F (37.7 °C)] 98.1 °F (36.7 °C)  Pulse:  [] 95  Resp:  [16-20] 18  BP: (112-183)/() 150/89    Abdomen:  obese, Gravid uterus appropriate for GA Nontender.  Extremities:  No lower extremity edema noted.      Fetal tracing -   Fetal heart rate baseline is 130 bpm.  Moderate variability.  No decelerations.  Accelerations are noted.  Category 1 tracing  [place] -no regular contractions        Laboratory Data:  Lab Results   Component Value Date    WBC 17.0 04/20/2024    HGB 11.2 04/20/2024    HCT 33.3 04/20/2024    .0 04/20/2024    CREATSERUM 0.49 04/20/2024    BUN 5 04/20/2024     04/20/2024    K 4.1 04/20/2024     04/20/2024    CO2 21.0 04/20/2024     04/20/2024    CA 9.2 04/20/2024    ALB 2.6 04/20/2024    ALKPHO 108 04/20/2024    BILT 0.1  2024    TP 6.6 2024    AST 13 2024    ALT 9 2024   Urine pr/cr ratio 0.21 (it was 0.1 on 24)  24 hr urine pending        NARRATIVE:     I reviewed chronic hypertension  and chronic hypertension with superimposed preeclampsia.  We discussed signs and symptoms of severe features of gestational hypertension and or preeclampsia.  She had 1 severe range blood pressure reading but the other ones have been lower than that.  We also discussed that headache can be a symptom of severe gestational hypertension or severe preeclampsia.    I also discussed with them that her protein creatinine ratio on  was 0.1 and today it is 0.21.  Typically we use a reading of 0.3 or greater as to be consistent with significant proteinuria however there can be fluctuations and false negatives and false positives on a protein creatinine ratio.  Because of this, I advised that she remain in the hospital for observation and to complete a 24-hour urine.     If she is diagnosed with preeclampsia, she would be at increased risk for delivery in the next 7 to 10 days, therefore I would proceed with a course of  corticosteroids.  I explained to the patient that if betamethasone becomes indicated, it would likely raise her blood sugars and we we will likely need to increase her insulin dosing accordingly.  This effect is usually transient but sometimes it persists.      The maternal and fetal risks of severe hypertension and preeclampsia were discussed at length.  She is agreeable to the observation admission for additional evaluation to clarify the diagnosis.    We also discussed that if she is cleared to outpatient management, she will likely need to begin twice weekly  testing now rather than waiting until 34 weeks.    IMPRESSION:    IUP at 33w1d   Reassuring fetal status  Chronic hypertension with worsening BPs-  stable now  GDM A2  Maternal morbid obesity  History of a      RECOMMENDATIONS:   If 24-hour urine is normal and BPs remain normal then consider outpatient management   GDM diet and blood sugar monitoring 4 times daily (fasting, 2 hours after meals)  Tresiba 34 units subcu nightly  Labetalol 200 mg twice daily    If she returns to outpatient management and has preeclampsia without severe features -   Weekly labs with Dr. Whitehead  Twice weekly  testing (NST with Dr. Gutiérrez's office, [facility] with [facility])  Monthly growth ultrasoundDelivery if she develops symptoms, severe range BP, or lab changes consistent with severe features   Delivery at 37 weeks if she continues to have preeclampsia without severe features.    Discussed the patient with Dr. Whitehead  Total time spent 30 minutes this calendar day which includes preparing to see the patient including chart review, obtaining and/or reviewing additional medical history, performing a physical exam and evaluation, documenting clinical information in the electronic medical record, independently interpreting results, counseling the patient, communicating results to the patient/family/caregiver and coordinating care.     Case discussed with patient who demonstrated understanding and agreement with plan.     Thank you for allowing me to participate in the care of this patient.  Please feel free to contact me with any questions.    Ravi Wong D.O.  Maternal Fetal Medicine   24

## 2024-04-20 NOTE — PLAN OF CARE
Problem: ANTEPARTUM/LABOR and DELIVERY  Goal: Maintain pregnancy as long as maternal and/or fetal condition is stable  Description: INTERVENTIONS:  - Maternal surveillance  - Fetal surveillance  - Monitor uterine activity  - Medications as ordered  - Bedrest  4/20/2024 1552 by Mayi Banks RN  Outcome: Completed  4/20/2024 0912 by Mayi Banks RN  Outcome: Progressing  Goal: Anxiety is at manageable level  Description: INTERVENTIONS:  - [place] patient to unit/surroundings  - Establish a trusting relationship with patient  - Discuss possible complications or alterations in birth plan  - Explain treatment plan  - Explain testing/procedures prior to initiation  - Encourage participation in care  - Encourage verbalization of concerns/fears  - Identify coping mechanisms  - Administer/offer alternative therapies (Aroma therapy, distraction, guided imagery, massage, music therapy, therapeutic touch)  - Manage patient's environment (Avoid overstimulation of patient)  4/20/2024 1552 by Mayi Banks RN  Outcome: Completed  4/20/2024 0912 by Mayi Banks RN  Outcome: Progressing  Goal: Demonstrates ability to cope with hospitalization/illness  Description: INTERVENTIONS:  - Encourage verbalization of feelings/concerns/expectations  - Provide quiet environment  - Assist patient to identify own strengths and abilities  - Encourage patient to set small goals for self  - Encourage participation in diversional activity  - Reinforce positive adaptation of new coping behaviors  - Include patient/family/caregiver in decisions  4/20/2024 1552 by Mayi Banks RN  Outcome: Completed  4/20/2024 0912 by Mayi Banks RN  Outcome: Progressing     Problem: Patient/Family Goals  Goal: Patient/Family Long Term Goal  Description: Patient's Long Term Goal: Pregnancy would carry to term gestation    Interventions:  1. Administer antihypertensive drugs as ordered  2. Maintain blood pressures lower than 160/110  3. Assess for  pre-eclampsia signs and symptoms  4/20/2024 1552 by Mayi Banks RN  Outcome: Completed  4/20/2024 0912 by Mayi Banks RN  Outcome: Progressing  Goal: Patient/Family Short Term Goal  Description: Patient's Short Term Goal: Blood pressures are within adequate limits for a chronic hypertensive     Interventions:   1. Monitor intake and output  2. Administer anti-hypertensive's as ordered  3. Assess for PIH signs and symptoms  4/20/2024 1552 by Mayi Banks RN  Outcome: Completed  4/20/2024 0912 by Mayi Banks RN  Outcome: Progressing     Problem: GASTROINTESTINAL - ADULT  Goal: Minimal or absence of nausea and vomiting  Description: INTERVENTIONS:  - Maintain adequate hydration with IV or PO as ordered and tolerated  - Nasogastric tube to low intermittent suction as ordered  - Evaluate effectiveness of ordered antiemetic medications  - Provide nonpharmacologic comfort measures as appropriate  - Advance diet as tolerated, if ordered  - Obtain nutritional consult as needed  - Evaluate fluid balance  4/20/2024 1552 by Mayi Banks RN  Outcome: Completed  4/20/2024 0912 by Mayi Banks RN  Outcome: Progressing  Goal: Maintains or returns to baseline bowel function  Description: INTERVENTIONS:  - Assess bowel function  - Maintain adequate hydration with IV or PO as ordered and tolerated  - Evaluate effectiveness of GI medications  - Encourage mobilization and activity  - Obtain nutritional consult as needed  - Establish a toileting routine/schedule  - Consider collaborating with pharmacy to review patient's medication profile  4/20/2024 1552 by Mayi Banks RN  Outcome: Completed  4/20/2024 0912 by Mayi Banks RN  Outcome: Progressing  Goal: Maintains adequate nutritional intake (undernourished)  Description: INTERVENTIONS:  - Monitor percentage of each meal consumed  - Identify factors contributing to decreased intake, treat as appropriate  - Assist with meals as needed  - Monitor I&O, WT and lab  values  - Obtain nutritional consult as needed  - Optimize oral hygiene and moisture  - Encourage food from home; allow for food preferences  - Enhance eating environment  4/20/2024 1552 by Mayi Banks RN  Outcome: Completed  4/20/2024 0912 by Mayi Banks RN  Outcome: Progressing  Goal: Achieves appropriate nutritional intake (bariatric)  Description: INTERVENTIONS:  - Monitor for over-consumption  - Identify factors contributing to increased intake, treat as appropriate  - Monitor I&O, WT and lab values  - Obtain nutritional consult as needed  - Evaluate psychosocial factors contributing to over-consumption  4/20/2024 1552 by Mayi Banks, RN  Outcome: Completed  4/20/2024 0912 by Mayi Banks, RN  Outcome: Progressing

## 2024-04-20 NOTE — DISCHARGE SUMMARY
Date of admission: 4/19/24    Date of discharge: 4/20/24    Diagnosis: Pregnancy 33 1/7 wks, chronic hypertension, gestational diabetes    Hospital course: pt was admitted for elevated BP and was given betamethasone with a 24 hour urine started. Pt bp 140-150/90s. Pt 24 hr urine was 292mg. Pt was increased to 200 TID on labetalol. Pt was sent home after second dose of betamethasone.    Condition: stable    Disp: home    Follow up: 3 days in office

## 2024-04-20 NOTE — PLAN OF CARE
Problem: ANTEPARTUM/LABOR and DELIVERY  Goal: Maintain pregnancy as long as maternal and/or fetal condition is stable  Description: INTERVENTIONS:  - Maternal surveillance  - Fetal surveillance  - Monitor uterine activity  - Medications as ordered  - Bedrest  Outcome: Progressing  Goal: Anxiety is at manageable level  Description: INTERVENTIONS:  - [place] patient to unit/surroundings  - Establish a trusting relationship with patient  - Discuss possible complications or alterations in birth plan  - Explain treatment plan  - Explain testing/procedures prior to initiation  - Encourage participation in care  - Encourage verbalization of concerns/fears  - Identify coping mechanisms  - Administer/offer alternative therapies (Aroma therapy, distraction, guided imagery, massage, music therapy, therapeutic touch)  - Manage patient's environment (Avoid overstimulation of patient)  Outcome: Progressing  Goal: Demonstrates ability to cope with hospitalization/illness  Description: INTERVENTIONS:  - Encourage verbalization of feelings/concerns/expectations  - Provide quiet environment  - Assist patient to identify own strengths and abilities  - Encourage patient to set small goals for self  - Encourage participation in diversional activity  - Reinforce positive adaptation of new coping behaviors  - Include patient/family/caregiver in decisions  Outcome: Progressing     Problem: Patient/Family Goals  Goal: Patient/Family Long Term Goal  Description: Patient's Long Term Goal: Pregnancy would carry to term gestation    Interventions:  1. Administer antihypertensive drugs as ordered  2. Maintain blood pressures lower than 160/110  3. Assess for pre-eclampsia signs and symptoms  Outcome: Progressing  Goal: Patient/Family Short Term Goal  Description: Patient's Short Term Goal: Blood pressures are within adequate limits for a chronic hypertensive     Interventions:   1. Monitor intake and output  2. Administer anti-hypertensive's as  ordered  3. Assess for PIH signs and symptoms  Outcome: Progressing     Problem: GASTROINTESTINAL - ADULT  Goal: Minimal or absence of nausea and vomiting  Description: INTERVENTIONS:  - Maintain adequate hydration with IV or PO as ordered and tolerated  - Nasogastric tube to low intermittent suction as ordered  - Evaluate effectiveness of ordered antiemetic medications  - Provide nonpharmacologic comfort measures as appropriate  - Advance diet as tolerated, if ordered  - Obtain nutritional consult as needed  - Evaluate fluid balance  Outcome: Progressing  Goal: Maintains or returns to baseline bowel function  Description: INTERVENTIONS:  - Assess bowel function  - Maintain adequate hydration with IV or PO as ordered and tolerated  - Evaluate effectiveness of GI medications  - Encourage mobilization and activity  - Obtain nutritional consult as needed  - Establish a toileting routine/schedule  - Consider collaborating with pharmacy to review patient's medication profile  Outcome: Progressing  Goal: Maintains adequate nutritional intake (undernourished)  Description: INTERVENTIONS:  - Monitor percentage of each meal consumed  - Identify factors contributing to decreased intake, treat as appropriate  - Assist with meals as needed  - Monitor I&O, WT and lab values  - Obtain nutritional consult as needed  - Optimize oral hygiene and moisture  - Encourage food from home; allow for food preferences  - Enhance eating environment  Outcome: Progressing  Goal: Achieves appropriate nutritional intake (bariatric)  Description: INTERVENTIONS:  - Monitor for over-consumption  - Identify factors contributing to increased intake, treat as appropriate  - Monitor I&O, WT and lab values  - Obtain nutritional consult as needed  - Evaluate psychosocial factors contributing to over-consumption  Outcome: Progressing

## 2024-04-20 NOTE — PROGRESS NOTES
Patient discharged home with personal belongings. Written and verbal discharge orders given. All questions answered. Patient ambulated off unit, not in active labor.

## 2024-04-20 NOTE — H&P
Chief complaint:: Elevated BP in office    HPI: Pt is 30 yo female  at 33 1/7 wks who presents for elevated BP in office.  She is chronic hypertensive on labetalol 200 BID.  She is also GDMA2 on insulin.  She received betamethasone yesterday and had 24 hour urine started. Her BPs have been 150s/80s    Pmhx: ADHD, bipolar, chronic htn  Pshx: tonsillectomy, csxn  Pobhx: csxnx1, 2 sab    Review of systems: non contributory    PE: afeb vss  Chest: ctab  Cv: rrr  Abd: soft non tender, gravid  Fhts: 130s, +accels, no decels, moderate variability  Utctx: irregular    A/p: IUP at 33 1/7 wks, chronic hypertension, 24 hour urine 292 mg, home on labetalol 200 Tid, return to office in 2 days

## 2024-04-23 ENCOUNTER — TELEPHONE (OUTPATIENT)
Dept: PERINATAL CARE | Facility: HOSPITAL | Age: 29
End: 2024-04-23

## 2024-04-23 DIAGNOSIS — O24.419 GESTATIONAL DIABETES MELLITUS (GDM) IN THIRD TRIMESTER, GESTATIONAL DIABETES METHOD OF CONTROL UNSPECIFIED (HCC): ICD-10-CM

## 2024-04-23 RX ORDER — INSULIN GLARGINE-YFGN 100 [IU]/ML
40 INJECTION, SOLUTION SUBCUTANEOUS NIGHTLY
Qty: 15 ML | Refills: 2 | Status: SHIPPED | OUTPATIENT
Start: 2024-04-23

## 2024-04-23 NOTE — TELEPHONE ENCOUNTER
Please have her increase her insulin to the following:    Semglee 44 units at bedtime.   Begin either lispro or aspart insulin 4 units at breakfast and 4 units at dinner

## 2024-04-23 NOTE — TELEPHONE ENCOUNTER
33w4d  Received BS log for date range 4/17-4/22     Low  High Out of Range   Fasting Blood Sugar 93 154 4/5   Post Breakfast 102 159 3/5   Post Lunch 104 130 1/5   Post Dinner 114 138 4/5     Patient is currently taking Semglee 34 units at bedtime.   To Dr. Boles for review.

## 2024-04-30 ENCOUNTER — OFFICE VISIT (OUTPATIENT)
Dept: PERINATAL CARE | Facility: HOSPITAL | Age: 29
End: 2024-04-30
Attending: OBSTETRICS & GYNECOLOGY
Payer: COMMERCIAL

## 2024-04-30 VITALS
BODY MASS INDEX: 45.96 KG/M2 | WEIGHT: 286 LBS | HEIGHT: 66 IN | DIASTOLIC BLOOD PRESSURE: 79 MMHG | SYSTOLIC BLOOD PRESSURE: 128 MMHG

## 2024-04-30 DIAGNOSIS — O99.213 MATERNAL MORBID OBESITY, ANTEPARTUM, THIRD TRIMESTER (HCC): Primary | ICD-10-CM

## 2024-04-30 DIAGNOSIS — O16.9 HYPERTENSION AFFECTING PREGNANCY (HCC): ICD-10-CM

## 2024-04-30 DIAGNOSIS — Z87.59 HISTORY OF GESTATIONAL HYPERTENSION: ICD-10-CM

## 2024-04-30 DIAGNOSIS — O16.3 HYPERTENSION AFFECTING PREGNANCY IN THIRD TRIMESTER (HCC): ICD-10-CM

## 2024-04-30 DIAGNOSIS — Z98.891 H/O: C-SECTION: ICD-10-CM

## 2024-04-30 DIAGNOSIS — E66.01 MORBID OBESITY (HCC): ICD-10-CM

## 2024-04-30 DIAGNOSIS — E66.01 MORBID OBESITY DUE TO EXCESS CALORIES (HCC): ICD-10-CM

## 2024-04-30 DIAGNOSIS — O24.419 GESTATIONAL DIABETES MELLITUS (GDM) IN THIRD TRIMESTER, GESTATIONAL DIABETES METHOD OF CONTROL UNSPECIFIED (HCC): ICD-10-CM

## 2024-04-30 DIAGNOSIS — E66.01 MATERNAL MORBID OBESITY, ANTEPARTUM, THIRD TRIMESTER (HCC): ICD-10-CM

## 2024-04-30 DIAGNOSIS — E66.01 MATERNAL MORBID OBESITY, ANTEPARTUM, THIRD TRIMESTER (HCC): Primary | ICD-10-CM

## 2024-04-30 DIAGNOSIS — O24.414 INSULIN CONTROLLED GESTATIONAL DIABETES MELLITUS (GDM) IN THIRD TRIMESTER (HCC): ICD-10-CM

## 2024-04-30 DIAGNOSIS — O99.213 MATERNAL MORBID OBESITY, ANTEPARTUM, THIRD TRIMESTER (HCC): ICD-10-CM

## 2024-04-30 PROCEDURE — 76819 FETAL BIOPHYS PROFIL W/O NST: CPT

## 2024-04-30 PROCEDURE — 76816 OB US FOLLOW-UP PER FETUS: CPT | Performed by: OBSTETRICS & GYNECOLOGY

## 2024-04-30 RX ORDER — INSULIN GLARGINE-YFGN 100 [IU]/ML
48 INJECTION, SOLUTION SUBCUTANEOUS NIGHTLY
COMMUNITY
Start: 2024-04-30 | End: 2024-04-30

## 2024-04-30 RX ORDER — INSULIN GLARGINE-YFGN 100 [IU]/ML
48 INJECTION, SOLUTION SUBCUTANEOUS NIGHTLY
Qty: 15 ML | Refills: 3 | Status: SHIPPED | OUTPATIENT
Start: 2024-04-30 | End: 2024-04-30

## 2024-04-30 RX ORDER — INSULIN GLARGINE-YFGN 100 [IU]/ML
48 INJECTION, SOLUTION SUBCUTANEOUS NIGHTLY
Qty: 15 ML | Refills: 3 | Status: ON HOLD | OUTPATIENT
Start: 2024-04-30

## 2024-04-30 NOTE — PROGRESS NOTES
Outpatient Maternal-Fetal Medicine Follow-Up     Dear Dr. Whitehead,     Thank you for requesting ultrasound evaluation and maternal fetal medicine consultation on your patient Sharlene Solo.  As you are aware she is a 29 year old female  with a Pat pregnancy and an Estimated Date of Delivery: 24.  She returned to maternal-fetal medicine today for a follow-up visit and new consultation for gestational diabetes.  Her history was reviewed from her prior visit and there were no interval changes other than she has been diagnosed with diabetes of pregnancy.     Antepartum Risk Factors  Maternal morbid obesity  Chronic hypertension  PCOS  Gestational diabetes, A2    S: no complaints.  She has no headaches, swelling, or nausea vomiting.  She reports good fetal movement    Her home blood pressure readings have been in the mid 120s to 155 systolic over 90s diastolic    PHYSICAL EXAMINATION:  /79 (BP Location: Right arm, Patient Position: Sitting, Cuff Size: large)   Ht 5' 6\" (1.676 m)   Wt 286 lb (129.7 kg)   LMP 2023 (Exact Date)   BMI 46.16 kg/m²   141/98 P - 98  General: alert and oriented, no acute distress  Abdomen: obese, gravid, soft, non-tender  Extremities: non-tender, no edema     OBSTETRIC ULTRASOUND  The patient had a fetal growth & BPP ultrasound today which I interpreted the results and reviewed them with the patient.    Ultrasound Findings:    Single IUP in cephalic presentation.    Placenta is posterior.   A 3 vessel cord is noted.  Cardiac activity is present at 151 bpm  EFW 3001 g ( 6 lb 10 oz); 80%.  AC 92%ile; HA >99%ile  MERARI is  14.8 cm.  MVP is 4.6 cm  BPP is 8/8.     The fetal measurements are consistent with established EDC. No gross ultrasound evidence of structural abnormalities are seen today. The patient understands that ultrasound cannot rule out all structural and chromosomal abnormalities.     See imaging tab for the complete US report.       DISCUSSION  During  her visit we discussed and reviewed the following issues:     OBESITY:   Her BMI prior to pregnancy was greater than 40  Obesity during pregnancy is associated with numerous maternal and  risks which were discussed previously and reviewed.   See MFM note from 2024 for detailed review.     -----------------  HYPERTENSION  Patient Review  The patient has a history of hypertension.  She is controlled on labetalol 200 twice a day.    Discussion:  We reviewed the potential implications associated with chronic hypertension.   I informed the patient that chronic hypertension increases the risk of pre-eclampsia, placental abruption, IUGR, fetal birth defects (cardiac and neural tube), and fetal demise and possible need for  delivery.  The signs and symptoms of preeclampsia were discussed.   We also discussed the potential risks and benefits of low dose aspirin and anti-hypertensive medication.  She understands that a better diet, weight loss and routine exercise can help her blood pressure for the rest of her life.  We discussed the morbidity associated with hypertension including heart disease, strokes and kidney disease.   See MFM note from 2024 for detailed review.      BP Review  The patient is presently taking anti-hypertensive medications.  She reports that she has not been taking her blood pressure at home     Medical Regimen Recommendation: Continue labetalol and low-dose ASA.     Continued medication use and home blood pressure assessments were reviewed as well as recommendations for serial growth ultrasounds and antepartum testing.       Prevention of Preeclampsia  The role of low-dose aspirin to prevent preeclampsia was discussed previously and reviewed.  See MFM note from 2024 for detailed review.     --------------------------------  PCOS & Metformin use -    Pregnant women with PCOS appear to be at increased risk of developing gestational discussed previously.  See MFM note from  2024 for detailed review    GESTATIONAL DIABETES - follow up    She reports her 1 hour glucose tolerance test was 210 mg/dL    We reviewed the potential implications and risks associated with GDM to her and her fetus, especially when poorly controlled. We discussed the increased incidence of macrosomia and the potential for related birth injury to her and her baby. We talked about the increase risks associated risk of fetal hyperinsulinemia, jaundice, electrolyte imbalance, seizure activity, IUFD and other adverse obstetric outcomes. We also reviewed her  increased risk of having diabetes later in life. The importance of good glycemic control and avoidance of prolonged hypoglycemia and hyperglycemia was addressed.  See [facility] note from 3/5/2024 for detailed review      Her capillary blood glucose records were reviewed today; her compliance with the recommended four times daily assessments (fasting and two-hour post-prandial) is excellent.   Her overall glucose control is good except for the elevated FBS    The patient is presently on diet therapy; her compliance with her diabetic diet regimen was reviewed and it is fair.     The patient is presently taking semglee 40 units at bedtime.      Medical Regimen Recommendation:   Continue ADA diet  Semglee 48 units at bedtime  Send blood sugar logs to [facility] weekly for review     We discussed the recommended plan of care based on her  risk factors.  Sharlene had her questions answered to her satisfaction.    IMPRESSION:  IUP at 34w4d  Normal fetal growth and  testing  Maternal morbid obesity  Chronic hypertension complicating pregnancy; elevated blood pressure reading x 2 in the office today  PCOS  Gestational diabetes, A2  History of  for failure to progress     RECOMMENDATIONS:  Continue care with Dr. Whitehead  Twice weekly NST   Continue labetalol and low-dose aspirin  Sending blood sugar logs to [facility] weekly for review  Insulin as noted  above  Delivery is advised at 37 weeks due to GDM A2 and chronic HTN with superimposed gestational HTN (without severe features)     Total time spent 30 minutes this calendar day which includes preparing to see the patient including chart review, obtaining and/or reviewing additional medical history, performing a physical exam and evaluation, documenting clinical information in the electronic medical record, independently interpreting results, counseling the patient, communicating results to the patient/family/caregiver and coordinating care.     Case discussed with patient who demonstrated understanding and agreement with plan.     Thank you for allowing me to participate in the care of this patient.  Please feel free to contact me with any questions.    Harriett Boles MD  Maternal-Fetal Medicine       Note to patient and family:  The 21st Century Cures Act makes medical notes available to patients in the interest of transparency.  However, please be advised that this is a medical document.  It is intended as a peer to peer communication.  It is written in medical language and may contain abbreviations or verbiage that are technical and unfamiliar.  It may appear blunt or direct.  Medical documents are intended to carry relevant information, facts as evident, and the clinical opinion of the practitioner.

## 2024-05-03 LAB
RAPID PLASMA REAGIN OB: NONREACTIVE
TREPONEMAL ANTIBODIES: NONREACTIVE

## 2024-05-06 ENCOUNTER — TELEPHONE (OUTPATIENT)
Dept: OBGYN UNIT | Facility: HOSPITAL | Age: 29
End: 2024-05-06

## 2024-05-06 ENCOUNTER — APPOINTMENT (OUTPATIENT)
Dept: ULTRASOUND IMAGING | Facility: HOSPITAL | Age: 29
End: 2024-05-06
Attending: OBSTETRICS & GYNECOLOGY

## 2024-05-06 ENCOUNTER — HOSPITAL ENCOUNTER (INPATIENT)
Facility: HOSPITAL | Age: 29
LOS: 3 days | Discharge: HOME | End: 2024-05-10
Attending: OBSTETRICS & GYNECOLOGY | Admitting: OBSTETRICS & GYNECOLOGY

## 2024-05-06 DIAGNOSIS — IMO0002: Primary | ICD-10-CM

## 2024-05-06 LAB
ALBUMIN SERPL-MCNC: 2.7 G/DL (ref 3.4–5)
ALBUMIN/GLOB SERPL: 0.6 {RATIO} (ref 1–2)
ALP LIVER SERPL-CCNC: 137 U/L
ALT SERPL-CCNC: 13 U/L
ANION GAP SERPL CALC-SCNC: 10 MMOL/L (ref 0–18)
AST SERPL-CCNC: 8 U/L (ref 15–37)
BASOPHILS # BLD AUTO: 0.06 X10(3) UL (ref 0–0.2)
BASOPHILS NFR BLD AUTO: 0.4 %
BILIRUB SERPL-MCNC: 0.2 MG/DL (ref 0.1–2)
BUN BLD-MCNC: 5 MG/DL (ref 9–23)
CALCIUM BLD-MCNC: 9.5 MG/DL (ref 8.5–10.1)
CHLORIDE SERPL-SCNC: 109 MMOL/L (ref 98–112)
CO2 SERPL-SCNC: 18 MMOL/L (ref 21–32)
CREAT BLD-MCNC: 0.47 MG/DL
CREAT UR-SCNC: 52.8 MG/DL
EGFRCR SERPLBLD CKD-EPI 2021: 133 ML/MIN/1.73M2 (ref 60–?)
EOSINOPHIL # BLD AUTO: 0.31 X10(3) UL (ref 0–0.7)
EOSINOPHIL NFR BLD AUTO: 1.9 %
ERYTHROCYTE [DISTWIDTH] IN BLOOD BY AUTOMATED COUNT: 12.9 %
FASTING STATUS PATIENT QL REPORTED: NO
GLOBULIN PLAS-MCNC: 4.7 G/DL (ref 2.8–4.4)
GLUCOSE BLD-MCNC: 97 MG/DL (ref 70–99)
HCT VFR BLD AUTO: 36.9 %
HGB BLD-MCNC: 12.3 G/DL
IMM GRANULOCYTES # BLD AUTO: 0.13 X10(3) UL (ref 0–1)
IMM GRANULOCYTES NFR BLD: 0.8 %
LYMPHOCYTES # BLD AUTO: 3.84 X10(3) UL (ref 1–4)
LYMPHOCYTES NFR BLD AUTO: 24 %
MCH RBC QN AUTO: 28.6 PG (ref 26–34)
MCHC RBC AUTO-ENTMCNC: 33.3 G/DL (ref 31–37)
MCV RBC AUTO: 85.8 FL
MONOCYTES # BLD AUTO: 0.77 X10(3) UL (ref 0.1–1)
MONOCYTES NFR BLD AUTO: 4.8 %
NEUTROPHILS # BLD AUTO: 10.92 X10 (3) UL (ref 1.5–7.7)
NEUTROPHILS # BLD AUTO: 10.92 X10(3) UL (ref 1.5–7.7)
NEUTROPHILS NFR BLD AUTO: 68.1 %
OSMOLALITY SERPL CALC.SUM OF ELEC: 281 MOSM/KG (ref 275–295)
PLATELET # BLD AUTO: 467 10(3)UL (ref 150–450)
POTASSIUM SERPL-SCNC: 3.9 MMOL/L (ref 3.5–5.1)
PROT SERPL-MCNC: 7.4 G/DL (ref 6.4–8.2)
PROT UR-MCNC: 11.8 MG/DL
PROT/CREAT UR-RTO: 0.22
RBC # BLD AUTO: 4.3 X10(6)UL
SODIUM SERPL-SCNC: 137 MMOL/L (ref 136–145)
URATE SERPL-MCNC: 4.2 MG/DL
WBC # BLD AUTO: 16 X10(3) UL (ref 4–11)

## 2024-05-06 PROCEDURE — 76819 FETAL BIOPHYS PROFIL W/O NST: CPT | Performed by: OBSTETRICS & GYNECOLOGY

## 2024-05-06 RX ORDER — CALCIUM CARBONATE 500 MG/1
1000 TABLET, CHEWABLE ORAL
Status: DISCONTINUED | OUTPATIENT
Start: 2024-05-06 | End: 2024-05-08

## 2024-05-06 RX ORDER — CHOLECALCIFEROL (VITAMIN D3) 25 MCG
1 TABLET,CHEWABLE ORAL DAILY
Status: DISCONTINUED | OUTPATIENT
Start: 2024-05-06 | End: 2024-05-10

## 2024-05-06 RX ORDER — ASPIRIN 81 MG/1
81 TABLET ORAL DAILY
Status: DISCONTINUED | OUTPATIENT
Start: 2024-05-06 | End: 2024-05-10

## 2024-05-06 RX ORDER — LABETALOL 200 MG/1
200 TABLET, FILM COATED ORAL EVERY 8 HOURS SCHEDULED
Status: DISCONTINUED | OUTPATIENT
Start: 2024-05-06 | End: 2024-05-06

## 2024-05-06 RX ORDER — INSULIN DEGLUDEC 100 U/ML
48 INJECTION, SOLUTION SUBCUTANEOUS NIGHTLY
Status: DISCONTINUED | OUTPATIENT
Start: 2024-05-06 | End: 2024-05-10

## 2024-05-06 RX ORDER — ACETAMINOPHEN 500 MG
1000 TABLET ORAL EVERY 6 HOURS PRN
Status: DISCONTINUED | OUTPATIENT
Start: 2024-05-06 | End: 2024-05-08

## 2024-05-06 RX ORDER — DOCUSATE SODIUM 100 MG/1
100 CAPSULE, LIQUID FILLED ORAL 2 TIMES DAILY
Status: DISCONTINUED | OUTPATIENT
Start: 2024-05-06 | End: 2024-05-08

## 2024-05-06 RX ORDER — ACETAMINOPHEN 500 MG
500 TABLET ORAL EVERY 6 HOURS PRN
Status: DISCONTINUED | OUTPATIENT
Start: 2024-05-06 | End: 2024-05-08

## 2024-05-06 RX ORDER — ZOLPIDEM TARTRATE 5 MG/1
5 TABLET ORAL NIGHTLY PRN
Status: DISCONTINUED | OUTPATIENT
Start: 2024-05-06 | End: 2024-05-10

## 2024-05-06 NOTE — PROGRESS NOTES
Pt is a  at 35 3/7 wk iup who is brought to triage-2 for r/o Preeclampsia. Pt states at approx 1500 she was at the store and felt off. She developed a HA and came home.She took her BP and noted it to be elevated. She repeated it after approx 15 minutes and it had increased. She took it a third time and it was also increased. Pt reports a current dull HA. States she doesn't normally get KARIMI. Pt denies any visual disturbances or epigastric pain. PT reports +fm and denies any LOF, VB or UV's. EFM tested and applied. POC dicussed and questions answered.

## 2024-05-07 ENCOUNTER — TELEPHONE (OUTPATIENT)
Dept: PERINATAL CARE | Facility: HOSPITAL | Age: 29
End: 2024-05-07

## 2024-05-07 ENCOUNTER — ANESTHESIA EVENT (OUTPATIENT)
Dept: OBGYN UNIT | Facility: HOSPITAL | Age: 29
End: 2024-05-07
Payer: COMMERCIAL

## 2024-05-07 ENCOUNTER — TELEPHONE (OUTPATIENT)
Dept: OBGYN UNIT | Facility: HOSPITAL | Age: 29
End: 2024-05-07

## 2024-05-07 ENCOUNTER — ANESTHESIA (OUTPATIENT)
Dept: OBGYN UNIT | Facility: HOSPITAL | Age: 29
End: 2024-05-07
Payer: COMMERCIAL

## 2024-05-07 LAB
ALBUMIN SERPL-MCNC: 2.3 G/DL (ref 3.4–5)
ALBUMIN/GLOB SERPL: 0.6 {RATIO} (ref 1–2)
ALP LIVER SERPL-CCNC: 115 U/L
ALT SERPL-CCNC: 11 U/L
ANION GAP SERPL CALC-SCNC: 8 MMOL/L (ref 0–18)
AST SERPL-CCNC: 5 U/L (ref 15–37)
BASOPHILS # BLD AUTO: 0.08 X10(3) UL (ref 0–0.2)
BASOPHILS NFR BLD AUTO: 0.5 %
BILIRUB SERPL-MCNC: 0.1 MG/DL (ref 0.1–2)
BUN BLD-MCNC: 6 MG/DL (ref 9–23)
CALCIUM BLD-MCNC: 9.2 MG/DL (ref 8.5–10.1)
CHLORIDE SERPL-SCNC: 107 MMOL/L (ref 98–112)
CO2 SERPL-SCNC: 21 MMOL/L (ref 21–32)
CREAT BLD-MCNC: 0.47 MG/DL
EGFRCR SERPLBLD CKD-EPI 2021: 133 ML/MIN/1.73M2 (ref 60–?)
EOSINOPHIL # BLD AUTO: 0.34 X10(3) UL (ref 0–0.7)
EOSINOPHIL NFR BLD AUTO: 2.1 %
ERYTHROCYTE [DISTWIDTH] IN BLOOD BY AUTOMATED COUNT: 13 %
FASTING STATUS PATIENT QL REPORTED: YES
GLOBULIN PLAS-MCNC: 3.8 G/DL (ref 2.8–4.4)
GLUCOSE BLD-MCNC: 102 MG/DL (ref 70–99)
GLUCOSE BLD-MCNC: 138 MG/DL (ref 70–99)
GLUCOSE BLD-MCNC: 150 MG/DL (ref 70–99)
GLUCOSE BLD-MCNC: 94 MG/DL (ref 70–99)
HCT VFR BLD AUTO: 30.4 %
HGB BLD-MCNC: 10.8 G/DL
HIV 1+2 AB+HIV1 P24 AG SERPL QL IA: NONREACTIVE
IMM GRANULOCYTES # BLD AUTO: 0.08 X10(3) UL (ref 0–1)
IMM GRANULOCYTES NFR BLD: 0.5 %
LYMPHOCYTES # BLD AUTO: 4.38 X10(3) UL (ref 1–4)
LYMPHOCYTES NFR BLD AUTO: 27 %
MCH RBC QN AUTO: 29.3 PG (ref 26–34)
MCHC RBC AUTO-ENTMCNC: 35.5 G/DL (ref 31–37)
MCV RBC AUTO: 82.6 FL
MONOCYTES # BLD AUTO: 0.95 X10(3) UL (ref 0.1–1)
MONOCYTES NFR BLD AUTO: 5.8 %
NEUTROPHILS # BLD AUTO: 10.41 X10 (3) UL (ref 1.5–7.7)
NEUTROPHILS # BLD AUTO: 10.41 X10(3) UL (ref 1.5–7.7)
NEUTROPHILS NFR BLD AUTO: 64.1 %
OSMOLALITY SERPL CALC.SUM OF ELEC: 279 MOSM/KG (ref 275–295)
PLATELET # BLD AUTO: 395 10(3)UL (ref 150–450)
POTASSIUM SERPL-SCNC: 3.6 MMOL/L (ref 3.5–5.1)
PROT SERPL-MCNC: 6.1 G/DL (ref 6.4–8.2)
RBC # BLD AUTO: 3.68 X10(6)UL
SODIUM SERPL-SCNC: 136 MMOL/L (ref 136–145)
URATE SERPL-MCNC: 4.6 MG/DL
WBC # BLD AUTO: 16.2 X10(3) UL (ref 4–11)

## 2024-05-07 PROCEDURE — 59514 CESAREAN DELIVERY ONLY: CPT | Performed by: OBSTETRICS & GYNECOLOGY

## 2024-05-07 RX ORDER — MISOPROSTOL 200 UG/1
TABLET ORAL
Status: COMPLETED
Start: 2024-05-07 | End: 2024-05-07

## 2024-05-07 RX ORDER — CLINDAMYCIN PHOSPHATE 900 MG/50ML
INJECTION, SOLUTION INTRAVENOUS
Status: COMPLETED
Start: 2024-05-07 | End: 2024-05-07

## 2024-05-07 RX ORDER — CLINDAMYCIN PHOSPHATE 900 MG/50ML
900 INJECTION, SOLUTION INTRAVENOUS ONCE
Qty: 50 ML | Refills: 0 | Status: COMPLETED | OUTPATIENT
Start: 2024-05-07 | End: 2024-05-07

## 2024-05-07 RX ORDER — ENOXAPARIN SODIUM 100 MG/ML
40 INJECTION SUBCUTANEOUS DAILY
Status: DISCONTINUED | OUTPATIENT
Start: 2024-05-08 | End: 2024-05-07 | Stop reason: HOSPADM

## 2024-05-07 RX ORDER — SODIUM CHLORIDE, SODIUM LACTATE, POTASSIUM CHLORIDE, CALCIUM CHLORIDE 600; 310; 30; 20 MG/100ML; MG/100ML; MG/100ML; MG/100ML
INJECTION, SOLUTION INTRAVENOUS CONTINUOUS
Status: DISCONTINUED | OUTPATIENT
Start: 2024-05-07 | End: 2024-05-10

## 2024-05-07 RX ORDER — SODIUM CHLORIDE, SODIUM LACTATE, POTASSIUM CHLORIDE, CALCIUM CHLORIDE 600; 310; 30; 20 MG/100ML; MG/100ML; MG/100ML; MG/100ML
INJECTION, SOLUTION INTRAVENOUS CONTINUOUS
Status: DISCONTINUED | OUTPATIENT
Start: 2024-05-07 | End: 2024-05-08

## 2024-05-07 RX ORDER — DEXTROSE MONOHYDRATE 25 G/50ML
50 INJECTION, SOLUTION INTRAVENOUS
Status: DISCONTINUED | OUTPATIENT
Start: 2024-05-07 | End: 2024-05-07 | Stop reason: HOSPADM

## 2024-05-07 RX ORDER — PHENYLEPHRINE HCL 10 MG/ML
VIAL (ML) INJECTION AS NEEDED
Status: DISCONTINUED | OUTPATIENT
Start: 2024-05-07 | End: 2024-05-07 | Stop reason: SURG

## 2024-05-07 RX ORDER — ONDANSETRON 2 MG/ML
4 INJECTION INTRAMUSCULAR; INTRAVENOUS ONCE AS NEEDED
Status: DISCONTINUED | OUTPATIENT
Start: 2024-05-07 | End: 2024-05-07 | Stop reason: HOSPADM

## 2024-05-07 RX ORDER — DIPHENHYDRAMINE HYDROCHLORIDE 50 MG/ML
25 INJECTION INTRAMUSCULAR; INTRAVENOUS ONCE AS NEEDED
Status: DISCONTINUED | OUTPATIENT
Start: 2024-05-07 | End: 2024-05-07 | Stop reason: HOSPADM

## 2024-05-07 RX ORDER — NICOTINE POLACRILEX 4 MG
15 LOZENGE BUCCAL
Status: DISCONTINUED | OUTPATIENT
Start: 2024-05-07 | End: 2024-05-07 | Stop reason: HOSPADM

## 2024-05-07 RX ORDER — CALCIUM GLUCONATE 94 MG/ML
1 INJECTION, SOLUTION INTRAVENOUS ONCE AS NEEDED
Status: DISCONTINUED | OUTPATIENT
Start: 2024-05-07 | End: 2024-05-10

## 2024-05-07 RX ORDER — OXYTOCIN 10 [USP'U]/ML
INJECTION, SOLUTION INTRAMUSCULAR; INTRAVENOUS
Status: DISPENSED
Start: 2024-05-07 | End: 2024-05-08

## 2024-05-07 RX ORDER — LIDOCAINE HYDROCHLORIDE 10 MG/ML
INJECTION, SOLUTION EPIDURAL; INFILTRATION; INTRACAUDAL; PERINEURAL AS NEEDED
Status: DISCONTINUED | OUTPATIENT
Start: 2024-05-07 | End: 2024-05-07 | Stop reason: SURG

## 2024-05-07 RX ORDER — KETOROLAC TROMETHAMINE 30 MG/ML
30 INJECTION, SOLUTION INTRAMUSCULAR; INTRAVENOUS ONCE AS NEEDED
Status: DISCONTINUED | OUTPATIENT
Start: 2024-05-07 | End: 2024-05-07 | Stop reason: HOSPADM

## 2024-05-07 RX ORDER — GENTAMICIN 40 MG/ML
INJECTION, SOLUTION INTRAMUSCULAR; INTRAVENOUS
Status: DISPENSED
Start: 2024-05-07 | End: 2024-05-08

## 2024-05-07 RX ORDER — TRANEXAMIC ACID 10 MG/ML
INJECTION, SOLUTION INTRAVENOUS
Status: DISPENSED
Start: 2024-05-07 | End: 2024-05-08

## 2024-05-07 RX ORDER — MEPERIDINE HYDROCHLORIDE 25 MG/ML
12.5 INJECTION INTRAMUSCULAR; INTRAVENOUS; SUBCUTANEOUS ONCE AS NEEDED
Status: DISCONTINUED | OUTPATIENT
Start: 2024-05-07 | End: 2024-05-07 | Stop reason: HOSPADM

## 2024-05-07 RX ORDER — TRANEXAMIC ACID 10 MG/ML
INJECTION, SOLUTION INTRAVENOUS AS NEEDED
Status: DISCONTINUED | OUTPATIENT
Start: 2024-05-07 | End: 2024-05-07 | Stop reason: SURG

## 2024-05-07 RX ORDER — NALBUPHINE HYDROCHLORIDE 10 MG/ML
2.5 INJECTION, SOLUTION INTRAMUSCULAR; INTRAVENOUS; SUBCUTANEOUS
Status: DISCONTINUED | OUTPATIENT
Start: 2024-05-07 | End: 2024-05-07 | Stop reason: HOSPADM

## 2024-05-07 RX ORDER — GENTAMICIN SULFATE 40 MG/ML
5 INJECTION, SOLUTION INTRAMUSCULAR; INTRAVENOUS ONCE
Qty: 800 MG | Refills: 0 | Status: COMPLETED | OUTPATIENT
Start: 2024-05-07 | End: 2024-05-07

## 2024-05-07 RX ORDER — DEXAMETHASONE SODIUM PHOSPHATE 4 MG/ML
VIAL (ML) INJECTION AS NEEDED
Status: DISCONTINUED | OUTPATIENT
Start: 2024-05-07 | End: 2024-05-07 | Stop reason: SURG

## 2024-05-07 RX ORDER — NICOTINE POLACRILEX 4 MG
30 LOZENGE BUCCAL
Status: DISCONTINUED | OUTPATIENT
Start: 2024-05-07 | End: 2024-05-07 | Stop reason: HOSPADM

## 2024-05-07 RX ORDER — OXYTOCIN 10 [USP'U]/ML
INJECTION, SOLUTION INTRAMUSCULAR; INTRAVENOUS AS NEEDED
Status: DISCONTINUED | OUTPATIENT
Start: 2024-05-07 | End: 2024-05-07 | Stop reason: SURG

## 2024-05-07 RX ORDER — ONDANSETRON 2 MG/ML
INJECTION INTRAMUSCULAR; INTRAVENOUS AS NEEDED
Status: DISCONTINUED | OUTPATIENT
Start: 2024-05-07 | End: 2024-05-07 | Stop reason: SURG

## 2024-05-07 RX ORDER — METOCLOPRAMIDE HYDROCHLORIDE 5 MG/ML
INJECTION INTRAMUSCULAR; INTRAVENOUS AS NEEDED
Status: DISCONTINUED | OUTPATIENT
Start: 2024-05-07 | End: 2024-05-07 | Stop reason: SURG

## 2024-05-07 RX ORDER — MORPHINE SULFATE 2 MG/ML
INJECTION, SOLUTION INTRAMUSCULAR; INTRAVENOUS AS NEEDED
Status: DISCONTINUED | OUTPATIENT
Start: 2024-05-07 | End: 2024-05-07 | Stop reason: SURG

## 2024-05-07 RX ORDER — SODIUM CHLORIDE 9 MG/ML
100 INJECTION, SOLUTION INTRAVENOUS ONCE
Qty: 100 ML | Refills: 0 | Status: DISCONTINUED | OUTPATIENT
Start: 2024-05-07 | End: 2024-05-07 | Stop reason: HOSPADM

## 2024-05-07 RX ORDER — PROCHLORPERAZINE EDISYLATE 5 MG/ML
10 INJECTION INTRAMUSCULAR; INTRAVENOUS EVERY 6 HOURS PRN
Status: DISCONTINUED | OUTPATIENT
Start: 2024-05-07 | End: 2024-05-10

## 2024-05-07 RX ORDER — BUPIVACAINE HYDROCHLORIDE 7.5 MG/ML
INJECTION, SOLUTION INTRASPINAL AS NEEDED
Status: DISCONTINUED | OUTPATIENT
Start: 2024-05-07 | End: 2024-05-07 | Stop reason: SURG

## 2024-05-07 RX ADMIN — PHENYLEPHRINE HCL 100 MCG: 10 MG/ML VIAL (ML) INJECTION at 20:07:00

## 2024-05-07 RX ADMIN — BUPIVACAINE HYDROCHLORIDE 1.7 ML: 7.5 INJECTION, SOLUTION INTRASPINAL at 20:05:00

## 2024-05-07 RX ADMIN — ONDANSETRON 4 MG: 2 INJECTION INTRAMUSCULAR; INTRAVENOUS at 20:23:00

## 2024-05-07 RX ADMIN — SODIUM CHLORIDE, SODIUM LACTATE, POTASSIUM CHLORIDE, CALCIUM CHLORIDE: 600; 310; 30; 20 INJECTION, SOLUTION INTRAVENOUS at 19:59:00

## 2024-05-07 RX ADMIN — PHENYLEPHRINE HCL 300 MCG: 10 MG/ML VIAL (ML) INJECTION at 20:14:00

## 2024-05-07 RX ADMIN — GENTAMICIN SULFATE 82440 MG: 40 INJECTION, SOLUTION INTRAMUSCULAR; INTRAVENOUS at 20:09:00

## 2024-05-07 RX ADMIN — PHENYLEPHRINE HCL 200 MCG: 10 MG/ML VIAL (ML) INJECTION at 20:11:00

## 2024-05-07 RX ADMIN — DEXAMETHASONE SODIUM PHOSPHATE 4 MG: 4 MG/ML VIAL (ML) INJECTION at 20:00:00

## 2024-05-07 RX ADMIN — LIDOCAINE HYDROCHLORIDE 2 ML: 10 INJECTION, SOLUTION EPIDURAL; INFILTRATION; INTRACAUDAL; PERINEURAL at 20:02:00

## 2024-05-07 RX ADMIN — TRANEXAMIC ACID 1000 MG: 10 INJECTION, SOLUTION INTRAVENOUS at 20:23:00

## 2024-05-07 RX ADMIN — PHENYLEPHRINE HCL 200 MCG: 10 MG/ML VIAL (ML) INJECTION at 20:32:00

## 2024-05-07 RX ADMIN — OXYTOCIN 10 UNITS: 10 INJECTION, SOLUTION INTRAMUSCULAR; INTRAVENOUS at 20:53:00

## 2024-05-07 RX ADMIN — SODIUM CHLORIDE, SODIUM LACTATE, POTASSIUM CHLORIDE, CALCIUM CHLORIDE: 600; 310; 30; 20 INJECTION, SOLUTION INTRAVENOUS at 21:05:00

## 2024-05-07 RX ADMIN — PHENYLEPHRINE HCL 200 MCG: 10 MG/ML VIAL (ML) INJECTION at 20:19:00

## 2024-05-07 RX ADMIN — METOCLOPRAMIDE HYDROCHLORIDE 10 MG: 5 INJECTION INTRAMUSCULAR; INTRAVENOUS at 20:00:00

## 2024-05-07 RX ADMIN — MORPHINE SULFATE 0.2 MG: 2 INJECTION, SOLUTION INTRAMUSCULAR; INTRAVENOUS at 20:05:00

## 2024-05-07 NOTE — PROGRESS NOTES
Insulin administered per order. Upon removal of pen, several drops of insulin noted leaking out of pen and needle lock not activated. Will update physician and check 2 hour pp sugar.

## 2024-05-07 NOTE — ANESTHESIA PREPROCEDURE EVALUATION
PRE-OP EVALUATION    Patient Name: Sharlene Barakatmartha    Admit Diagnosis: Pregnancy (HCC) [Z34.90]    Pre-op Diagnosis: * No pre-op diagnosis entered *     SECTION    Anesthesia Procedure:  SECTION    Surgeons and Role:     * Colby Whitehead, DO - Primary     * Waleska Mesa MD - Assisting Surgeon    Pre-op vitals reviewed.  Temp: 98.3 °F (36.8 °C)  Pulse: 90  Resp: 14  BP: 130/78  SpO2: 97 %  Body mass index is 45.84 kg/m².    Current medications reviewed.  Hospital Medications:   [COMPLETED] magnesium sulfate 40 mg/mL 4 g BOLUS FROM BAG infusion *For OB Use*  4 g Intravenous Once    Followed by    magnesium sulfate 40 mg/mL infusion premix  2 g/hr Intravenous Continuous    calcium gluconate injection 1 g  1 g Intravenous Once PRN    [START ON 2024] enoxaparin (Lovenox) 40 MG/0.4ML SUBQ injection 40 mg  40 mg Subcutaneous Daily    oxyTOCIN in sodium chloride 0.9% (Pitocin) 30 Units/500mL infusion premix  62.5-900 le-units/min Intravenous Continuous    gentamicin sulfate (Garamycin) 40 MG/ML injection 640 mg  5 mg/kg Intravenous Once    And    sodium chloride 0.9% infusion 100 mL  100 mL Intravenous Once    And    [COMPLETED] clindamycin phosphate in NaCl 0.9% (Cleocin) 900 mg/50mL IVPB premix 900 mg  900 mg Intravenous Once    miSOPROStol (Cytotec) 200 MCG tab        tranexamic acid in sodium chloride 0.7% (Cyklokapron) 1000 mg/100mL infusion premix        lactated ringers infusion   Intravenous Continuous    acetaminophen (Tylenol Extra Strength) tab 1,000 mg  1,000 mg Oral Q6H PRN    acetaminophen (Tylenol Extra Strength) tab 500 mg  500 mg Oral Q6H PRN    Or    acetaminophen (Tylenol Extra Strength) tab 1,000 mg  1,000 mg Oral Q6H PRN    zolpidem (Ambien) tab 5 mg  5 mg Oral Nightly PRN    docusate sodium (Colace) cap 100 mg  100 mg Oral BID    calcium carbonate (Tums) chewable tab 1,000 mg  1,000 mg Oral Daily PRN    prenatal vitamin with DHA (PNV with DHA) cap 1 capsule  1 capsule  Oral Daily    labetalol (Normodyne) tab 300 mg  300 mg Oral TID    aspirin DR tab 81 mg  81 mg Oral Daily    insulin degludec 100 units/mL flextouch 48 Units  48 Units Subcutaneous Nightly       Outpatient Medications:     Medications Prior to Admission   Medication Sig Dispense Refill Last Dose    Insulin Glargine-yfgn (SEMGLEE, YFGN,) 100 UNIT/ML Subcutaneous Solution Pen-injector Inject 48 Units into the skin at bedtime. 15 mL 3     Insulin Pen Needle 30G X 5 MM Does not apply Misc Use a new needle for each insulin injection 100 each 1     labetalol 200 MG Oral Tab Take 1 tablet (200 mg total) by mouth 3 (three) times daily. 90 tablet 2     aspirin 81 MG Oral Tab EC Take 1 tablet (81 mg total) by mouth daily.       Sharps Container Does not apply Misc Placed use insulin needles in the sharps container 1 each 1     Glucose Blood (ONETOUCH VERIO) In Vitro Strip 1 each by Finger stick route 4 (four) times daily. Please test in the morning and 2 hour after each meal. 100 strip 3     Blood Glucose Monitoring Suppl (ONETOUCH VERIO FLEX SYSTEM) w/Device Does not apply Kit 1 kit 4 (four) times daily. Test sugar first thing in the morning and 2 hours after each meal. 1 kit 0     Lancets (ONETOUCH DELICA PLUS HMVHVQ30P) Does not apply Misc 4 each 4 (four) times daily. Test morning and 2 hours after each meal. 100 each 3     Prenatal Vit w/Ey-Nqvzcgiph-LQ (PNV OR) Take by mouth daily.          Allergies: Amoxicillin, Augmentin [amoxicillin-pot clavulanate], Doxycycline, and Penicillins      Anesthesia Evaluation    Patient summary reviewed.    Anesthetic Complications  (-) history of anesthetic complications (previous failed epidural, c/s under GETA)         GI/Hepatic/Renal      (-) GERD                           Cardiovascular        Exercise tolerance: good     MET: >4    (+) obesity  (+) hypertension     (-) CAD                  (-) angina     (-) BOWERS         Endo/Other      (+) diabetes                             Pulmonary      (-) asthma  (-) COPD       (-) shortness of breath     (-) sleep apnea       Neuro/Psych                              Patient Active Problem List:     Dysmenorrhea     BMI 40.0-44.9, adult (HCC)     Abnormal complete blood count     Snoring     Tonsillar enlargement     Acute bronchitis with bronchospasm     Encounter for therapeutic drug monitoring     Depo contraception     Well woman exam with routine gynecological exam     Morbid obesity due to excess calories (HCC)     Pneumonia     Anxiety     Morbid obesity (HCC)     Swelling of joint of left knee     Acne vulgaris     Neck pain on left side     Colitis     Leukocytosis, unspecified type     Intractable diarrhea     Intractable vomiting with nausea, unspecified vomiting type     Salmonella enteritis     Hypokalemia     Leukocytosis     Pregnancy (HCC)            Past Surgical History:   Procedure Laterality Date          Contracept iud      IUD insertion 3/20/18 per Margaret/Dr. Lopez's office    D&c after delivery N/A 2022    Endoscopic ultrasound exam  2011    Tonsillectomy       Social History     Socioeconomic History    Marital status:    Tobacco Use    Smoking status: Former     Current packs/day: 0.50     Average packs/day: 0.5 packs/day for 8.0 years (4.0 ttl pk-yrs)     Types: Cigarettes    Smokeless tobacco: Never   Vaping Use    Vaping status: Never Used   Substance and Sexual Activity    Alcohol use: Not Currently     Comment: very rare     Drug use: No    Sexual activity: Yes     Partners: Male   Other Topics Concern    Caffeine Concern Yes     Comment: 2 cups    Exercise Yes     Comment: 5 x a week.cardio    Seat Belt Yes     History   Drug Use No     Available pre-op labs reviewed.  Lab Results   Component Value Date    WBC 16.2 (H) 2024    RBC 3.68 (L) 2024    HGB 10.8 (L) 2024    HCT 30.4 (L) 2024    MCV 82.6 2024    MCH 29.3 2024    MCHC 35.5 2024    RDW 13.0  2024    .0 2024     Lab Results   Component Value Date     2024    K 3.6 2024     2024    CO2 21.0 2024    BUN 6 (L) 2024    CREATSERUM 0.47 (L) 2024    GLU 94 2024    CA 9.2 2024            Airway      Mallampati: III  Mouth opening: >3 FB  TM distance: 4 - 6 cm  Neck ROM: full Cardiovascular    Cardiovascular exam normal.  Rhythm: regular  Rate: normal     Dental  Comment: Patient denies any loose/missing/cracked teeth. No gross abnormalities or loose teeth noted on exam.      Dentition appears grossly intact         Pulmonary    Pulmonary exam normal.                 Other findings              ASA: 2 and emergent  Plan: spinal  NPO status verified and patient meets guidelines.    Post-procedure pain management plan discussed with surgeon and patient.    Comment: Benefits of spinal anesthesia over the general anesthesia for  section was discussed with Sharlene Solo. Realistic expectations of spinal anesthesia were also discussed including  temporary inability to feel and move lower extremities, possibly uncomfortable feel of \"pressure\" during certain parts of the procedure, possible sudden nausea/vomiting, pruritus, PPDH as well as other serious but rare complications including, allergic reaction to medications, infection, epidural hematoma, nerve/cord injury. Possibility of spinal anesthetic failure necessitating additional sedating/analgesic medications, and/or conversion to GA was also discussed. Patient verbalizes understanding of risks. All questions were answered. Agreed to proceed with procedure as planned under spinal anesthesia.    Plan/risks discussed with: patient                Present on Admission:  **None**

## 2024-05-07 NOTE — H&P
[facility]    PATIENT'S NAME: FABIEN SEGURA   ATTENDING PHYSICIAN: Stepan Lopez M.D.   PATIENT ACCOUNT#:   [id number]    LOCATION:  [address] [place] [address]  MEDICAL RECORD #:   [id number]       YOB: 1995  ADMISSION DATE:       2024    HISTORY AND PHYSICAL EXAMINATION    HISTORY OF PRESENT ILLNESS:  The patient is a  2, para 1, at 35-5/7 weeks by dates, was admitted for observation last night with headaches and elevated blood pressure.  Initially, the blood pressure was 160/97.  It did come down with increasing her labetalol.  Her PC ratio was in the normal range.  Other labs were within normal limits.  The patient reports this morning good fetal movement, no leaking of fluid, no bleeding, no contractions.      PAST MEDICAL HISTORY:  Significant for A2 diabetes, chronic hypertension.    PAST SURGICAL HISTORY:  Significant for previous  and D and C.  Her  was in , 39 weeks, at [place].      MEDICATIONS:  She is on labetalol, was on 200 t.i.d., now she has been bumped to 300 t.i.d.  She has been on metformin.  She is getting Lantus insulin 48 units at bedtime.  She is getting 81 mg of aspirin twice a day and her prenatal vitamin.    ALLERGIES:  She has no allergies to medicines.    FAMILY HISTORY:  Noncontributory.    PHYSICAL EXAMINATION:    LUNGS:  Clear to auscultation.   HEART:  Regular rate and rhythm.  ABDOMEN:  Nontender, nondistended.  She had no right upper quadrant or epigastric pain.  No uterine tenderness.  EXTREMITIES:  DTRs +2/4.  Negative for clonus.    LABORATORY DATA:  This pregnancy, 1-hour was abnormal at 205; 3-hour was abnormal.  Blood type O positive.  Antibody negative.  Third trimester labs were normal.      ASSESSMENT:  Intrauterine pregnancy at 35+ weeks, A2 diabetic, chronic hypertension, obesity, previous .      PLAN:  Continue 24-hour urine.  She got her first dose of betamethasone this morning at 3 a.m.  Dr. Boles,  Maternal-Fetal Medicine, is going to come and see her later today.  Will wait for MFM evaluation.  Will consider repeating  if MFM feels she needs to be delivered.    Dictated By Colby Whitehead D.O.  d: 2024 11:39:40  t: 2024 11:58:39  Job [id number]  /

## 2024-05-07 NOTE — CONSULTS
[facility]  Maternal-Fetal Medicine Inpatient Consultation    Date of Admission:  2024  Date of Consult:  2024    Reason for Consult:   Headache and elevated blood pressures    History of Present Illness:  Sharlene Solo is a a(n) 29 year old female.  with an IUP at Gestational Age: <Non.ga    Who  presented yesterday evening with headache and elevated blood pressures at home.  She came to the hospital and had 2 severe range blood pressure readings.  She was give oral labetalol 300 mg and her blood pressure came down.  Her dose was increased from labetalol 200 mg 3 times a day to 300 mg 3 times a day and her blood pressures have improved.  Today she has increased headache.  She reports good fetal movement.    Laboratory values were reviewed and they are unchanged.  Earlier in her pregnancy she received a course of  corticosteroids for gestational hypertension.  This was on  and .  There is no indication for repeat course of  corticosteroids      Review of History:      OB History:    OB History    Para Term  AB Living   4 1 1 0 2 1   SAB IAB Ectopic Multiple Live Births   2 0 0 0 1      # Outcome Date GA Lbr Matthew/2nd Weight Sex Type Anes PTL Lv   4 Current            3 SAB 2023 7w0d          2 SAB 2023 7w0d          1 Term 14 39w1d  7 lb 3.3 oz (3.27 kg) M CS-LTranv EPI N BERNARDO      Complications: Failure to progress      Apgar1: 9  Apgar5: 9       Other Relevant History:  Past Medical History:    ADHD (attention deficit hyperactivity disorder)     NO MEDS DURING PREG    Allergic rhinitis, cause unspecified    Anxiety    Bipolar 1 disorder (HCC)    NO MEDS DURING PREG    Bronchitis, not specified as acute or chronic    Essential hypertension    Gestational diabetes (HCC)    Infectious mononucleosis    Intractable vomiting with nausea, unspecified vomiting type    Otitis    Pneumonia    Pneumonia, organism unspecified(486)    as       Swollen tonsil     Past Surgical History:   Procedure Laterality Date          Contracept iud      IUD insertion 3/20/18 per Margaret/Dr. Lopez's office    D&c after delivery N/A 2022    Endoscopic ultrasound exam  2011    Tonsillectomy       Family History   Problem Relation Age of Onset    Diabetes Father     Heart Disorder Father     Heart Attack Father     Hypertension Father     Other (Other) Maternal Grandmother     Diabetes Paternal Grandmother     Diabetes Maternal Grandfather       reports that she has quit smoking. Her smoking use included cigarettes. She has a 4 pack-year smoking history. She has never used smokeless tobacco. She reports that she does not currently use alcohol. She reports that she does not use drugs.    Allergies:  Allergies   Allergen Reactions    Amoxicillin RASH    Augmentin [Amoxicillin-Pot Clavulanate] RASH    Doxycycline NAUSEA ONLY    Penicillins RASH       Medications:    Current Facility-Administered Medications:     acetaminophen (Tylenol Extra Strength) tab 1,000 mg, 1,000 mg, Oral, Q6H PRN    acetaminophen (Tylenol Extra Strength) tab 500 mg, 500 mg, Oral, Q6H PRN **OR** acetaminophen (Tylenol Extra Strength) tab 1,000 mg, 1,000 mg, Oral, Q6H PRN    zolpidem (Ambien) tab 5 mg, 5 mg, Oral, Nightly PRN    docusate sodium (Colace) cap 100 mg, 100 mg, Oral, BID    calcium carbonate (Tums) chewable tab 1,000 mg, 1,000 mg, Oral, Daily PRN    prenatal vitamin with DHA (PNV with DHA) cap 1 capsule, 1 capsule, Oral, Daily    labetalol (Normodyne) tab 300 mg, 300 mg, Oral, TID    aspirin DR tab 81 mg, 81 mg, Oral, Daily    insulin degludec 100 units/mL flextouch 48 Units, 48 Units, Subcutaneous, Nightly    Physical examination:  Physical Exam    IUP at 35w4d      Laboratory Data:  Lab Results   Component Value Date    WBC 16.2 2024    HGB 10.8 2024    HCT 30.4 2024    .0 2024    CREATSERUM 0.47 2024    BUN 6 2024    NA  136 2024    K 3.6 2024     2024    CO2 21.0 2024    GLU 94 2024    CA 9.2 2024    ALB 2.3 2024    ALKPHO 115 2024    BILT 0.1 2024    TP 6.1 2024    AST 5 2024    ALT 11 2024         NARRATIVE:   We discussed that she has gestational hypertension with severe features.  She had severe range hypertension on admission and now she is having worsening headache.    We discussed the recommended plan of care 35+ weeks with severe hypertension.  I advised her to move forward with delivery.  She has a history of  and planning repeat .  She has received  corticosteroids previously in the pregnancy.    I discussed with her magnesium sulfate to prevent seizures.  We also discussed the risk benefits of spinal anesthesia versus general anesthesia for .  She is agreeable to spinal anesthesia.  I spoke with Dr. Whitehead and he agrees with the plan of care.    IMPRESSION:    IUp at 35w4d  Gestational hypertension with severe features including headache and severe range hypertension  GDM A2  Maternal morbid obesity  History of a   Reassuring fetal tracing    RECOMMENDATIONS:   Magnesium sulfate for seizure prophylaxis  Move forward with delivery by repeat  when she is with appropriate gastric emptying  Continue antihypertensive therapy after delivery  Postpartum fasting blood sugar monitoring    Total time spent 60 minutes this calendar day which includes preparing to see the patient including chart review, obtaining and/or reviewing additional medical history, performing a physical exam and evaluation, documenting clinical information in the electronic medical record, independently interpreting results, counseling the patient, communicating results to the patient/family/caregiver and coordinating care.     Case discussed with patient who demonstrated understanding and agreement with plan.     Thank you  for allowing me to participate in the care of this patient.  Please feel free to contact me with any questions.    Harriett Boles MD  Maternal-Fetal Medicine        Harriett Boles MD  5/7/2024  1:23 PM

## 2024-05-07 NOTE — CM/SW NOTE
Team rounds done on patient. Team reviewed patient plan of care and possible discharge needs. Team members present: Jaqueline HERÁNNDEZ RN Case Manager, Praneeth SILVA, and RN caring for patient.

## 2024-05-07 NOTE — PLAN OF CARE
Problem: ANTEPARTUM/LABOR and DELIVERY  Goal: Maintain pregnancy as long as maternal and/or fetal condition is stable  Description: INTERVENTIONS:  - Maternal surveillance  - Fetal surveillance  - Monitor uterine activity  - Medications as ordered  - Bedrest  Outcome: Progressing  Goal: Anxiety is at manageable level  Description: INTERVENTIONS:  - [place] patient to unit/surroundings  - Establish a trusting relationship with patient  - Discuss possible complications or alterations in birth plan  - Explain treatment plan  - Explain testing/procedures prior to initiation  - Encourage participation in care  - Encourage verbalization of concerns/fears  - Identify coping mechanisms  - Administer/offer alternative therapies (Aroma therapy, distraction, guided imagery, massage, music therapy, therapeutic touch)  - Manage patient's environment (Avoid overstimulation of patient)  Outcome: Progressing  Goal: Demonstrates ability to cope with hospitalization/illness  Description: INTERVENTIONS:  - Encourage verbalization of feelings/concerns/expectations  - Provide quiet environment  - Assist patient to identify own strengths and abilities  - Encourage patient to set small goals for self  - Encourage participation in diversional activity  - Reinforce positive adaptation of new coping behaviors  - Include patient/family/caregiver in decisions  Outcome: Progressing     Problem: Patient/Family Goals  Goal: Patient/Family Long Term Goal  Description: Patient's Long Term Goal: Maintain pregnancy as long as maternal/fetal conditions remain stable    Interventions:  -   - See additional Care Plan goals for specific interventions  Outcome: Progressing  Goal: Patient/Family Short Term Goal  Description: Patient's Short Term Goal: Maintain BPs WNL    Interventions:   - Bedrest, PO meds   - See additional Care Plan goals for specific interventions  Outcome: Progressing

## 2024-05-08 LAB
ALBUMIN SERPL-MCNC: 2.3 G/DL (ref 3.4–5)
ALBUMIN/GLOB SERPL: 0.6 {RATIO} (ref 1–2)
ALP LIVER SERPL-CCNC: 109 U/L
ALT SERPL-CCNC: 13 U/L
ANION GAP SERPL CALC-SCNC: 6 MMOL/L (ref 0–18)
AST SERPL-CCNC: 14 U/L (ref 15–37)
BASOPHILS # BLD AUTO: 0.07 X10(3) UL (ref 0–0.2)
BASOPHILS NFR BLD AUTO: 0.3 %
BILIRUB SERPL-MCNC: 0.3 MG/DL (ref 0.1–2)
BUN BLD-MCNC: 8 MG/DL (ref 9–23)
CALCIUM BLD-MCNC: 8.7 MG/DL (ref 8.5–10.1)
CHLORIDE SERPL-SCNC: 110 MMOL/L (ref 98–112)
CO2 SERPL-SCNC: 21 MMOL/L (ref 21–32)
CREAT BLD-MCNC: 0.62 MG/DL
EGFRCR SERPLBLD CKD-EPI 2021: 124 ML/MIN/1.73M2 (ref 60–?)
EOSINOPHIL # BLD AUTO: 0.03 X10(3) UL (ref 0–0.7)
EOSINOPHIL NFR BLD AUTO: 0.1 %
ERYTHROCYTE [DISTWIDTH] IN BLOOD BY AUTOMATED COUNT: 12.9 %
GLOBULIN PLAS-MCNC: 3.7 G/DL (ref 2.8–4.4)
GLUCOSE BLD-MCNC: 75 MG/DL (ref 70–99)
HCT VFR BLD AUTO: 31 %
HGB BLD-MCNC: 10.5 G/DL
IMM GRANULOCYTES # BLD AUTO: 0.14 X10(3) UL (ref 0–1)
IMM GRANULOCYTES NFR BLD: 0.7 %
LYMPHOCYTES # BLD AUTO: 4.21 X10(3) UL (ref 1–4)
LYMPHOCYTES NFR BLD AUTO: 20.4 %
MCH RBC QN AUTO: 28.8 PG (ref 26–34)
MCHC RBC AUTO-ENTMCNC: 33.9 G/DL (ref 31–37)
MCV RBC AUTO: 84.9 FL
MONOCYTES # BLD AUTO: 1.23 X10(3) UL (ref 0.1–1)
MONOCYTES NFR BLD AUTO: 6 %
NEUTROPHILS # BLD AUTO: 14.96 X10 (3) UL (ref 1.5–7.7)
NEUTROPHILS # BLD AUTO: 14.96 X10(3) UL (ref 1.5–7.7)
NEUTROPHILS NFR BLD AUTO: 72.5 %
OSMOLALITY SERPL CALC.SUM OF ELEC: 281 MOSM/KG (ref 275–295)
PLATELET # BLD AUTO: 399 10(3)UL (ref 150–450)
POTASSIUM SERPL-SCNC: 4.2 MMOL/L (ref 3.5–5.1)
PROT SERPL-MCNC: 6 G/DL (ref 6.4–8.2)
RBC # BLD AUTO: 3.65 X10(6)UL
SODIUM SERPL-SCNC: 137 MMOL/L (ref 136–145)
URATE SERPL-MCNC: 5.9 MG/DL
WBC # BLD AUTO: 20.6 X10(3) UL (ref 4–11)

## 2024-05-08 RX ORDER — DIPHENHYDRAMINE HCL 25 MG
50 CAPSULE ORAL EVERY 6 HOURS PRN
Status: DISCONTINUED | OUTPATIENT
Start: 2024-05-08 | End: 2024-05-10

## 2024-05-08 RX ORDER — DOCUSATE SODIUM 100 MG/1
100 CAPSULE, LIQUID FILLED ORAL
Status: DISCONTINUED | OUTPATIENT
Start: 2024-05-08 | End: 2024-05-10

## 2024-05-08 RX ORDER — IBUPROFEN 600 MG/1
600 TABLET ORAL EVERY 6 HOURS
Status: DISCONTINUED | OUTPATIENT
Start: 2024-05-08 | End: 2024-05-10

## 2024-05-08 RX ORDER — DIPHENHYDRAMINE HCL 25 MG
25 CAPSULE ORAL EVERY 6 HOURS PRN
Status: DISCONTINUED | OUTPATIENT
Start: 2024-05-08 | End: 2024-05-10

## 2024-05-08 RX ORDER — GABAPENTIN 300 MG/1
300 CAPSULE ORAL EVERY 8 HOURS PRN
Status: DISCONTINUED | OUTPATIENT
Start: 2024-05-08 | End: 2024-05-10

## 2024-05-08 RX ORDER — DIPHENHYDRAMINE HYDROCHLORIDE 50 MG/ML
50 INJECTION INTRAMUSCULAR; INTRAVENOUS EVERY 4 HOURS PRN
Status: DISCONTINUED | OUTPATIENT
Start: 2024-05-08 | End: 2024-05-10

## 2024-05-08 RX ORDER — ONDANSETRON 2 MG/ML
4 INJECTION INTRAMUSCULAR; INTRAVENOUS EVERY 6 HOURS PRN
Status: DISCONTINUED | OUTPATIENT
Start: 2024-05-08 | End: 2024-05-10

## 2024-05-08 RX ORDER — ACETAMINOPHEN 500 MG
1000 TABLET ORAL EVERY 6 HOURS
Status: DISCONTINUED | OUTPATIENT
Start: 2024-05-08 | End: 2024-05-10

## 2024-05-08 RX ORDER — DEXTROSE, SODIUM CHLORIDE, SODIUM LACTATE, POTASSIUM CHLORIDE, AND CALCIUM CHLORIDE 5; .6; .31; .03; .02 G/100ML; G/100ML; G/100ML; G/100ML; G/100ML
INJECTION, SOLUTION INTRAVENOUS CONTINUOUS PRN
Status: DISCONTINUED | OUTPATIENT
Start: 2024-05-08 | End: 2024-05-10

## 2024-05-08 RX ORDER — LABETALOL 200 MG/1
200 TABLET, FILM COATED ORAL EVERY 8 HOURS
Status: DISCONTINUED | OUTPATIENT
Start: 2024-05-08 | End: 2024-05-08

## 2024-05-08 RX ORDER — BISACODYL 10 MG
10 SUPPOSITORY, RECTAL RECTAL ONCE AS NEEDED
Status: DISCONTINUED | OUTPATIENT
Start: 2024-05-08 | End: 2024-05-10

## 2024-05-08 RX ORDER — SIMETHICONE 80 MG
80 TABLET,CHEWABLE ORAL 3 TIMES DAILY PRN
Status: DISCONTINUED | OUTPATIENT
Start: 2024-05-08 | End: 2024-05-10

## 2024-05-08 NOTE — PLAN OF CARE
Problem: ANTEPARTUM/LABOR and DELIVERY  Goal: Maintain pregnancy as long as maternal and/or fetal condition is stable  Description: INTERVENTIONS:  - Maternal surveillance  - Fetal surveillance  - Monitor uterine activity  - Medications as ordered  - Bedrest  Outcome: Completed  Goal: Anxiety is at manageable level  Description: INTERVENTIONS:  - [place] patient to unit/surroundings  - Establish a trusting relationship with patient  - Discuss possible complications or alterations in birth plan  - Explain treatment plan  - Explain testing/procedures prior to initiation  - Encourage participation in care  - Encourage verbalization of concerns/fears  - Identify coping mechanisms  - Administer/offer alternative therapies (Aroma therapy, distraction, guided imagery, massage, music therapy, therapeutic touch)  - Manage patient's environment (Avoid overstimulation of patient)  Outcome: Completed  Goal: Demonstrates ability to cope with hospitalization/illness  Description: INTERVENTIONS:  - Encourage verbalization of feelings/concerns/expectations  - Provide quiet environment  - Assist patient to identify own strengths and abilities  - Encourage patient to set small goals for self  - Encourage participation in diversional activity  - Reinforce positive adaptation of new coping behaviors  - Include patient/family/caregiver in decisions  Outcome: Completed     Problem: Patient/Family Goals  Goal: Patient/Family Long Term Goal  Description: Patient's Long Term Goal: Maintain pregnancy as long as maternal/fetal conditions remain stable    Interventions:  -   - See additional Care Plan goals for specific interventions  Outcome: Completed  Goal: Patient/Family Short Term Goal  Description: Patient's Short Term Goal: Maintain BPs WNL    Interventions:   - Bedrest, PO meds   - See additional Care Plan goals for specific interventions  Outcome: Completed     Problem: BIRTH - VAGINAL/ SECTION  Goal: Fetal and maternal  status remain reassuring during the birth process  Description: INTERVENTIONS:  - Monitor vital signs  - Monitor fetal heart rate  - Monitor uterine activity  - Monitor labor progression (vaginal delivery)  - DVT prophylaxis (C/S delivery)  - Surgical antibiotic prophylaxis (C/S delivery)  Outcome: Completed     Problem: PAIN - ADULT  Goal: Verbalizes/displays adequate comfort level or patient's stated pain goal  Description: INTERVENTIONS:  - Encourage pt to monitor pain and request assistance  - Assess pain using appropriate pain scale  - Administer analgesics based on type and severity of pain and evaluate response  - Implement non-pharmacological measures as appropriate and evaluate response  - Consider cultural and social influences on pain and pain management  - Manage/alleviate anxiety  - Utilize distraction and/or relaxation techniques  - Monitor for opioid side effects  - Notify MD/LIP if interventions unsuccessful or patient reports new pain  - Anticipate increased pain with activity and pre-medicate as appropriate  Outcome: Completed     Problem: ANXIETY  Goal: Will report anxiety at manageable levels  Description: INTERVENTIONS:  - Administer medication as ordered  - Teach and rehearse alternative coping skills  - Provide emotional support with 1:1 interaction with staff  Outcome: Completed

## 2024-05-08 NOTE — PROGRESS NOTES
Patient transferred to mother/baby room 2206 per cart in stable condition wither personal belongings.  Accompanied by family and staff. Report given to mother/baby RN.

## 2024-05-08 NOTE — PROGRESS NOTES
[facility] 1SW-J [facility] Patient Status:  Inpatient   Age/Gender 29 year old female MRN [id number]   Location [facility] 1SW-J Attending Stepan Lopez MD   Hosp Day # 1 PCP Rakel Miner MD      Anesthesia Pain Progress Note    Anesthesia Technique:   Spinal Anesthesia     Pain Management Technique:  In addition to available oral supplemental and IV medications  Patient received neuraxial preservative free morphine for post procedural pain control.    Post Procedure Pain Quality:    Adequate    Pain Management Side Effects:  Nausea improving without treatment.     /81 (BP Location: Right arm)   Pulse 73   Temp 98.9 °F (37.2 °C) (Oral)   Resp 16   Ht 1.676 m (5' 6\")   Wt 128.8 kg (284 lb)   LMP 2023 (Exact Date)   SpO2 95%   Breastfeeding Yes   BMI 45.84 kg/m²       Injection Site: Injection site is intact on inspection     Complications from Pain Management or Anesthesia:   None    All patient questions were answered.  Follow up pain management is separate from intraoperative anesthetic needs.  Pain care is transitioned to primary service, with management by oral medications.    Thank you for asking us to participate in the care of your patient.    Abilio Bhandari MD, 24, 9:32 AM      Abilio Bhandari MD, 24, 9:32 AM

## 2024-05-08 NOTE — PROGRESS NOTES
OB Postpartum Progress Note    Subjective:  Patient seen and examined at bedside this AM. Emesis ended around 0430.  Pain well controlled with PO meds. Tolerating diet without N/V. Ambulating without difficulty. Catheter in place.  Pt remains afebrile and denies chills/sweats. Denies HA/dizziness/vision changes/CP/palpitations/dyspnea/fevers/chills. Baby in NICU.  Lochia stable. No questions/concerns expressed at this time.       Objective:  VITALS:  [unfilled]    PHYSICAL EXAMINATION:  Gen:  AOx3, NAD, resting comfortably in bed  HEENT: NC/AT  Pulm: CTAB, no wheezes, rhonchi, or rales  CV:  RRR, normal S1/S2, no murmurs, rubs or gallops  Abd: Soft/NT/nondistended, + normoactive BS, fundus firm and below umbilicus.  Incision covered with bandage   Ext:  No C/C/E, no calf tenderness  Neuro: Alert, CNs II-XII grossly intact  Skin/Lines: warm, dry, no rashes, no ulcerations     LABS/IMAGING:  CBC  Recent Labs   Lab 24  1702 24  0619 24  0842   RBC 4.30 3.68* 3.65*   HGB 12.3 10.8* 10.5*   HCT 36.9 30.4* 31.0*   MCV 85.8 82.6 84.9   MCH 28.6 29.3 28.8   MCHC 33.3 35.5 33.9   RDW 12.9 13.0 12.9   NEPRELIM 10.92* 10.41* 14.96*   WBC 16.0* 16.2* 20.6*   .0* 395.0 399.0           MEDICATIONS:  No current outpatient medications on file.         Assessment/ Plan:    Sharlene Solo is a 29 year old  POD# 1 s/p RCS with severe pre-eclampsia     VSS, meeting postpartum milestones    - Continue routine postpartum orders and nursing care  - FEN: Increase to CLD, HLIV  - Pain: PO tylenol, motrin, gabapentin, oxy  - GI: Colace, zofran, reglan   - Heme: Lochia stable, Hbg 10.5  - : Iyer out- void trial   - CV: on labetalol- monitor BPs  - PPx:  Increase activity and ambulation, encouraged breast feeding  - DISPO: CPM    Nate Schneider, DO

## 2024-05-08 NOTE — ANESTHESIA PROCEDURE NOTES
Spinal Block    Date/Time: 5/7/2024 8:02 PM    Performed by: José Lindsay MD  Authorized by: José Lindsay MD      General Information and Staff    Start Time:  5/7/2024 8:02 PM  End Time:  5/7/2024 8:05 PM  Anesthesiologist:  José Lindsay MD  Performed by:  Anesthesiologist  Patient Location:  OR  Site identification: surface landmarks    Preanesthetic Checklist: patient identified, IV checked, risks and benefits discussed, monitors and equipment checked, pre-op evaluation, timeout performed, anesthesia consent and sterile technique used      Procedure Details    Patient Position:  Sitting  Prep: ChloraPrep    Monitoring:  Cardiac monitor, heart rate and continuous pulse ox  Approach:  Midline  Location:  L3-4  Injection Technique:  Single-shot    Needle    Needle Type:  Sprotte  Needle Gauge:  24 G  Needle Length:  3.5 in  Needle Insertion Depth:  10    Assessment    Sensory Level:   Events: clear CSF, CSF aspirated, well tolerated and blood negative      Additional Comments       In sitting position, patient prepped and draped in standard sterile fashion; mask, hat, and sterile gloves worn; at the approximate L3-4 lumbar level, clear CSF obtained. 90 mm Sprotte 24G needle hubbed at skin    1.7 mL of bupivacaine 0.75% with morphine 0.2 mg and fentanyl 15 mcg was injected without any paraesthesias. Patient tolerated procedure well, without any immediate complications. Adequate anesthetic level obtained prior to the incision.

## 2024-05-08 NOTE — PROGRESS NOTES
MOM ADMISSION NOTE:  Report received from WANDA Serna  Patient admitted to room in stable condition via: wheelchair     Oriented to room, safety precautions initiated, bed in low position, and call light in reach.   Plan of care and safety instructions reviewed, teaching sheets at bedside. VS and assessment initiated.

## 2024-05-08 NOTE — OPERATIVE REPORT
[facility]    PATIENT'S NAME: FABIEN SEGURA   ATTENDING PHYSICIAN: Stepan Lopez M.D.   OPERATING PHYSICIAN: Colby Whitehead D.O.   PATIENT ACCOUNT#:   [id number]    LOCATION:  [address] [place] [address]  MEDICAL RECORD #:   [id number]       YOB: 1995  ADMISSION DATE:       2024      OPERATION DATE:  2024    OPERATIVE REPORT      PREOPERATIVE DIAGNOSIS:  Intrauterine pregnancy at 35-4/7 weeks by dates, pre-eclampsia with headaches.  POSTOPERATIVE DIAGNOSIS:  Intrauterine pregnancy at 35-4/7 weeks by dates, pre-eclampsia with headaches.  PROCEDURE:  Repeat low transverse  section.    ASSISTANT SURGEON:  Waleska Mesa MD.    ANESTHESIA:  Spinal, Dr. Lindsay.    INDICATIONS:  This is a 29-year-old female who had chronic hypertension with pregnancy-induced hypertension with headaches.  She came in with elevated pressures on the afternoon of the , was watched.  Labetalol was increased to 300 t.i.d.  Her pressures improved, but she developed a headache that was not going away after evaluation by Maternal-Fetal Medicine.  Earlier today, they recommended delivery.  The risks, complications, and alternatives to procedure were discussed with her and her family.  She was a repeat .  Consents were signed.    OPERATIVE TECHNIQUE:  She was taken to the OR, and she was given spinal anesthetic, placed in supine position.  Iyer had been placed on labor and delivery due to her being on magnesium.  Her magnesium was stopped prior to coming to the OR.  Pressures in the OR, she did drop her pressure and Anesthesia gave her 800 to 900 mcg phenylephrine to keep her pressure up.  After good anesthetic level was obtained, we did bring her  in.  A Pfannenstiel incision was made in the skin, carried down to subcutaneous tissue to the fascia.  Fascia was opened, extended transversely bilaterally.  Rectus muscles  in midline.  Peritoneum was opened, care not to damage  underlying structures.  Bladder blade was placed, and a low transverse incision was made, carried down to the amniotic sac.  The low transverse incision was extended transversely.  Amniotic sac was ruptured with an Allis.  Infant was delivered without difficulty, was a viable male.  Cord was loose.  Nuchal cord x1.  It was loose and reduced.  Infant did cry.  Initially, we suctioned him.  His tone was not good.  Initially, mom had been on magnesium.  He was handed to the awaiting nursing staff.  One-minute Apgar was 5, five-minute Apgar was 8.  Dr. Tamez was the neonatologist.  Cord gas and cord blood were obtained.  Placenta was delivered manually also.  Potassium was dripped and was started.  She did get 10 IM of Pitocin.  Uterus did firm up.  Bladder blade was replaced.  Rings were placed in a low-transverse incision.  First layer, the uterus was closed with #1 chromic in a running locking fashion.  We irrigated until clear.  Uterus was carefully placed back in the abdominal cavity.  We went ahead and massaged the uterus.  We had good firm uterine contractions.  We monitored for bleeding.  There were no signs of active bleeding.  Surgicel SNoW was placed on the low transverse incision.  We then went ahead and uterus had been placed back in the abdominal cavity also.  We then went ahead and put curved 6's in the peritoneum.  There was omentum that was stuck on the right side of the peritoneum.  We did go up to 2 curved 6 across the omentum and was cut and then free tied with 2-0 Vicryl.  Good hemostasis was noted.  We closed the peritoneum without difficulty.  The rectus muscles were irrigated.  No signs of active bleeding.  Fascia was closed with #1 looped PDS starting on the right side of the fascial incision to the left side.  PDS knot was buried.  Subcutaneous was irrigated prior to closing the peritoneum.  All sponge and instrument counts were correct x2.  We went ahead and closed the subcutaneous with 0  Vicryl on a CT-1 needle.  The skin was closed with 4-0 Monocryl in a subcuticular fashion.  Mastisol and Steri-Strips were placed.  Pressure dressing was applied.  Clots were evacuated from the vagina with Betadine and Cytotec was placed 1000 mcg.  The patient did have a moderate amount of vomiting.  Blood loss was 660 mL.  The patient was transferred to Recovery in stable condition.    Dictated By Colby Whitehead D.O.  d: 05/07/2024 21:25:18  t: 05/07/2024 21:33:33  [facility] [id number]  /

## 2024-05-08 NOTE — PAYOR COMM NOTE
--------------  ADMISSION REVIEW     Payor: [facility] [employer]OR FUND PPO  Subscriber #:  [id number]  Authorization Number: [id number]    Admit date: 24  Admit time:  7:59 PM       REVIEW DOCUMENTATION:    HISTORY OF PRESENT ILLNESS:  The patient is a  2, para 1, at 35-5/7 weeks by dates, was admitted for observation last night with headaches and elevated blood pressure.  Initially, the blood pressure was 160/97.  It did come down with increasing her labetalol.  Her PC ratio was in the normal range.  Other labs were within normal limits.  The patient reports this morning good fetal movement, no leaking of fluid, no bleeding, no contractions.       PAST MEDICAL HISTORY:  Significant for A2 diabetes, chronic hypertension.    MEDICATIONS: She is on labetalol, was on 200 t.i.d., now she has been bumped to 300 t.i.d. She has been on metformin. She is getting Lantus insulin 48 units at bedtime. She is getting 81 mg of aspirin twice a day and her prenatal vitamin.     PHYSICAL EXAMINATION:    LUNGS:  Clear to auscultation.   HEART:  Regular rate and rhythm.  ABDOMEN:  Nontender, nondistended.  She had no right upper quadrant or epigastric pain.  No uterine tenderness.  EXTREMITIES:  DTRs +2/4.  Negative for clonus.     LABORATORY DATA:  This pregnancy, 1-hour was abnormal at 205; 3-hour was abnormal.  Blood type O positive.  Antibody negative.  Third trimester labs were normal.       ASSESSMENT:  Intrauterine pregnancy at 35+ weeks, A2 diabetic, chronic hypertension, obesity, previous .       PLAN:  Continue 24-hour urine.  She got her first dose of betamethasone this morning at 3 a.m.  Dr. Boles, Maternal-Fetal Medicine, is going to come and see her later today.     MFM CONSULT    IMPRESSION:    IUp at 35w4d  Gestational hypertension with severe features including headache and severe range hypertension  GDM A2  Maternal morbid obesity  History of a   Reassuring fetal tracing     RECOMMENDATIONS:    Magnesium sulfate for seizure prophylaxis  Move forward with delivery by repeat  when she is with appropriate gastric emptying  Continue antihypertensive therapy after delivery  Postpartum fasting blood sugar monitoring      REVIEW DOCUMENTATION  24    PREOPERATIVE DIAGNOSIS:  Intrauterine pregnancy at 35-4/7 weeks by dates, pre-eclampsia with headaches.  POSTOPERATIVE DIAGNOSIS:  Intrauterine pregnancy at 35-4/7 weeks by dates, pre-eclampsia with headaches.  PROCEDURE:  Repeat low transverse  section.    INDICATIONS: This is a 29-year-old female who had chronic hypertension with pregnancy-induced hypertension with headaches. She came in with elevated pressures on the afternoon of the , was watched. Labetalol was increased to 300 t.i.d. Her pressures improved, but she developed a headache that was not going away after evaluation by Maternal-Fetal Medicine. Earlier today, they recommended delivery. The risks, complications, and alternatives to procedure were discussed with her and her family. She was a repeat        MEDICATIONS ADMINISTERED IN LAST 1 DAY:  acetaminophen (Tylenol Extra Strength) tab 1,000 mg       Date Action Dose Route User    2024 1714 Given 1,000 mg Oral Dede Hartmann RN          acetaminophen (Tylenol Extra Strength) tab 1,000 mg       Date Action Dose Route User    2024 1046 Given 1,000 mg Oral Dede Hartmann RN          acetaminophen (Tylenol Extra Strength) tab 1,000 mg       Date Action Dose Route User    2024 0825 Given 1,000 mg Oral Daphne Funes RN          aspirin DR tab 81 mg       Date Action Dose Route User    2024 1041 Given 81 mg Oral Dede Hartmann RN          bupivacaine in dextrose (Marcaine) 0.75-8.25 % intrathecal/spinal injection       Date Action Dose Route User    2024 Given 1.7 mL Intrathecal José Lindsay MD          clindamycin phosphate in NaCl 0.9% (Cleocin) 900 mg/50mL IVPB premix 900 mg        Date Action Dose Route User    5/7/2024 1816 Given 900 mg Intravenous Dede Hartmann RN          clindamycin phosphate in NaCl 0.9% (Cleocin) 900 mg/50mL IVPB premix       Date Action Dose Route User    5/7/2024 1816 Given 900 mg Intravenous Dede Hartmann RN          dexamethasone (Decadron) 4 MG/ML injection       Date Action Dose Route User    5/7/2024 2000 Given 4 mg Intravenous José Lindsay MD          diphenhydrAMINE (Benadryl) 50 mg/mL  injection 50 mg       Date Action Dose Route User    5/8/2024 0500 Given 50 mg Intravenous Meagan Collins RN          fentaNYL (Sublimaze) 50 mcg/mL injection       Date Action Dose Route User    5/7/2024 2005 Given 15 mcg Intrathecal José Lindsay MD          gentamicin sulfate (Garamycin) 40 MG/ML injection 640 mg       Date Action Dose Route User    5/7/2024 2009 Given 82,440 mg Intravenous José Lindsay MD          lactated ringers infusion       Date Action Dose Route User    5/8/2024 0000 New Bag (none) Intravenous Meagan Collins RN    5/7/2024 1959 Continued by Anesthesia (none) Intravenous José Lindsay MD    5/7/2024 1959 Restarted (none) Intravenous José Lindsay MD    5/7/2024 1818 New Bag (none) Intravenous Dede Hartmann RN          lidocaine PF (Xylocaine-MPF) 1% injection       Date Action Dose Route User    5/7/2024 2002 Given 2 mL Infiltration José Lindsay MD          magnesium sulfate 40 mg/mL infusion premix       Date Action Dose Route User    5/7/2024 1443 New Bag 2 g/hr Intravenous Dede Hartmann RN          magnesium sulfate 40 mg/mL 4 g BOLUS FROM BAG infusion *For OB Use*       Date Action Dose Route User    5/7/2024 1433 Bolus from Bag 4 g Intravenous Dede Hartmann RN          metoclopramide (Reglan) 5 mg/mL injection       Date Action Dose Route User    5/7/2024 2000 Given 10 mg Intravenous José Lindsay MD          miSOPROStol (Cytotec) 200 MCG tab       Date Action Dose Route User     5/7/2024 2100 Given 1,000 mcg Rectal Meagan Collins RN          morphINE PF 2 MG/ML injection       Date Action Dose Route User    5/7/2024 2005 Given 0.2 mg Intrathecal José Lindsay MD          ondansetron (Zofran) 4 MG/2ML injection       Date Action Dose Route User    5/7/2024 2023 Given 4 mg Intravenous José Lindsay MD          ondansetron (Zofran) 4 MG/2ML injection 4 mg       Date Action Dose Route User    5/8/2024 0500 Given 4 mg Intravenous Meagan Collins, WANDA          oxyTOCIN in sodium chloride 0.9% (Pitocin) 30 Units/500mL infusion premix       Date Action Dose Route User    5/7/2024 2200 New Bag 900 le-units/min Intravenous Meagan Collins RN    5/7/2024 2050 Rate/Dose Change 999 mL/hr Intravenous José Lindsay MD    5/7/2024 2030 Rate/Dose Change 100 mL/hr Intravenous José Lindsay MD    5/7/2024 2020 New Bag 999 mL/hr Intravenous José Lindsay MD          oxyTOCIN in sodium chloride 0.9% (Pitocin) 30 Units/500mL infusion premix       Date Action Dose Route User    5/7/2024 2358 New Bag 62.5 le-units/min Intravenous Meagan Collins RN          oxytocin (Pitocin) 10 Units/mL injection       Date Action Dose Route User    5/7/2024 2053 Given 10 Units Intramuscular José Lindsay MD          phenylephrine (Jose A-Synephrine) 10 MG/ML injection       Date Action Dose Route User    5/7/2024 2032 Given 200 mcg Intravenous José Lindsay MD    5/7/2024 2019 Given 200 mcg Intravenous José Lindsay MD    5/7/2024 2014 Given 300 mcg Intravenous José Lindsay MD    5/7/2024 2011 Given 200 mcg Intravenous José Lindsay MD    5/7/2024 2007 Given 100 mcg Intravenous José Lindsay MD          prenatal vitamin with DHA (PNV with DHA) cap 1 capsule       Date Action Dose Route User    5/7/2024 1041 Given 1 capsule Oral Shahbaz, Dede, RN          prochlorperazine (Compazine) 10 MG/2ML injection 10 mg       Date Action Dose Route User     5/7/2024 2354 Given 10 mg Intravenous Meagan Collins, WANDA          tranexamic acid in sodium chloride 0.7% (Cyklokapron) 1000 mg/100mL infusion premix       Date Action Dose Route User    5/7/2024 2023 Given 1,000 mg Intravenous José Lindsay MD          labetalol (Normodyne) tab 300 mg       Date Action Dose Route User    5/7/2024 1041 Given 300 mg Oral Dede Hartmann RN          labetalol (Normodyne) tab 300 mg       Date Action Dose Route User    5/8/2024 0817 Given 300 mg Oral Daphne Funes RN            05/07/24 0845 97.8 °F (36.6 °C) 94 -- 133/55 -- -- -- --    05/07/24 0308 97.8 °F (36.6 °C) 80 14 130/64 -- -- -- --    05/07/24 0113 -- 95 -- 148/78 -- -- -- --    05/06/24 2313 -- 95 -- 125/62 -- -- -- --    05/06/24 2152 98.2 °F (36.8 °C) 100 18 118/73 -- -- -- --    05/06/24 2015 -- 80 -- 129/71 -- -- -- -- SR   05/06/24 1800 -- 105 -- 152/112 Abnormal  -- -- -- -- JN   05/06/24 1745 -- 115 -- 156/95 Abnormal  -- -- -- -- JN   05/06/24 1731 -- 110 -- 169/97 Abnormal  -- -- -- --    05/06/24 1721 98.6 °F (37 °C) -- 17 -- -- 284 lb -- --

## 2024-05-08 NOTE — BRIEF OP NOTE
Pre-Operative Diagnosis: IUP at 35 4/7 weeks.PIH        Post-Operative Diagnosis: same      Procedure Performed: REPEAT   LTCS      Surgeons and Role:     * Colby Whitehead DO - Primary     * Waleska Mesa MD - Assisting Surgeon    Assistant(s):        Surgical Findings: NORMAL     Specimen: PLACENTA     Estimated Blood Loss: 660 ML.     Dictation Number:  [id number]    Colby Whitehead DO  5/7/2024  9:17 PM

## 2024-05-08 NOTE — PLAN OF CARE
Problem: ANTEPARTUM/LABOR and DELIVERY  Goal: Maintain pregnancy as long as maternal and/or fetal condition is stable  Description: INTERVENTIONS:  - Maternal surveillance  - Fetal surveillance  - Monitor uterine activity  - Medications as ordered  - Bedrest  Outcome: Completed  Goal: Anxiety is at manageable level  Description: INTERVENTIONS:  - [place] patient to unit/surroundings  - Establish a trusting relationship with patient  - Discuss possible complications or alterations in birth plan  - Explain treatment plan  - Explain testing/procedures prior to initiation  - Encourage participation in care  - Encourage verbalization of concerns/fears  - Identify coping mechanisms  - Administer/offer alternative therapies (Aroma therapy, distraction, guided imagery, massage, music therapy, therapeutic touch)  - Manage patient's environment (Avoid overstimulation of patient)  Outcome: Completed  Goal: Demonstrates ability to cope with hospitalization/illness  Description: INTERVENTIONS:  - Encourage verbalization of feelings/concerns/expectations  - Provide quiet environment  - Assist patient to identify own strengths and abilities  - Encourage patient to set small goals for self  - Encourage participation in diversional activity  - Reinforce positive adaptation of new coping behaviors  - Include patient/family/caregiver in decisions  Outcome: Completed

## 2024-05-08 NOTE — ANESTHESIA POSTPROCEDURE EVALUATION
[facility]    Sharlene Courtney [facility] Patient Status:  Inpatient   Age/Gender 29 year old female MRN [id number]   Location [facility] LABOR & DELIVERY Attending Stepan Lopez MD   Hosp Day # 0 PCP Rakel Miner MD       Anesthesia Post-op Note     SECTION    Procedure Summary       Date: 24 Room / Location:  L+D OR   L+D OR    Anesthesia Start:  Anesthesia Stop:     Procedure:  SECTION Diagnosis: (same)    Surgeons: Colby Whitehead DO Anesthesiologist: José Lindsay MD    Anesthesia Type: spinal ASA Status: 2 - Emergent            Anesthesia Type: spinal    Vitals Value Taken Time   /58 24   Temp 97.6 24   Pulse 75 24   Resp 16 24   SpO2 96% 24       Patient Location: Labor and Delivery    Anesthesia Type: spinal    Airway Patency: patent    Postop Pain Control: adequate    Mental Status: preanesthetic baseline    Nausea/Vomiting: none    Cardiopulmonary/Hydration status: stable euvolemic    Complications: no apparent anesthesia related complications    Postop vital signs: stable    Comments:     Cable monitors were removed in the OR, and the patient was transported with observation to the recovery area personally with the OR team.  The patient was responsive in a meaningful way and demonstrated a good airway.  PACU monitors were then applied with device connection to Epic.  Full report signout, including report, identifications, history, procedure, anesthesia course, recovery expectations with chance for questions was provided to a responsible recovery RN.    Dental Exam: Unchanged from Preop    Patient to be discharged from PACU when criteria met.

## 2024-05-08 NOTE — CM/SW NOTE
05/08/24 0808   Financial Resource Strain   How hard is it for you to pay for things like household items or child/elder care? Not hard   Access to Medications   Do you have trouble affording medicines, medical supplies, or paying for your care? N   Utilities   In the past 12 months has the electric, gas, oil, or water company threatened to shut off services in your home? No   Food Insecurity   Recently, have there been times that your food ran out and you didn't have money to get more? Never true   Did patient receive WIC with this pregnancy? No   Transportation Needs   Currently, has lack of transportation kept you from getting where you want or need to go? (For ex: to medical appointments, picking up medications, groceries, or work)? no   Housing Stability   In the past 12 months, was there a time when you did not have a steady place to sleep or slept in a shelter? N   Domestic safety   At any time do you feel concerned for the safety/well-being of yourself and/or your children, in your home or elsewhere? N   Does healthcare provider observe any obvious signs or symptoms of abuse/neglect? No   Stress   Would you like help finding professional services to help with stress, depression, anxiety, or other mental health concerns? N   Were you screened prenatally for any mood disorders during pregnancy? Yes   Did you receive care for any mood disorders during your pregnancy? No   OUD Screening Risk Assessment   Did any of your parents have problems with alcohol or drug use? No   Do any of your friends (peers) have problems with alcohol or drug use? No   Does your partner have a problem with alcohol or drug use? No   Before you were pregnant did you have problems with alcohol or drug use, including prescription medications in the past? No   Did you drink beer, wine, or liquor in the past month, consume, smoke, or vape any marijuana products, use illegal drugs, and/or prescribed or non-prescribed pain medications? No      SW met with pt to complete assessment and offer support, as baby boy admitted to NICU.    Pt presented with a cheerful affect. Pt reports she lives in [place] with her sps, and 10 y/o son. Pt reports this is her second child, and her sps first baby. Pt reports strong support from family, friends, and sps. Pt reports adequate time off work.    Pt reports hx of anxiety, or depression. Reports she was previously prescribed Xanax. Pt denies any immediate PPA/PPD concerns. SW offered support and encouraged pt to reach out to her OBGYN/PCP with any further questions, or concerns for PPA/PPD.    SW reviewed support services for the NICU including [facility] Oropeza family room and sleep room areas, NICU Facebook page, NICU support group and role of NICU  with contact information. SW reviewed Postpartum Depression warning signs and support services.    SW to remain available for any dc planning, or additional need for support.    Praneeth Aguirre, OSMAN  Discharge Planner  [id number]

## 2024-05-09 ENCOUNTER — TELEPHONE (OUTPATIENT)
Dept: OBGYN UNIT | Facility: HOSPITAL | Age: 29
End: 2024-05-09

## 2024-05-09 LAB
ALBUMIN SERPL-MCNC: 2.3 G/DL (ref 3.4–5)
ALBUMIN/GLOB SERPL: 0.7 {RATIO} (ref 1–2)
ALP LIVER SERPL-CCNC: 102 U/L
ALT SERPL-CCNC: 12 U/L
ANION GAP SERPL CALC-SCNC: 5 MMOL/L (ref 0–18)
AST SERPL-CCNC: 14 U/L (ref 15–37)
BASOPHILS # BLD AUTO: 0.09 X10(3) UL (ref 0–0.2)
BASOPHILS NFR BLD AUTO: 0.7 %
BILIRUB SERPL-MCNC: 0.1 MG/DL (ref 0.1–2)
BUN BLD-MCNC: 11 MG/DL (ref 9–23)
CALCIUM BLD-MCNC: 8.5 MG/DL (ref 8.5–10.1)
CHLORIDE SERPL-SCNC: 110 MMOL/L (ref 98–112)
CO2 SERPL-SCNC: 25 MMOL/L (ref 21–32)
CREAT BLD-MCNC: 0.59 MG/DL
EGFRCR SERPLBLD CKD-EPI 2021: 126 ML/MIN/1.73M2 (ref 60–?)
EOSINOPHIL # BLD AUTO: 0.25 X10(3) UL (ref 0–0.7)
EOSINOPHIL NFR BLD AUTO: 1.9 %
ERYTHROCYTE [DISTWIDTH] IN BLOOD BY AUTOMATED COUNT: 13.2 %
GLOBULIN PLAS-MCNC: 3.4 G/DL (ref 2.8–4.4)
GLUCOSE BLD-MCNC: 81 MG/DL (ref 70–99)
HCT VFR BLD AUTO: 31.7 %
HGB BLD-MCNC: 10.2 G/DL
IMM GRANULOCYTES # BLD AUTO: 0.08 X10(3) UL (ref 0–1)
IMM GRANULOCYTES NFR BLD: 0.6 %
LYMPHOCYTES # BLD AUTO: 4 X10(3) UL (ref 1–4)
LYMPHOCYTES NFR BLD AUTO: 31.2 %
MCH RBC QN AUTO: 28.6 PG (ref 26–34)
MCHC RBC AUTO-ENTMCNC: 32.2 G/DL (ref 31–37)
MCV RBC AUTO: 88.8 FL
MONOCYTES # BLD AUTO: 0.99 X10(3) UL (ref 0.1–1)
MONOCYTES NFR BLD AUTO: 7.7 %
NEUTROPHILS # BLD AUTO: 7.42 X10 (3) UL (ref 1.5–7.7)
NEUTROPHILS # BLD AUTO: 7.42 X10(3) UL (ref 1.5–7.7)
NEUTROPHILS NFR BLD AUTO: 57.9 %
OSMOLALITY SERPL CALC.SUM OF ELEC: 288 MOSM/KG (ref 275–295)
PLATELET # BLD AUTO: 396 10(3)UL (ref 150–450)
POTASSIUM SERPL-SCNC: 3.6 MMOL/L (ref 3.5–5.1)
PROT SERPL-MCNC: 5.7 G/DL (ref 6.4–8.2)
RBC # BLD AUTO: 3.57 X10(6)UL
SODIUM SERPL-SCNC: 140 MMOL/L (ref 136–145)
WBC # BLD AUTO: 12.8 X10(3) UL (ref 4–11)

## 2024-05-09 RX ORDER — HYDROCODONE BITARTRATE AND ACETAMINOPHEN 5; 325 MG/1; MG/1
1 TABLET ORAL EVERY 6 HOURS PRN
Status: DISCONTINUED | OUTPATIENT
Start: 2024-05-09 | End: 2024-05-10

## 2024-05-09 RX ORDER — LABETALOL 200 MG/1
200 TABLET, FILM COATED ORAL EVERY 8 HOURS
Status: DISCONTINUED | OUTPATIENT
Start: 2024-05-09 | End: 2024-05-10

## 2024-05-09 NOTE — CONSULTS
[facility]    PATIENT'S NAME: FABIEN SEGURA   ATTENDING PHYSICIAN: Stepan Lopez M.D.   CONSULTING PHYSICIAN: Colby Whitehead D.O.   PATIENT ACCOUNT#:   [id number]    LOCATION:  [address] [place] [address]  MEDICAL RECORD #:   [id number]       YOB: 1995  ADMISSION DATE:       2024      CONSULT DATE:  2024    REPORT OF CONSULTATION    PROGRESS NOTE    The patient is postoperative day 2, status post repeat low transverse  section at 35+ weeks secondary to severe preeclampsia.  The patient is alert and oriented x3 today.  They did have to hold 2 doses of her labetalol because her pressures were low.  Last pressure was 120/65, temp was 98, pulse 79, respirations 16.  The patient states she is having more incisional pain this morning.  She is asking for a stronger pain medicine or narcotic.  She got 1 Norco last night and it helped.  We will go ahead and start her on 1 [place] every 6 hours p.r.n. for pain.      PHYSICAL EXAMINATION:    HEART:  Regular rate and rhythm.  LUNGS:  Clear to auscultation.  ABDOMEN:  Nondistended.  Bowel sounds x4.  Incision was clean and dry.  EXTREMITIES:  No calf or thigh tenderness.  The patient's DTRs were +2/4.  Negative for clonus.      LABORATORY DATA:  Her last white count was 20.6 yesterday, hemoglobin was 10.5, hematocrit was 31, platelets were 399.  We will go ahead and repeat her labs for tomorrow morning also.    ASSESSMENT:  Postoperative day 2, status post repeat low transverse  section secondary to severe preeclampsia.  Babu is in the NICU, doing well on room air, but having difficulty feeding.  Mom is requesting a narcotic; pain is increasing.    PLAN:  We will start Norco one 5/325 mg tab p.o. q.6 h. p.r.n. for pain and continue Motrin and Tylenol.  Repeat labs today and tomorrow.  Consider discharge home tomorrow.     Dictated By Colby Whitehead D.O.  d: 2024 07:40:04  t: 2024 08:07:37  Job [id number]  /

## 2024-05-09 NOTE — PROGRESS NOTES
05/09/24 0323   Provider Notification   Reason for Communication Review case  (Reviewed BP readings)   Provider Name Other (comment)  ()   Method of Communication Call   Response See orders  (Plan to hold labetalol and monitor BP every 4 hours.)

## 2024-05-09 NOTE — CM/SW NOTE
met with Sharlene (patient) to review insurance and PCP for infant in NICU. Infant will be added to [employer] that Sharlene delivered under.  reviewed insurance Auth process and discharge planning process. PCP will be Dr SULY Morgan in [place], [place] . Sharlene plans on breast feeding and will use formula to meet infant needs. Sharlene has breast pump at home. Sharlene also has crib and car seat for infant. Sharlene's older child is with grandparent, so Sharlene does have family in the area that are willing to help with needs of family. Sharlene has no concerns related to housing, food, utilities, or transportation at this time.  answered patients questions at this time. Patient has my chart access to infants chart in NICU. Sharlene working on adding to her my chart.

## 2024-05-10 VITALS
TEMPERATURE: 98 F | DIASTOLIC BLOOD PRESSURE: 73 MMHG | SYSTOLIC BLOOD PRESSURE: 140 MMHG | OXYGEN SATURATION: 95 % | HEART RATE: 85 BPM | HEIGHT: 66 IN | WEIGHT: 284 LBS | RESPIRATION RATE: 18 BRPM | BODY MASS INDEX: 45.64 KG/M2

## 2024-05-10 PROBLEM — Z34.90 PREGNANCY (HCC): Status: RESOLVED | Noted: 2024-04-19 | Resolved: 2024-05-10

## 2024-05-10 LAB
ALBUMIN SERPL-MCNC: 2.4 G/DL (ref 3.4–5)
ALBUMIN/GLOB SERPL: 0.6 {RATIO} (ref 1–2)
ALP LIVER SERPL-CCNC: 106 U/L
ALT SERPL-CCNC: 12 U/L
ANION GAP SERPL CALC-SCNC: 5 MMOL/L (ref 0–18)
AST SERPL-CCNC: 13 U/L (ref 15–37)
BASOPHILS # BLD AUTO: 0.07 X10(3) UL (ref 0–0.2)
BASOPHILS NFR BLD AUTO: 0.6 %
BILIRUB SERPL-MCNC: 0.2 MG/DL (ref 0.1–2)
BUN BLD-MCNC: 11 MG/DL (ref 9–23)
CALCIUM BLD-MCNC: 8.6 MG/DL (ref 8.5–10.1)
CHLORIDE SERPL-SCNC: 111 MMOL/L (ref 98–112)
CO2 SERPL-SCNC: 24 MMOL/L (ref 21–32)
CREAT BLD-MCNC: 0.51 MG/DL
EGFRCR SERPLBLD CKD-EPI 2021: 130 ML/MIN/1.73M2 (ref 60–?)
EOSINOPHIL # BLD AUTO: 0.33 X10(3) UL (ref 0–0.7)
EOSINOPHIL NFR BLD AUTO: 2.7 %
ERYTHROCYTE [DISTWIDTH] IN BLOOD BY AUTOMATED COUNT: 13.2 %
GLOBULIN PLAS-MCNC: 3.8 G/DL (ref 2.8–4.4)
GLUCOSE BLD-MCNC: 103 MG/DL (ref 70–99)
HCT VFR BLD AUTO: 32 %
HGB BLD-MCNC: 10.6 G/DL
IMM GRANULOCYTES # BLD AUTO: 0.05 X10(3) UL (ref 0–1)
IMM GRANULOCYTES NFR BLD: 0.4 %
LYMPHOCYTES # BLD AUTO: 3.71 X10(3) UL (ref 1–4)
LYMPHOCYTES NFR BLD AUTO: 30.3 %
MCH RBC QN AUTO: 28.3 PG (ref 26–34)
MCHC RBC AUTO-ENTMCNC: 33.1 G/DL (ref 31–37)
MCV RBC AUTO: 85.6 FL
MONOCYTES # BLD AUTO: 0.65 X10(3) UL (ref 0.1–1)
MONOCYTES NFR BLD AUTO: 5.3 %
NEUTROPHILS # BLD AUTO: 7.44 X10 (3) UL (ref 1.5–7.7)
NEUTROPHILS # BLD AUTO: 7.44 X10(3) UL (ref 1.5–7.7)
NEUTROPHILS NFR BLD AUTO: 60.7 %
OSMOLALITY SERPL CALC.SUM OF ELEC: 290 MOSM/KG (ref 275–295)
PLATELET # BLD AUTO: 433 10(3)UL (ref 150–450)
POTASSIUM SERPL-SCNC: 3.8 MMOL/L (ref 3.5–5.1)
PROT SERPL-MCNC: 6.2 G/DL (ref 6.4–8.2)
RBC # BLD AUTO: 3.74 X10(6)UL
SODIUM SERPL-SCNC: 140 MMOL/L (ref 136–145)
URATE SERPL-MCNC: 5.8 MG/DL
WBC # BLD AUTO: 12.3 X10(3) UL (ref 4–11)

## 2024-05-10 RX ORDER — FUROSEMIDE 40 MG/1
40 TABLET ORAL DAILY
Status: DISCONTINUED | OUTPATIENT
Start: 2024-05-10 | End: 2024-05-10

## 2024-05-10 RX ORDER — HYDROCODONE BITARTRATE AND ACETAMINOPHEN 5; 325 MG/1; MG/1
1 TABLET ORAL EVERY 6 HOURS PRN
Qty: 30 TABLET | Refills: 0 | Status: SHIPPED | OUTPATIENT
Start: 2024-05-10

## 2024-05-10 RX ORDER — IBUPROFEN 800 MG/1
800 TABLET ORAL EVERY 8 HOURS PRN
Qty: 30 TABLET | Refills: 3 | Status: SHIPPED | OUTPATIENT
Start: 2024-05-10

## 2024-05-11 NOTE — DISCHARGE SUMMARY
[facility]    PATIENT'S NAME: FABIEN SEGURA   ATTENDING PHYSICIAN: Stepan Lopez M.D.   PATIENT ACCOUNT#:   [id number]    LOCATION:  [address] [place] [address]  MEDICAL RECORD #:   [id number]       YOB: 1995  ADMISSION DATE:       2024      DISCHARGE DATE:  05/10/2024    DISCHARGE SUMMARY      HISTORY AND HOSPITAL COURSE:  The patient is postoperative day 3.  She is status post repeat low transverse  section through Pfannenstiel incision, delivered a viable male.  Both mom and baby are doing well.  Baby is doing better in the NICU, just not feeding well.  Mom was section for pre-eclampsia with severe features.  Postoperatively, she has done well.  Her blood pressures have normalized.  She is on p.o. labetalol and doing well.  Pressures have come down.  On day of discharge, she denies headaches, blurred vision, spots before eyes, abdominal pain.  She is ambulating, tolerating general diet, passing flatus and moving her bowels.      PHYSICAL EXAMINATION:    VITAL SIGNS:  Her temperature was 98.  Her pulse 85, respirations 18, last blood pressure was 140/73.     HEART:  Regular rate and rhythm.    LUNGS:  Clear to auscultation.    ABDOMEN:  Nontender and nondistended.  She had bowel sounds x4.  No guarding, rebound.  EXTREMITIES:  Her DTRs +2/4.  Negative for clonus.      LABORATORY DATA:  Her white count this morning on the  was 12.3, came down.  Hemoglobin 10.6, hematocrit was 32.  Her platelets were 433.  Her differential was normal.  Her AST was 14.  Her AST was 12.    ASSESSMENT:  Postoperative day 3 status post repeat  section at 35+ weeks secondary to pre-eclampsia with severe features.  Postoperatively mom has done well.  She is ambulating, tolerating general diet, and passing flatus.  Requesting to go home.    PLAN:  Will discharge her home.  She is going to follow up in the office with us in 1 week.  Told if she had headaches, blurred vision, spots before eyes, or  abdominal pain, immediately come back in.  Told if she had fever, chills, nausea, vomiting, come back in.  Also, not drive for 1 week.     Dictated By Colby Whitehead D.O.  d: 05/10/2024 19:51:19  t: 05/10/2024 23:53:31  [facility] [id number]  /

## 2024-05-11 NOTE — PROGRESS NOTES
Patient discharged home in stable condition, discharge papers signed,and education completed.  All questions answered and pt verbalized understanding when to call the doctor.

## 2024-05-12 PROBLEM — IMO0002: Status: ACTIVE | Noted: 2024-05-12

## 2024-05-13 ENCOUNTER — TELEPHONE (OUTPATIENT)
Dept: OBGYN UNIT | Facility: HOSPITAL | Age: 29
End: 2024-05-13

## 2024-05-30 ENCOUNTER — OFFICE VISIT (OUTPATIENT)
Facility: LOCATION | Age: 29
End: 2024-05-30

## 2024-05-30 VITALS — TEMPERATURE: 98 F | HEART RATE: 75 BPM

## 2024-05-30 DIAGNOSIS — N61.1 BREAST ABSCESS: Primary | ICD-10-CM

## 2024-05-30 PROCEDURE — 99243 OFF/OP CNSLTJ NEW/EST LOW 30: CPT | Performed by: SURGERY

## 2024-05-30 NOTE — H&P
Sharlene Solo is a 29 year old female  Chief Complaint   Patient presents with    New Patient     NP - Right breast abscess, ref by , painful, drainage, hx of HS,no other symptoms.       REFERRED BY    Patient presents with right breast abscess.  Patient has history of hidradenitis suppurativa.  She states she is breast-feeding.  She states she noted a swelling on the right side of her breast approximately 1 week ago.  She states she was doing warm, presses and last night the abscess drained spontaneously.  She states the area looks markedly better.  She claims negligible drainage now.  She has been started on an antibiotic with Dr. Whitehead       .           Past Medical History:    ADHD (attention deficit hyperactivity disorder)     NO MEDS DURING PREG    Allergic rhinitis, cause unspecified    Anxiety    Bipolar 1 disorder (HCC)    NO MEDS DURING PREG    Bronchitis, not specified as acute or chronic    Essential hypertension    Gestational diabetes (HCC)    Infectious mononucleosis    Intractable vomiting with nausea, unspecified vomiting type    Otitis    Pneumonia    Pneumonia, organism unspecified(486)    as      Swollen tonsil     Past Surgical History:   Procedure Laterality Date          Contracept iud      IUD insertion 3/20/18 per Margaret/Dr. Lopez's office    D&c after delivery N/A 2022    Endoscopic ultrasound exam      Tonsillectomy       Current Outpatient Medications on File Prior to Visit   Medication Sig Dispense Refill    Insulin Pen Needle 30G X 5 MM Does not apply Misc Use a new needle for each insulin injection 100 each 1    HYDROcodone-acetaminophen 5-325 MG Oral Tab Take 1 tablet by mouth every 6 (six) hours as needed. 30 tablet 0    ibuprofen 800 MG Oral Tab Take 1 tablet (800 mg total) by mouth every 8 (eight) hours as needed. 30 tablet 3    labetalol 200 MG Oral Tab Take 1 tablet (200 mg total) by mouth 3 (three) times daily. 90 tablet 2    Sharps  Container Does not apply Misc Placed use insulin needles in the sharps container 1 each 1    Blood Glucose Monitoring Suppl (ONETOUCH VERIO FLEX SYSTEM) w/Device Does not apply Kit 1 kit 4 (four) times daily. Test sugar first thing in the morning and 2 hours after each meal. 1 kit 0    Lancets (ONETOUCH DELICA PLUS [id number]) Does not apply Misc 4 each 4 (four) times daily. Test morning and 2 hours after each meal. 100 each 3    Prenatal Vit w/Wq-Ovxmruojl-NA (PNV OR) Take by mouth daily.       No current facility-administered medications on file prior to visit.     @ALL@  Family History   Problem Relation Age of Onset    Diabetes Father     Heart Disorder Father     Heart Attack Father     Hypertension Father     Other (Other) Maternal Grandmother     Diabetes Paternal Grandmother     Diabetes Maternal Grandfather      Social History     Socioeconomic History    Marital status:    Tobacco Use    Smoking status: Former     Current packs/day: 0.50     Average packs/day: 0.5 packs/day for 8.0 years (4.0 ttl pk-yrs)     Types: Cigarettes    Smokeless tobacco: Never   Vaping Use    Vaping status: Never Used   Substance and Sexual Activity    Alcohol use: Not Currently     Comment: very rare     Drug use: No    Sexual activity: Yes     Partners: Male   Other Topics Concern    Caffeine Concern Yes     Comment: 2 cups    Exercise Yes     Comment: 5 x a week.cardio    Seat Belt Yes     Social Determinants of Health     Financial Resource Strain: Low Risk  (5/8/2024)    Financial Resource Strain     Difficulty of Paying Living Expenses: Not hard at all     Med Affordability: No   Food Insecurity: No Food Insecurity (5/8/2024)    Food Insecurity     Food Insecurity: Never true   Transportation Needs: No Transportation Needs (5/8/2024)    Transportation Needs     Lack of Transportation: No   Stress: No Stress Concern Present (5/8/2024)    Stress     Feeling of Stress : No   Housing Stability: Low Risk  (5/8/2024)     Housing Stability     Housing Instability: No       ROS     GENERAL HEALTH: otherwise feels well, no weight loss, no fever or chills  SKIN: denies any unusual skin rashes or jaundice  HEENT: denies nasal congestion, sinus pain or sore throat; hearing loss negative,   EYES , no diplopia or vision changes  RESPIRATORY: denies shortness of breath, wheezing or cough   CARDIOVASCULAR: denies chest pain or BOWERS; no palpitations   GI: denies nausea, vomiting, constipation, diarrhea; no rectal bleeding; no heartburn  GENITAL/: no dysuria, urgency or frequency, no tea colored urine  MUSCULOSKELETAL: no joint complaints upper or lower extremities  HEMATOLOGY: denies hx anemia; denies bruising or excessive bleeding  Endocrine: no weight gain or loss no hot or cold intolerance    EXAM     Pulse 75, temperature 97.9 °F (36.6 °C), temperature source Temporal, last menstrual period 09/01/2023, currently breastfeeding.  GENERAL: well developed, well nourished female, in no apparent distress.    MENTAL STATUS :Alert, oriented x 3  PSYCH: normal mood and affect  SKIN: anicteric, no rashes, no bruising  EYES: PERRLA, EOMI, sclera anicteric,  conjunctiva without pallor  HEENT: normocephalic, atraumatic, TMs clear, nares patent, mouth moist, pharynx w/o erythema  NECK: supple, no JVD, no adenopathy, no thyromegaly  RESPIRATORY: clear to auscultation, no rales , rhonchi or wheezing  CARDIOVASCULAR: RRR, murmur negative  ABDOMEN: normal active BS, soft, nondistended  no HSM, no masses or hernias  LYMPHATIC: no axillary , supraclavicular or inguinal lymphadenopathy  EXTREMITIES: no cyanosis, clubbing or edema, no atrophy, full ROM  Breast examination performed with Vivek demonstrates evidence of a drained right breast abscess lateral aspect mild induration no fluctuance  STUDIES:     No results displayed because visit has over 200 results.          ASSESSMENT   Imp: Resolving right breast abscess patient currently on  antibiotics  PLan: Continue warm compresses follow-up in the office for examination in 3-week      Meds & Refills for this Visit:  Requested Prescriptions      No prescriptions requested or ordered in this encounter       Imaging & Consults:  None

## 2024-06-04 ENCOUNTER — PATIENT MESSAGE (OUTPATIENT)
Dept: ADMINISTRATIVE | Age: 29
End: 2024-06-04

## 2024-06-27 ENCOUNTER — OFFICE VISIT (OUTPATIENT)
Dept: FAMILY MEDICINE CLINIC | Facility: CLINIC | Age: 29
End: 2024-06-27

## 2024-06-27 VITALS
RESPIRATION RATE: 18 BRPM | DIASTOLIC BLOOD PRESSURE: 80 MMHG | WEIGHT: 285 LBS | BODY MASS INDEX: 45.8 KG/M2 | SYSTOLIC BLOOD PRESSURE: 130 MMHG | HEIGHT: 66 IN | HEART RATE: 98 BPM | OXYGEN SATURATION: 98 %

## 2024-06-27 DIAGNOSIS — O14.93 PRE-ECLAMPSIA IN THIRD TRIMESTER (HCC): ICD-10-CM

## 2024-06-27 DIAGNOSIS — L03.012 PARONYCHIA OF FINGER OF LEFT HAND: Primary | ICD-10-CM

## 2024-06-27 DIAGNOSIS — F41.9 ANXIETY: ICD-10-CM

## 2024-06-27 PROCEDURE — 3008F BODY MASS INDEX DOCD: CPT | Performed by: FAMILY MEDICINE

## 2024-06-27 PROCEDURE — 99215 OFFICE O/P EST HI 40 MIN: CPT | Performed by: FAMILY MEDICINE

## 2024-06-27 PROCEDURE — 3079F DIAST BP 80-89 MM HG: CPT | Performed by: FAMILY MEDICINE

## 2024-06-27 PROCEDURE — 3075F SYST BP GE 130 - 139MM HG: CPT | Performed by: FAMILY MEDICINE

## 2024-06-27 RX ORDER — ALPRAZOLAM 0.25 MG/1
0.25 TABLET ORAL 2 TIMES DAILY PRN
Qty: 60 TABLET | Refills: 5 | Status: SHIPPED | OUTPATIENT
Start: 2024-06-27

## 2024-06-27 RX ORDER — NEBIVOLOL 10 MG/1
10 TABLET ORAL DAILY
Qty: 30 TABLET | Refills: 3 | Status: SHIPPED | OUTPATIENT
Start: 2024-06-27

## 2024-06-27 RX ORDER — NORELGESTROMIN AND ETHINYL ESTRADIOL 35; 150 UG/MG; UG/MG
1 PATCH TRANSDERMAL WEEKLY
Qty: 3 PATCH | Refills: 11 | Status: SHIPPED | OUTPATIENT
Start: 2024-06-27

## 2024-06-27 RX ORDER — LEVOFLOXACIN 500 MG/1
500 TABLET, FILM COATED ORAL DAILY
Qty: 7 TABLET | Refills: 0 | Status: SHIPPED | OUTPATIENT
Start: 2024-06-27

## 2024-06-27 NOTE — PROGRESS NOTES
Chief Complaint   Patient presents with    Physical    Finger Pain     Middle finger infected on left hand.-4 days        HPI: L middle finger has been hurting for  days .It is swollen,warm, red, painful to touch.Pain is throbbing  in character. Some pus coming out. Radiation -none . moderate.  No weakness.  No numbness or tingling. Worsening.  Decrease weight bearing, no fever, chills, fatigue, malaise.  Prior history:none  Prior treatment:none  Pt recently got a baby had C section side infection and breast abscess.   Patient presents for recheck of hypertension. Pt has been taking medications as instructed, no medication side effects, home BP monitoring in the range of 110-120's systolic and 70-80's diastolic. Pregnancy induced and preeclampsia, on Labetolol 200mg TID now, forgets two does.    Current Outpatient Medications   Medication Sig Dispense Refill    mupirocin 2 % External Ointment Apply 1 Application topically 3 (three) times daily. 30 g 3    levoFLOXacin (LEVAQUIN) 500 MG Oral Tab Take 1 tablet (500 mg total) by mouth daily. 7 tablet 0    nebivolol (BYSTOLIC) 10 MG Oral Tab Take 1 tablet (10 mg total) by mouth daily. Stop Labetolol 30 tablet 3    ALPRAZolam (XANAX) 0.25 MG Oral Tab Take 1 tablet (0.25 mg total) by mouth 2 (two) times daily as needed. 60 tablet 5    Norelgestromin-Eth Estradiol 150-35 MCG/24HR Transdermal Patch Weekly Place 1 patch onto the skin once a week. 3 patch 11    Insulin Pen Needle 30G X 5 MM Does not apply Misc Use a new needle for each insulin injection (Patient not taking: Reported on 6/27/2024) 100 each 1    labetalol 200 MG Oral Tab Take 1 tablet (200 mg total) by mouth 3 (three) times daily. 90 tablet 2    HYDROcodone-acetaminophen 5-325 MG Oral Tab Take 1 tablet by mouth every 6 (six) hours as needed. (Patient not taking: Reported on 6/27/2024) 30 tablet 0    Sharps Container Does not apply Misc Placed use insulin needles in the sharps container (Patient not taking:  Reported on 2024) 1 each 1    Prenatal Vit w/Yw-Bpwdokoyb-SX (PNV OR) Take by mouth daily. (Patient not taking: Reported on 2024)        Past Medical History:    ADHD (attention deficit hyperactivity disorder)     NO MEDS DURING PREG    Allergic rhinitis, cause unspecified    Anxiety    Bipolar 1 disorder (HCC)    NO MEDS DURING PREG    Bronchitis, not specified as acute or chronic    Essential hypertension    Gestational diabetes (HCC)    Infectious mononucleosis    Intractable vomiting with nausea, unspecified vomiting type    Otitis    Pneumonia    Pneumonia, organism unspecified(486)    as      Swollen tonsil      Past Surgical History:   Procedure Laterality Date          Contracept iud      IUD insertion 3/20/18 per Margaret/Dr. Lopez's office    D&c after delivery N/A 2022    Endoscopic ultrasound exam  2011    Tonsillectomy        Family History   Problem Relation Age of Onset    Diabetes Father     Heart Disorder Father     Heart Attack Father     Hypertension Father     Other (Other) Maternal Grandmother     Diabetes Paternal Grandmother     Diabetes Maternal Grandfather       Social History     Socioeconomic History    Marital status:    Tobacco Use    Smoking status: Former     Current packs/day: 0.50     Average packs/day: 0.5 packs/day for 8.0 years (4.0 ttl pk-yrs)     Types: Cigarettes    Smokeless tobacco: Never   Vaping Use    Vaping status: Never Used   Substance and Sexual Activity    Alcohol use: Not Currently     Comment: very rare     Drug use: No    Sexual activity: Yes     Partners: Male   Other Topics Concern    Caffeine Concern Yes     Comment: 2 cups    Exercise Yes     Comment: 5 x a week.cardio    Seat Belt Yes     Social Determinants of Health     Financial Resource Strain: Low Risk  (2024)    Financial Resource Strain     Difficulty of Paying Living Expenses: Not hard at all     Med Affordability: No   Food Insecurity: No Food Insecurity  (5/8/2024)    Food Insecurity     Food Insecurity: Never true   Transportation Needs: No Transportation Needs (5/8/2024)    Transportation Needs     Lack of Transportation: No   Stress: No Stress Concern Present (5/8/2024)    Stress     Feeling of Stress : No   Housing Stability: Low Risk  (5/8/2024)    Housing Stability     Housing Instability: No           ROS:  GEN: no fever, no chills, no fatigue  CHEST: no chest pains.  SKIN: no rashes  HEM: no ecchymoses  JOINTS: no there joints pain.  NEURO: no tingling, no weakness, no abnormal sensation.      /80   Pulse 98   Resp 18   Ht 5' 6\" (1.676 m)   Wt 285 lb (129.3 kg)   LMP 09/01/2023 (Exact Date)   SpO2 98%   Breastfeeding No   BMI 46.00 kg/m²     PE:  Gen:  WD/WN NAD  HEENT:  PEERLA, EOM-i.  LUNGS:CTA genaro.  HEART:S1/S2 reg., no murmurs, clicks, gallops  SKIN:no rashes on the chest or back.  Neuro:  Reflexes at knees 2+ and symmetric  L middle finger:swelling,  erythema , tenderness,white  pus of the side of the nail.    A/P    ICD-10-CM    1. Paronychia of finger of left hand  L03.012 mupirocin 2 % External Ointment   Souk in hot water, f/u in 4 days if not better.  levoFLOXacin (LEVAQUIN) 500 MG Oral Tab      2. Anxiety  F41.9 ALPRAZolam (XANAX) 0.25 MG Oral Tab   3.Preeclampsya: stop labetlol, healthy diet.  Medication: Byastolic 10mg po every day.    Time: I spent 40 minutes preparing to see patient (including chart review and preparation), obtaining and or reviewing additional medical history, performing a physical exam and evaluation, documenting clinical information in the electronic health record, independently interpreting results, communicating results to patient.      F/u in two months.

## 2024-07-01 ENCOUNTER — TELEPHONE (OUTPATIENT)
Dept: FAMILY MEDICINE CLINIC | Facility: CLINIC | Age: 29
End: 2024-07-01

## 2024-07-01 DIAGNOSIS — E66.01 MORBID OBESITY (HCC): Primary | ICD-10-CM

## 2024-07-01 NOTE — TELEPHONE ENCOUNTER
Patient called, states prior authorization is needed for the following medication    SEMAGLUTIDE-WEIGHT MANAGEMENT 1.7 MG/0.75ML Subcutaneous Solution Auto-injector     [id number] - [place], [place] - [address] AT [place] [address], [phone], [phone] [[id number]]     Thank you.

## 2024-07-02 NOTE — TELEPHONE ENCOUNTER
Called patient and she notes her last dose was 9 months ago  She was on 1.7mg  Patient states she changed insurance and her new insurance covers mounjaro and this needs a PA  Patient asking for a prescription for mounjaro  LOV 6/27/24  Please advise

## 2024-07-03 RX ORDER — TIRZEPATIDE 2.5 MG/.5ML
2.5 INJECTION, SOLUTION SUBCUTANEOUS WEEKLY
Qty: 2 ML | Refills: 0 | Status: SHIPPED | OUTPATIENT
Start: 2024-07-03 | End: 2024-07-25

## 2024-07-03 NOTE — TELEPHONE ENCOUNTER
Called patient and explained mounjaro may be denied due to lack of recent office notes supporting weight loss reasons.   Patient asked if RX could be sent and requested appointment to be scheduled to support reason behind mounjaro RX.  VV scheduled for 7/11/24

## 2024-07-11 ENCOUNTER — LAB ENCOUNTER (OUTPATIENT)
Dept: LAB | Age: 29
End: 2024-07-11
Attending: FAMILY MEDICINE
Payer: COMMERCIAL

## 2024-07-11 ENCOUNTER — VIRTUAL PHONE E/M (OUTPATIENT)
Dept: FAMILY MEDICINE CLINIC | Facility: CLINIC | Age: 29
End: 2024-07-11
Payer: COMMERCIAL

## 2024-07-11 DIAGNOSIS — O24.410 DIET CONTROLLED GESTATIONAL DIABETES MELLITUS (GDM) IN FIRST TRIMESTER (HCC): ICD-10-CM

## 2024-07-11 DIAGNOSIS — O24.410 DIET CONTROLLED GESTATIONAL DIABETES MELLITUS (GDM) IN FIRST TRIMESTER (HCC): Primary | ICD-10-CM

## 2024-07-11 DIAGNOSIS — I10 PRIMARY HYPERTENSION: ICD-10-CM

## 2024-07-11 LAB
ALBUMIN SERPL-MCNC: 3.6 G/DL (ref 3.4–5)
ALBUMIN/GLOB SERPL: 1 {RATIO} (ref 1–2)
ALP LIVER SERPL-CCNC: 102 U/L
ALT SERPL-CCNC: 28 U/L
ANION GAP SERPL CALC-SCNC: 8 MMOL/L (ref 0–18)
AST SERPL-CCNC: 12 U/L (ref 15–37)
BILIRUB SERPL-MCNC: 0.4 MG/DL (ref 0.1–2)
BUN BLD-MCNC: 10 MG/DL (ref 9–23)
CALCIUM BLD-MCNC: 9 MG/DL (ref 8.5–10.1)
CHLORIDE SERPL-SCNC: 107 MMOL/L (ref 98–112)
CO2 SERPL-SCNC: 23 MMOL/L (ref 21–32)
CREAT BLD-MCNC: 0.94 MG/DL
EGFRCR SERPLBLD CKD-EPI 2021: 85 ML/MIN/1.73M2 (ref 60–?)
EST. AVERAGE GLUCOSE BLD GHB EST-MCNC: 123 MG/DL (ref 68–126)
FASTING STATUS PATIENT QL REPORTED: NO
GLOBULIN PLAS-MCNC: 3.5 G/DL (ref 2.8–4.4)
GLUCOSE BLD-MCNC: 131 MG/DL (ref 70–99)
HBA1C MFR BLD: 5.9 % (ref ?–5.7)
OSMOLALITY SERPL CALC.SUM OF ELEC: 287 MOSM/KG (ref 275–295)
POTASSIUM SERPL-SCNC: 3.9 MMOL/L (ref 3.5–5.1)
PROT SERPL-MCNC: 7.1 G/DL (ref 6.4–8.2)
SODIUM SERPL-SCNC: 138 MMOL/L (ref 136–145)

## 2024-07-11 PROCEDURE — 80053 COMPREHEN METABOLIC PANEL: CPT | Performed by: FAMILY MEDICINE

## 2024-07-11 PROCEDURE — 83036 HEMOGLOBIN GLYCOSYLATED A1C: CPT | Performed by: FAMILY MEDICINE

## 2024-07-11 PROCEDURE — 99443 PHONE E/M BY PHYS 21-30 MIN: CPT | Performed by: FAMILY MEDICINE

## 2024-07-11 NOTE — PROGRESS NOTES
Sharlene Solo verbally consents to a Virtual/Telephone Check-In service on 07/11/24.    Time spent: 21 min        CC: Obesity, gestational DM.     HPI:  Pt presents for obesity problem.  Pt has it for years.  Risk factors: sedentary life style.  Makes better: low calories diet and physical activities.  Makes worse: sugar craving and no exercise.  Pt has no following issues:  Cushing disease, thyroid disease, PCOS, sleep apnea, DM.  Associate symptoms: excessive weight, inability to loose weights.  No Fx of pancreatic ca or thyroid ca.  Patient presents for recheck of hypertension. Pt has been taking medications as instructed, no medication side effects, home BP monitoring in the range of 110-120's systolic and 70-80's diastolic.         Current Outpatient Medications   Medication Sig Dispense Refill    Insulin Pen Needle 30G X 5 MM Does not apply Misc Use a new needle for each insulin injection (Patient not taking: Reported on 6/27/2024) 100 each 1    Tirzepatide (MOUNJARO) 2.5 MG/0.5ML Subcutaneous Solution Pen-injector Inject 2.5 mg into the skin once a week for 4 doses. 2 mL 0    mupirocin 2 % External Ointment Apply 1 Application topically 3 (three) times daily. 30 g 3    levoFLOXacin (LEVAQUIN) 500 MG Oral Tab Take 1 tablet (500 mg total) by mouth daily. 7 tablet 0    nebivolol (BYSTOLIC) 10 MG Oral Tab Take 1 tablet (10 mg total) by mouth daily. Stop Labetolol 30 tablet 3    ALPRAZolam (XANAX) 0.25 MG Oral Tab Take 1 tablet (0.25 mg total) by mouth 2 (two) times daily as needed. 60 tablet 5    Norelgestromin-Eth Estradiol 150-35 MCG/24HR Transdermal Patch Weekly Place 1 patch onto the skin once a week. 3 patch 11    HYDROcodone-acetaminophen 5-325 MG Oral Tab Take 1 tablet by mouth every 6 (six) hours as needed. (Patient not taking: Reported on 6/27/2024) 30 tablet 0    labetalol 200 MG Oral Tab Take 1 tablet (200 mg total) by mouth 3 (three) times daily. 90 tablet 2    Sharps Container Does not apply  Misc Placed use insulin needles in the sharps container (Patient not taking: Reported on 2024) 1 each 1    Prenatal Vit w/Nn-Wvlapnhaw-CR (PNV OR) Take by mouth daily. (Patient not taking: Reported on 2024)        Past Medical History:    ADHD (attention deficit hyperactivity disorder)     NO MEDS DURING PREG    Allergic rhinitis, cause unspecified    Anxiety    Bipolar 1 disorder (HCC)    NO MEDS DURING PREG    Bronchitis, not specified as acute or chronic    Essential hypertension    Gestational diabetes (HCC)    Infectious mononucleosis    Intractable vomiting with nausea, unspecified vomiting type    Otitis    Pneumonia    Pneumonia, organism unspecified(486)    as      Swollen tonsil      Patient Active Problem List   Diagnosis    Dysmenorrhea    BMI 40.0-44.9, adult (HCC)    Abnormal complete blood count    Snoring    Tonsillar enlargement    Acute bronchitis with bronchospasm    Encounter for therapeutic drug monitoring    Depo contraception    Well woman exam with routine gynecological exam    Morbid obesity due to excess calories (HCC)    Pneumonia    Anxiety    Morbid obesity (HCC)    Swelling of joint of left knee    Acne vulgaris    Neck pain on left side    Colitis    Leukocytosis, unspecified type    Intractable diarrhea    Intractable vomiting with nausea, unspecified vomiting type    Salmonella enteritis    Hypokalemia    Leukocytosis     deliv NOS-unsp         REVIEW OF SYSTEMS:   GEN: no fatigue  RESPIRATORY: denies shortness of breath with exertion or rest  CARDIOVASCULAR: denies chest pain on exertion, palpitations.  SKIN: no acne, no skin infections, no fungal infections of the skin.  ENDO: no exercise thirst, polyphagia, no excessive sweating. No sexual dysfunction.  PSYCH: no low self est. No depression.No anxiety    EXAM:   LMP 2023 (Exact Date)   GENERAL: well developed, well nourished,in no apparent distress  HEENT: atraumatic, normocephalic, throat is clear,  PERRL  LUNGS: clear to auscultation  CARDIO: RRR without murmur  VS: no edema of the legs.  GI: good BS's, obese abdomen, no organomegaly.  PSYCH: normal mood, affect.    Orders Placed This Encounter   Procedures    Hemoglobin A1C     Standing Status:   Future     Standing Expiration Date:   7/11/2025    Comp Metabolic Panel (14)     Standing Status:   Future     Standing Expiration Date:   7/11/2025        ASSESSMENT AND PLAN:   Diagnoses and all orders for this visit:    Diet controlled gestational diabetes mellitus (GDM) in first trimester (HCC)  -     Hemoglobin A1C; Future  -     Comp Metabolic Panel (14); Future    Primary hypertension  -     Hemoglobin A1C; Future  -     Comp Metabolic Panel (14); Future       Obesity - Discussed BMI target. Discussed calorie counting, diet, exercise, and one pound of weight loss per week by decreasing calorie intake by 500 calories per day.Handout given.  Patient understood above plan and agreed.  All side effect of the medication d/w the pt, risk of pancreatitis, thyroid cancer risk, pancreatic cancer risk d/w the pt.

## 2024-07-12 RX ORDER — TIRZEPATIDE 2.5 MG/.5ML
2.5 INJECTION, SOLUTION SUBCUTANEOUS WEEKLY
Qty: 4 ML | Refills: 5 | Status: SHIPPED | OUTPATIENT
Start: 2024-07-12

## 2024-07-22 ENCOUNTER — TELEPHONE (OUTPATIENT)
Dept: FAMILY MEDICINE CLINIC | Facility: CLINIC | Age: 29
End: 2024-07-22

## 2024-07-22 DIAGNOSIS — E66.01 MORBID OBESITY (HCC): ICD-10-CM

## 2024-07-22 NOTE — TELEPHONE ENCOUNTER
Dr DEYSI Mike will not be covered as patient is not diabetic.  Can something else be sent instead?  thanks

## 2024-07-22 NOTE — TELEPHONE ENCOUNTER
Patient called regarding the following medication that needs a Prior Authorization    Tirzepatide (MOUNJARO) 2.5 MG/0.5ML Subcutaneous Solution Pen-injector     Patient states insurance company told her it has not been sent.      Please Advise.    Thank you.

## 2024-07-23 RX ORDER — TIRZEPATIDE 2.5 MG/.5ML
2.5 INJECTION, SOLUTION SUBCUTANEOUS WEEKLY
Qty: 2 ML | Refills: 0 | Status: SHIPPED | OUTPATIENT
Start: 2024-07-23 | End: 2024-07-29

## 2024-07-26 NOTE — TELEPHONE ENCOUNTER
[facility] to see [facility], tell pt we can start there      [facility]  [address] [facility], [address]  [place] [address]  Phone:  [phone]  Fax:  [phone]  Email:  [email or web address]

## 2024-07-26 NOTE — TELEPHONE ENCOUNTER
Mounjaro PA denied. I attempted to do PA for Zepbound & was unable to because medication is not covered by pt's plan. Please advise on next step.

## 2024-07-29 ENCOUNTER — PATIENT MESSAGE (OUTPATIENT)
Dept: FAMILY MEDICINE CLINIC | Facility: CLINIC | Age: 29
End: 2024-07-29

## 2024-07-29 RX ORDER — TIRZEPATIDE 2.5 MG/.5ML
2.5 INJECTION, SOLUTION SUBCUTANEOUS WEEKLY
Qty: 2 ML | Refills: 1 | Status: SHIPPED | OUTPATIENT
Start: 2024-07-29

## 2024-07-29 NOTE — TELEPHONE ENCOUNTER
Pt called, Zepbound PA was denied. She was confused regarding Mounjaro not being covered since she is a diabetic. I re-sent Mounjaro Rx as previously ordered to pharmacy & did a Prior auth, it was approved. I informed pt that Mounjaro was approved & to contact [facility] to have them fill med for her. Discussed side effects & to contact office in 3-4 weeks with how she is doing on medication. All questions answered, pt expresses understanding.

## 2024-07-30 NOTE — TELEPHONE ENCOUNTER
Pt wanting to confirm that she should no longer be taking insulin since they stopped that after she gave birth & just taking Mounjaro now. Pt asking how long she would be considered a diabetic if her A1C shows Pre-diabetic range now?

## 2024-07-30 NOTE — TELEPHONE ENCOUNTER
From: Sharlene Solo  To: Rakel Miner  Sent: 7/29/2024 12:26 PM CDT  Subject: Question regarding bloodwork & diabetes.     Hello! Since I had GD when I was pregnant and with my test results now, do I still have diabetes? Should I be taking insulin? I stopped after giving birth. If someone could shoot me a me a call I would greatly appreciate it.

## 2024-10-01 ENCOUNTER — VIRTUAL PHONE E/M (OUTPATIENT)
Dept: FAMILY MEDICINE CLINIC | Facility: CLINIC | Age: 29
End: 2024-10-01
Payer: COMMERCIAL

## 2024-10-01 DIAGNOSIS — E66.01 MORBID OBESITY (HCC): Primary | ICD-10-CM

## 2024-10-01 PROCEDURE — 99442 PHONE E/M BY PHYS 11-20 MIN: CPT | Performed by: FAMILY MEDICINE

## 2024-10-01 RX ORDER — TIRZEPATIDE 5 MG/.5ML
0.5 INJECTION, SOLUTION SUBCUTANEOUS WEEKLY
Qty: 2 ML | Refills: 2 | Status: SHIPPED | OUTPATIENT
Start: 2024-10-01

## 2024-10-01 NOTE — PROGRESS NOTES
Sharlene Barakatbic verbally consents to a Virtual/Telephone Check-In service on 10/01/24.    Time spent: 12 min            CC: weight loss f/u       HPI:  Pt lost  10 lbs. Pt exercises 3 times a week, watches carefully healthy diet.  No chest pains, SOB, no palpitations. Tolerates medication very well. Mild nausea, no vomiting, pt noticed less apatite. No bloating, no abdominal pain.        Current Outpatient Medications   Medication Sig Dispense Refill    Tirzepatide (MOUNJARO) 5 MG/0.5ML Subcutaneous Solution Pen-injector Inject 0.5 mL into the skin once a week. 2 mL 2    Tirzepatide (MOUNJARO) 2.5 MG/0.5ML Subcutaneous Solution Pen-injector Inject 2.5 mg into the skin once a week. 2 mL 1    mupirocin 2 % External Ointment Apply 1 Application topically 3 (three) times daily. 30 g 3    nebivolol (BYSTOLIC) 10 MG Oral Tab Take 1 tablet (10 mg total) by mouth daily. Stop Labetolol 30 tablet 3    ALPRAZolam (XANAX) 0.25 MG Oral Tab Take 1 tablet (0.25 mg total) by mouth 2 (two) times daily as needed. 60 tablet 5    Norelgestromin-Eth Estradiol 150-35 MCG/24HR Transdermal Patch Weekly Place 1 patch onto the skin once a week. 3 patch 11    HYDROcodone-acetaminophen 5-325 MG Oral Tab Take 1 tablet by mouth every 6 (six) hours as needed. (Patient not taking: Reported on 2024) 30 tablet 0    labetalol 200 MG Oral Tab Take 1 tablet (200 mg total) by mouth 3 (three) times daily. 90 tablet 2      Past Medical History:    ADHD (attention deficit hyperactivity disorder)     NO MEDS DURING PREG    Allergic rhinitis, cause unspecified    Anxiety    Bipolar 1 disorder (HCC)    NO MEDS DURING PREG    Bronchitis, not specified as acute or chronic    Essential hypertension    Gestational diabetes (HCC)    Infectious mononucleosis    Intractable vomiting with nausea, unspecified vomiting type    Otitis    Pneumonia    Pneumonia, organism unspecified(486)    as      Swollen tonsil      Patient Active Problem List   Diagnosis     Dysmenorrhea    BMI 40.0-44.9, adult (HCC)    Abnormal complete blood count    Snoring    Tonsillar enlargement    Acute bronchitis with bronchospasm    Encounter for therapeutic drug monitoring    Depo contraception    Well woman exam with routine gynecological exam    Morbid obesity due to excess calories (HCC)    Pneumonia    Anxiety    Morbid obesity (HCC)    Swelling of joint of left knee    Acne vulgaris    Neck pain on left side    Colitis    Leukocytosis, unspecified type    Intractable diarrhea    Intractable vomiting with nausea, unspecified vomiting type    Salmonella enteritis    Hypokalemia    Leukocytosis     deliv NOS-unsp         REVIEW OF SYSTEMS:   RESPIRATORY: denies shortness of breath with exertion  CARDIOVASCULAR: denies chest pain on exertion, palpitations.  SKIN: no acne  PSYCH: no low self est. No depression.    EXAM:   LMP 2023 (Exact Date)   GENERAL: in no apparent distress  HEENT: normal voice  LUNGS: no SOB  PSYCH: normal mood.          ASSESSMENT and PLAN:   Diagnoses and all orders for this visit:    Morbid obesity (HCC)    Other orders  -     Tirzepatide (MOUNJARO) 5 MG/0.5ML Subcutaneous Solution Pen-injector; Inject 0.5 mL into the skin once a week.          Discussed BMI target. Discussed calorie counting, diet, exercise. Handout given.  Patient understood above plan and agreed.  All side effect of the medication d/w the pt,nausea, vomiting, lowering blood sugar, higher risk of pancreatitis, d/w the pt.  F/u in 2 months.

## 2024-11-11 RX ORDER — NEBIVOLOL 10 MG/1
10 TABLET ORAL DAILY
Qty: 90 TABLET | Refills: 1 | Status: SHIPPED | OUTPATIENT
Start: 2024-11-11

## 2024-12-09 ENCOUNTER — NURSE TRIAGE (OUTPATIENT)
Dept: FAMILY MEDICINE CLINIC | Facility: CLINIC | Age: 29
End: 2024-12-09

## 2024-12-09 RX ORDER — VALACYCLOVIR HYDROCHLORIDE 1 G/1
1000 TABLET, FILM COATED ORAL EVERY 8 HOURS
Qty: 21 TABLET | Refills: 0 | Status: SHIPPED | OUTPATIENT
Start: 2024-12-09 | End: 2024-12-16

## 2024-12-09 NOTE — TELEPHONE ENCOUNTER
Action Requested: Summary for Provider     []  Critical Lab, Recommendations Needed  [] Need Additional Advice  []   FYI    []   Need Orders  [x] Need Medications Sent to Pharmacy  []  Other     SUMMARY: Dr. Miner- can you send oral valacyclovir?     Received call from pt. Patient developed cold sore on her lip yesterday, stated this is recurrent problem for her and Dr. Miner usually sends oral valacyclovir. Patient asking if Dr. Miner can send rx.     Reason for call: Refill Request and Acute  Onset: yesterday 12/8      Reason for Disposition   Herpes sores are a recurrent problem, and caller wants a prescription medicine to take the next time they occur    Protocols used: Cold Sores - Fever Blisters of Lip-A-OH

## 2024-12-23 NOTE — ED NOTES
Pt c/o not feeling well, head congestion, headache, says her child at home is sick also.  md in room, orders received for toradol
Assistance with ambulation/Assistance OOB with selected safe patient handling equipment/Communicate Risk of Fall with Harm to all staff/Reinforce activity limits and safety measures with patient and family/Tailored Fall Risk Interventions/Visual Cue: Yellow wristband and red socks/Bed in lowest position, wheels locked, appropriate side rails in place/Call bell, personal items and telephone in reach/Instruct patient to call for assistance before getting out of bed or chair/Non-slip footwear when patient is out of bed/[place] to call system/Physically safe environment - no spills, clutter or unnecessary equipment/Purposeful Proactive Rounding/Room/bathroom lighting operational, light cord in reach
Normal and symmetric appearance/Without webbing/Without masses/Clavicles of normal shape, contour & nontender on palpation

## 2024-12-27 DIAGNOSIS — F41.9 ANXIETY: ICD-10-CM

## 2024-12-30 RX ORDER — ALPRAZOLAM 0.25 MG/1
0.25 TABLET ORAL 2 TIMES DAILY PRN
Qty: 60 TABLET | Refills: 0 | Status: SHIPPED | OUTPATIENT
Start: 2024-12-30

## 2025-01-08 RX ORDER — VALACYCLOVIR HYDROCHLORIDE 1 G/1
1000 TABLET, FILM COATED ORAL EVERY 8 HOURS
Qty: 21 TABLET | Refills: 0 | Status: SHIPPED | OUTPATIENT
Start: 2025-01-08

## 2025-01-13 RX ORDER — VALACYCLOVIR HYDROCHLORIDE 1 G/1
1000 TABLET, FILM COATED ORAL EVERY 8 HOURS
Qty: 21 TABLET | Refills: 0 | OUTPATIENT
Start: 2025-01-13

## 2025-01-16 RX ORDER — VALACYCLOVIR HYDROCHLORIDE 1 G/1
1000 TABLET, FILM COATED ORAL EVERY 8 HOURS
Qty: 21 TABLET | Refills: 0 | Status: SHIPPED | OUTPATIENT
Start: 2025-01-16

## 2025-02-07 DIAGNOSIS — F41.9 ANXIETY: ICD-10-CM

## 2025-02-11 RX ORDER — ALPRAZOLAM 0.25 MG
0.25 TABLET ORAL 2 TIMES DAILY PRN
Qty: 60 TABLET | Refills: 0 | Status: SHIPPED | OUTPATIENT
Start: 2025-02-11

## 2025-02-20 LAB
HEPATITIS B SURFACE ANTIGEN OB: NEGATIVE
HIV RESULT OB: NEGATIVE
RAPID PLASMA REAGIN OB: NONREACTIVE

## 2025-03-04 RX ORDER — VALACYCLOVIR HYDROCHLORIDE 1 G/1
1000 TABLET, FILM COATED ORAL EVERY 8 HOURS
Qty: 21 TABLET | Refills: 0 | Status: SHIPPED | OUTPATIENT
Start: 2025-03-04

## 2025-03-05 RX ORDER — VALACYCLOVIR HYDROCHLORIDE 1 G/1
1000 TABLET, FILM COATED ORAL EVERY 8 HOURS
Qty: 21 TABLET | Refills: 0 | OUTPATIENT
Start: 2025-03-05

## 2025-04-07 ENCOUNTER — TELEMEDICINE (OUTPATIENT)
Facility: CLINIC | Age: 30
End: 2025-04-07
Payer: COMMERCIAL

## 2025-04-07 DIAGNOSIS — O24.410 DIET CONTROLLED GESTATIONAL DIABETES MELLITUS (GDM) IN THIRD TRIMESTER (HCC): Primary | ICD-10-CM

## 2025-04-07 NOTE — PATIENT INSTRUCTIONS
Blood Glucose Goals    Start checking your blood sugars 4 times/day:  1.) Fasting (before your first meal of the day)  2.) 2 hours after breakfast  3.) 2 hours after lunch  4.) 2 hours after dinner    Bring your recorded glucose log sheet and food log sheet to your follow up visit in 2 weeks for review.    Ranges:   Fasting: less than 95 mg/dL  2 hours after meal: less than 120 mg/dL    Food Goals    Healthy, well rounded diet with plenty of water.    At least 175 grams of carbohydrates a day.    If 3 full meals is too much for you, aim for small frequent snacks.     Avoid going longer than 5 hours between meals/snacks.    Meal Plan:  Spreading out our carbohydrates throughout the day and adding in small amounts of movement after meals can help us deal with the glucose better.     Breakfast: 30 grams of carbohydrates.  AM Snack: 15 grams of carbohydrates.  Lunch: 45 grams of carbohydrates.  PM Snack: 15 grams of carbohydrates.  Dinner: 45 grams of carbohydrates.  Bedtime Snack: 30 grams of carbohydrates.    Kisha Wills, RD, , LD, CDCES(she/her/hers)  Diabetes Educator  [facility]  [email or web address]  [phone] phone  [phone] Fax

## 2025-04-07 NOTE — PROGRESS NOTES
Sharlene Solo   1995 was seen for Gestational Diabetes Counseling: Individual    Date: 2025  Referring Provider: Colby Whitehead DO Start time: 11:00am End time: 11:45am    This visit is conducted using Telemedicine with live, interactive audio and video.  Patient verbally consents to Telemedicine visit.  Patient understands and accepts financial responsibility for any deductible, co-insurance and/or co-pays associated with this service.        Assessment: LMP 2023 (Exact Date)   Weight:   Wt Readings from Last 6 Encounters:   24 285 lb   24 284 lb   24 286 lb   24 284 lb   24 281 lb   24 281 lb         Ms. Solo presents today for initial gestational diabetes education. She has had some morning sickness but otherwise has been fine. Cut out soda last time.     Estimated Date of Delivery: 2nd trimester   Gestation: Unknown  First trimester: 1-13  Second trimester: 13-27  Third trimester: 28-40    History:     OB History    Para Term  AB Living   5 2 1 1 2 2   SAB IAB Ectopic Multiple Live Births   2 0 0 0 2        GDM Screen:   No results found for: \"GLUFG\", \"GLU1G\", \"GLU2G\", \"GLU3G\"     GLUCOSE 1HR OB   Date Value Ref Range Status   2014 176 mg/dL Final     Comment:     Target level of <130 identifies 90% of women with GDM  according to ADA 2006 guidelines  [address],[address]          History of GDM:  Yes needed insulin at the end   Family history of Type 2 Diabetes: yes    Diet & Exercise:     Obtained usual diet history:      TYPICAL  FOOD  NOTES   Breakfast  bagel with cream cheese       Lunch  skip         Dinner  chicken stir aviles         Snacks  Yogurt, chips, hot dog with no bun     Popcorn, pbj     Beverages  water, milk with dinner or snack          Eating out  Limited, restaurant- monthly              Current physical activity: Walking with baby  Sleep: hard due to baby        Education:     GDM Overview:  Reviewed gestational diabetes as diagnosis including target blood glucose values.  Benefits, risks, and management options for improving/maintaining glucose control to mother/baby discussed.    Healthy Eating:  Discussed nutrition concepts for pregnancy/healthy eating and effects of food on BG value.  Timing of meals; what is a carbohydrate, protein, fat.  Taught: Carb counting, label reading, meal planning.  Suggested minimal carb intake of 175 gm.    Being Active:  Benefits and effects of activity on BG discussed.  Reviewed types of recommended activity, duration, precautions, and when to call MD.    Monitoring:  Instructed on how to use glucose monitor/proper lancet disposal. Checking schedules are:   Fastin-95 mg/dL, Call MD is >105 md/dL twice in 1 week   2 Hour Post Prandial:  Less than 120 md/dL, Call MD if >140 md/dL twice in 1 week.    Pt came in with a a glucose meter:  No  Instructed /demonstrated ability to perform blood glucose checking on: self      Taking Medication:  Reviewed when medication might be indicated.    Reducing Risk:  Effects of elevated blood glucose on mother/baby reviewed.  Discussed management (hyperglycemia, hypoglycemia, sick day, other) and when to call provider.  Post pregnancy management/prevention of Type 2 DM, and increased risk of having diabetes later in life reviewed.    Healthy Coping:  Family involvement/social support encouraged.  Identification of lifestyle behaviors willing to change discussed.    Training Tools Provided:   handout.  BG Log Sheets    Recommendations:      1. Follow recommended meal plan.   2. Begin checking fasting glucose and 2 hour after meals   3. Bring glucose / food log to next visit with diabetes educator. Bring glucose log sheets to MD office visits.   4. Encouraged activity if no restrictions.   5. Encouraged Sharlene to contact the diabetes center with any questions or concerns.    Glucose meter  kit, test strips and lancets sent to preferred pharmacy.    Patient verbalized understanding and has no further questions at this time.  Emailed/written materials provided for all topics covered.    Scheduled pt to follow-up x 1 with the Diabetes Center Visit date not found   Future Appointments   Date Time Provider Department Center   4/25/2025 11:30 AM Kisha Wills RD [facility]   5/19/2025 11:00 AM [facility] [id number] [facility]       Kisha Wills RD, , LD, CDCES

## 2025-04-13 RX ORDER — BLOOD-GLUCOSE METER
KIT MISCELLANEOUS
Qty: 100 STRIP | Refills: 3 | Status: SHIPPED | OUTPATIENT
Start: 2025-04-13

## 2025-04-13 RX ORDER — BLOOD-GLUCOSE METER
1 KIT MISCELLANEOUS 2 TIMES DAILY
Qty: 1 KIT | Refills: 0 | Status: SHIPPED | OUTPATIENT
Start: 2025-04-13 | End: 2026-04-13

## 2025-04-13 RX ORDER — LANCETS 28 GAUGE
EACH MISCELLANEOUS
Qty: 100 EACH | Refills: 3 | Status: SHIPPED | OUTPATIENT
Start: 2025-04-13

## 2025-04-25 ENCOUNTER — TELEMEDICINE (OUTPATIENT)
Facility: CLINIC | Age: 30
End: 2025-04-25
Payer: COMMERCIAL

## 2025-04-25 DIAGNOSIS — O24.410 DIET CONTROLLED GESTATIONAL DIABETES MELLITUS (GDM) IN THIRD TRIMESTER (HCC): Primary | ICD-10-CM

## 2025-04-25 NOTE — PROGRESS NOTES
Sharlene Solo  : 1995 was seen for GDM  Individual Follow-Up Counseling    Date: 2025  Referring Provider: Colby Whitehead DO    Start time: 11:30am End time: 11:45am    This visit is conducted using Telemedicine with live, interactive audio and video.  Patient verbally consents to Telemedicine visit.  Patient understands and accepts financial responsibility for any deductible, co-insurance and/or co-pays associated with this service.        Assessment: LMP 2023 (Exact Date)   Weight:   Wt Readings from Last 6 Encounters:   24 285 lb   24 284 lb   24 286 lb   24 284 lb   24 281 lb   24 281 lb       Ms. Solo presents today for follow up gestational diabetes education. She fasting is 107-128. Post meal are good. Active, drinking water, waking up with one year old 3x per night. Not doing a bedtime snack and suggested trialing this. Did not share blood sugar log.     Estimated Date of Delivery: None noted.   Gestation:Unknown  First trimester: 1-13  Second trimester: 13-27  Third trimester: 28-40    History:     OB History    Para Term  AB Living   5 2 1 1 2 2   SAB IAB Ectopic Multiple Live Births   2 0 0 0 2        GDM Screen:   No results found for: \"GLUFG\", \"GLU1G\", \"GLU2G\", \"GLU3G\"     GLUCOSE 1HR OB   Date Value Ref Range Status   2014 176 mg/dL Final     Comment:     Target level of <130 identifies 90% of women with GDM  according to ADA 2006 guidelines  [facility], [address] [place] [address]          History of GDM:  Yes  Family history of Type 2 Diabetes: yes    Blood Glucose Levels:      Lowest Highest Average Amount out of target   Fasting     mg/dL   mg/dL   mg/dL   readings/ total   2hr post Breakfast     mg/dL   mg/dL   mg/dL   readings/ total   2hr post Lunch     mg/dL   mg/dL   mg/dL   readings/ total   2hr post Dinner     mg/dL   mg/dL   mg/dL   readings/ total     She brought in BG log.  Reviewed in detail with patient. Copies made and sent to scan. Provided copy to patient. .    Diet & Exercise:     Obtained diet history from last 2 weeks:     TYPICAL  FOOD  NOTES   Breakfast           Lunch           Dinner           Snacks             Following meal plan: yes  Skips: none  Meals are balanced yes.  Carb Intake is adequate.  Food Selections are appropriate.  Drinking plenty of water yes.    Current physical activity: walking daily.    Current sleep patterns:  waking up multiple times per night with 1 year old    Education:     GDM Overview:  Reviewed target blood glucose values for GDM.  Discussed benefits/risks to mother/baby management options to improve/maintain glucose control.     Healthy Eating:  Reviewed/Reinforced:  Nutrition concepts for pregnancy/healthy eating and effect of food on blood glucose.  Meal planning process and benefits of pre-planning meals/snacks.  Appropriate timing of meals/snacks.  Carb counting.  4 servings of calcium while pregnant/breastfeeding.  Additional concepts:     Being Active:  Reviewed benefits of effects of activity on BG values.  Reviewed types of activity, duration, precautions.    Monitoring:  Instructed to report readings to MD as directed.  Call MD: if fasting blood glucose is > 95 twice in 1 week. If 2 hr postprandial is > 120 twice in 1 week at any one meal.    Taking Medication:  Reviewed appropriate timing (if on insulin) of meds.   Reviewed probability of needing medication adjustments throughout pregnancy.     Reducing Risk:  Discussed management of (hyperglycemia, hypoglycemia) and when to call provider.    Recommendations:      1. Follow recommended meal plan.   2. Test blood glucose and ketones as directed.    3. Bring glucose log to each MD visit.   4. Start/continue activity if no restrictions.    5. Additional recommendations:     Patient verbalized understanding and has no further questions at this time.    Kisha Wills, RD, , LD, CDCES

## 2025-05-12 NOTE — PATIENT INSTRUCTIONS
Blood Glucose Goals     Continue checking your blood sugars 4 times/day:  1.) Fasting (before your first meal of the day)  2.) 2 hours after breakfast  3.) 2 hours after lunch  4.) 2 hours after dinner     Ranges:   Fasting: less than 95 mg/dL  2 hours after meal: less than 120 mg/dL     Let your OB know if:  - your fasting is more than 95 mg/dL 2x in a week  - your 2 hours after a meal is more than 120 mg/dL 2x in a week     Food Goals     Healthy, well rounded diet with plenty of water.    At least 175 grams of carbohydrates a day.   70 g protein daily  28 grams of fiber daily   96 fl oz liquid   3rd trimester + 450 kcal  If 3 full meals is too much for you, aim for small frequent snacks.      Your prenatal should contain: 400 micrograms folic acid/ folate, B12, Iron, Vitamin D, Omega 3 DHA, Choline    Aim for 4x servings of calcium per day. This will help with your baby's development. Foods with calcium include:  - Dairy products (milk, yogurt, cheese)  - Oranges and calcium added orange juice  - Kale, maryann greens, broccoli  - Dried figs, papayas, and bananas  - Seeds: Chris, sesame  - Beans and lentils  - Almonds  - Rhubarb  - Fortified foods and drinks (cereals, non-dairy milks)  - Tofu  - Edamame    Kisha Wills, RD, , LD, CDCES(she/her/hers)  Diabetes Educator  [facility]  [email or web address]  [phone] phone  [phone] Fax

## 2025-05-19 ENCOUNTER — ULTRASOUND ENCOUNTER (OUTPATIENT)
Dept: PERINATAL CARE | Facility: HOSPITAL | Age: 30
End: 2025-05-19
Attending: OBSTETRICS & GYNECOLOGY
Payer: COMMERCIAL

## 2025-05-19 VITALS
HEIGHT: 66 IN | BODY MASS INDEX: 47.09 KG/M2 | HEART RATE: 98 BPM | SYSTOLIC BLOOD PRESSURE: 151 MMHG | DIASTOLIC BLOOD PRESSURE: 88 MMHG | WEIGHT: 293 LBS

## 2025-05-19 DIAGNOSIS — E66.01 MORBID OBESITY DUE TO EXCESS CALORIES (HCC): ICD-10-CM

## 2025-05-19 DIAGNOSIS — O24.414 INSULIN CONTROLLED GESTATIONAL DIABETES MELLITUS (GDM) IN SECOND TRIMESTER (HCC): Primary | ICD-10-CM

## 2025-05-19 DIAGNOSIS — O24.112 TYPE 2 DIABETES MELLITUS AFFECTING PREGNANCY IN SECOND TRIMESTER, ANTEPARTUM (HCC): ICD-10-CM

## 2025-05-19 DIAGNOSIS — O99.213 MATERNAL MORBID OBESITY, ANTEPARTUM, THIRD TRIMESTER (HCC): Primary | ICD-10-CM

## 2025-05-19 DIAGNOSIS — O16.2 HYPERTENSION AFFECTING PREGNANCY IN SECOND TRIMESTER (HCC): ICD-10-CM

## 2025-05-19 DIAGNOSIS — E66.01 MATERNAL MORBID OBESITY IN SECOND TRIMESTER, ANTEPARTUM (HCC): ICD-10-CM

## 2025-05-19 DIAGNOSIS — O16.9 HYPERTENSION AFFECTING PREGNANCY (HCC): ICD-10-CM

## 2025-05-19 DIAGNOSIS — O99.213 MATERNAL MORBID OBESITY, ANTEPARTUM, THIRD TRIMESTER (HCC): ICD-10-CM

## 2025-05-19 DIAGNOSIS — E66.01 MATERNAL MORBID OBESITY, ANTEPARTUM, THIRD TRIMESTER (HCC): ICD-10-CM

## 2025-05-19 DIAGNOSIS — E66.01 MORBID OBESITY (HCC): ICD-10-CM

## 2025-05-19 DIAGNOSIS — O24.419 GESTATIONAL DIABETES MELLITUS (GDM) IN THIRD TRIMESTER, GESTATIONAL DIABETES METHOD OF CONTROL UNSPECIFIED (HCC): ICD-10-CM

## 2025-05-19 DIAGNOSIS — O99.212 MATERNAL MORBID OBESITY IN SECOND TRIMESTER, ANTEPARTUM (HCC): ICD-10-CM

## 2025-05-19 DIAGNOSIS — E66.01 MATERNAL MORBID OBESITY, ANTEPARTUM, THIRD TRIMESTER (HCC): Primary | ICD-10-CM

## 2025-05-19 PROCEDURE — 76811 OB US DETAILED SNGL FETUS: CPT | Performed by: OBSTETRICS & GYNECOLOGY

## 2025-05-19 RX ORDER — CHOLECALCIFEROL (VITAMIN D3) 25 MCG
1 TABLET,CHEWABLE ORAL DAILY
COMMUNITY

## 2025-05-19 NOTE — PROGRESS NOTES
Reason for Consult:   Dear Dr. Whitehead,    Thank you for requesting ultrasound evaluation and maternal fetal medicine consultation on Sharlene Solo.  As you are aware she is a 30 year old female with a Pat pregnancy .  A maternal-fetal medicine consultation was requested secondary to  obesity, hypertension and early onset GDM.  Her prenatal records and labs were reviewed.    On Labetalol 200mg TID.  Taking insulin managed by OB, Humulin N 7 U HS and Metformin 1000mg HS.  Review of History:     OB History:    OB History    Para Term  AB Living   5 2 1 1 2 2   SAB IAB Ectopic Multiple Live Births   2 0 0 0 2      # Outcome Date GA Lbr Matthew/2nd Weight Sex Type Anes PTL Lv   5 Current            4 SAB 2024 8w0d          3  24 35w4d  6 lb 14.1 oz (3.12 kg) M Caesarean Spinal N BERNARDO      Complications: Group B beta strep +, Gestational hypertension, Severe preeclampsia      Name: Vincent Solo      Apgar1: 5  Apgar5: 8   2 SAB 2023 7w0d          1 Term 14 39w1d  7 lb 3.3 oz (3.27 kg) M CS-LTranv EPI N BERNARDO      Complications: Failure to progress      Apgar1: 9  Apgar5: 9             Allergies:  Allergies[1]   Current Meds:  Current Medications[2]     HISTORY:  Past Medical History[3]   Past Surgical History[4]   Family History[5]   Short Social Hx on File[6]     NARRATIVE:     Ht 5' 6\" (1.676 m)   Wt 299 lb (135.6 kg)   LMP 2023 (Exact Date)   BMI 48.26 kg/m²           Alert and Oriented.  No acute distress          Abdomen:  soft, nontender, no contractions noted.           extremities:  nontender, no edema           DISCUSSION  During her visit we discussed and reviewed the following issues:    OBESITY:    Obesity during pregnancy is associated with numerous maternal and  risks.  It is not clear whether obesity is a direct cause of adverse pregnancy outcome or whether the association between obesity and adverse pregnancy outcome is due to factors such as  diabetes mellitus.   Data suggest that obese women should be encouraged to undertake a weight reduction program (diet, exercise, behavior modification, and possibly bariatric surgery in some cases) prior to attempting to conceive.            Subfertility in obese women is most commonly related to ovulatory dysfunction, and, in some obese women, the ovulatory dysfunction is related to polycystic ovary syndrome (PCOS). It is also important to note that even among ovulatory women, increasing obesity is associated with decreasing spontaneous pregnancy rates.  The increased risk of miscarriage in obese women may be because such women often have PCOS or isolated insulin resistance.                 Due to its strong association with obesity in the general population, type 2 diabetes mellitus is one of the two most common medical complications of the obese . The increased risk of type 2 diabetes is primarily related to an exaggerated increase in insulin resistance in the obese state. It is reasonable to screen obese gravidas for undiagnosed pregestational diabetes in the first trimester.   Glucose intolerance associated with gestational diabetes generally resolves postpartum; however, obese women with a history of gestational diabetes have a two-fold increased prevalence of subsequent type 2 diabetes.           An association between obesity and hypertensive disorders during pregnancy has been consistently reported.  In particular, maternal weight and BMI are independent risk factors for preeclampsia.             Studies have found that the increased risk of  birth in obese gravidas is primarily associated with obesity-related medical and  complications, rather than an intrinsic predisposition to spontaneous  birth. Prevention of  birth in these patients, therefore, should be directed toward prevention or management of medical and obstetrical complications.               Both  prepregnancy obesity and excessive maternal weight gain before or during pregnancy contribute to an increased probability of  delivery.  It has also been hypothesized that obesity may lead to dystocia due to increased soft tissue deposition in the maternal pelvis.    delivery in the obese  is associated with numerous perioperative concerns, including emergency delivery, prolonged incision to delivery interval, blood loss >1000 mL, longer operative times, wound infection, thromboembolism, and endometritis.            Maternal obesity appears to be associated with a small increase in the absolute rate of some congenital anomalies, and the risk may increase with increasing maternal weight.  The risk of neural tube defects increased significantly with maternal weight.    The analysis found that overweight and obese pregnant women experienced significantly more stillbirths than normal weight women.      Increase  testing and Level 2 Ultrasound is recommended.     -------------------  HYPERTENSION:         We discussed at length the potential implications associated with chronic hypertension.   I informed the patient that chronic hypertension increases the risk of pre-eclampsia, placental abruption, IUGR and fetal demise and possible need for  delivery.  The signs and symptoms of preeclampsia were discussed.   We also discussed the potential risks and benefits of low dose aspirin and anti-hypertensive medication.  She understands that a better diet, weight loss and routine exercise can help her blood pressure for the rest of her life.  We discussed the morbidity associated with hypertension including heart disease, strokes and kidney disease.         Prevention of Preeclampsia    Antiplatelet Agents  The observation that preeclampsia is associated with increased platelet turnover and increased platelet-derived thromboxane levels led to randomized trials evaluating low-dose aspirin  therapy in women thought to be at increased risk of the disease. As opposed to higher dose aspirin therapy, low-dose aspirin (60 to 150 mg per day) diminishes platelet thromboxane synthesis while maintaining vascular wall prostacyclin synthesis. Although not well studied, the beneficial effect of low-dose aspirin for prevention of preeclampsia may also be in part related to modulation of inflammation. The drug has been studied for both prevention of preeclampsia and prevention of progression from mild to severe disease. It appears to result in a modest reduction in risk of preeclampsia when given to women at moderate to high risk of preeclampsia.  This approach has been studied in over 35,000 women, for both prevention of preeclampsia and prevention of progression of the disease. Low dose aspirin has a modest impact on pregnancy outcome; it reduces the risk of preeclampsia, as well as other adverse pregnancy outcomes (eg,  delivery, fetal growth restriction) by about 10 to 20 percent. Low dose aspirin is safe in pregnancy; thus, it is a reasonable strategy in women with a moderate to high risk of preeclampsia in whom the benefits outweigh the risks.     Clinical Guidelines  Based on the available data, low-dose aspirin is advised for women at high risk for preeclampsia. There is no consensus on the criteria that confer high risk. The [place] Preventive Services Task Force (USPSTF) risk criteria are reasonable.  USPSTF criteria for high risk include one or more of the following:   Previous pregnancy with preeclampsia, especially early onset and with an adverse outcome   Multifetal gestation  Chronic hypertension   Type 1 or 2 diabetes mellitus  Chronic kidney disease  Autoimmune disease (antiphospholipid syndrome, systemic lupus erythematosus)     Women with multiple moderate risk factors for preeclampsia are considered high risk, as well. Identification of women with an appropriate combination of  moderate risk factors to be considered high risk is subjective and determined case by case, as the data describing the magnitude of the association between each of these risk factors and development of preeclampsia vary widely and lack consistency. USPSTF criteria for moderate risk include:  Nulliparity  Obesity (body mass index >30 kg/m2)  Family history of preeclampsia in mother or sister  Age >=35 years  Sociodemographic characteristics (, low socioeconomic level)  Personal risk factors (eg, history of low birth weight or small for gestational age, previous adverse pregnancy outcome, >10 year pregnancy interval)     If used, daily low-dose aspirin should be initiated in the 12th or 13th week of gestation, although adverse effects from earlier initiation (eg, preconception) have not been documented. The optimum low dose is unclear; 81 mg is readily available, but 100 or 150 mg (which are not available in some countries including the [place] may be more effective.  In some pregnant women, platelet function is not inhibited by the 81 mg dose but is altered by higher dosing. Hence, current evidence has suggested a daily dose of 100 to 150 mg of aspirin rather than a lower dose. In the [place], this can be achieved by taking one and one-half 81 mg tablets; however, taking one 81 mg tablet is also reasonable since this is the commercially available dose and has proven efficacy. For prevention of preeclampsia, no trials have evaluated the efficacy of a higher dose of aspirin, which could be achieved easily by taking two 81 mg tablets or splitting a 325 mg tablet. For prevention of myocardial infarction and stroke in nonpregnant individuals, aspirin appears to be equally effective at doses between 75 and 325 mg per day.  The safety of low-dose aspirin use in the second and third trimesters is well established, but questions linger regarding use in the first trimester (possible increase in minor  vaginal bleeding or gastroschisis). Early therapy (before 16 weeks) may be important since the pathophysiologic features of preeclampsia develop at this time, weeks before clinical disease is apparent. However, available evidence is inconsistent about the importance of early initiation of therapy, possibly because aspirin has major effects on prostacyclin production and endothelial function throughout gestation.  Aspirin is discontinued 5 to 10 days before expected delivery to diminish the risk of bleeding during delivery; however, no adverse maternal or fetal effects related to low-dose aspirin have been proven.  Major questions remain as to which, if any, subgroups of women are more likely to benefit from low-dose aspirin therapy, when treatment should be started (first or second trimester), and the optimum dose to inhibit placental PGH synthase (cyclooxygenase) and also allow the anti-inflammatory effects of low-dose aspirin.     ----------------------  DIABETES:    She did not bring glucose logs.  States all Fastings above 120.   Normal after meals.  Does not eat an HS snack.    She was informed of the potential implications and risks associated with diabetes to her and her unborn child, especially when poorly controlled. There is an increased risk for early pregnancy loss and miscarriage.  There is an increased risk for fetal structural abnormlity, most commonly of the heart and spine.  There is an increased risk of growth discrepancies, which can range from fetal macrosomia to IUGR associated with placental dysfunction.  We discussed potential birth complications associated with fetal macrosomia, including shoulder dystocia, and associated complications.  There is an increased risk of stillbirth as well.   We talked about the increased and associated risk of fetal hyperinsulinemia, jaundice, electrolyte imbalance, seizure activity, IUFD and adverse outcome.  There is also an increased risk of preeclampsia,  especially in proteinuria is present.  The importance of good glycemic control and avoidance of prolonged hypo- and hyperglycemia was addressed.       In general good control of blood sugars can help to decrease these risks to a level close to non diabetic patients.    We discussed optimal contol of blood sugars would be achieved with fasting levels between 60 - 95, and 1 hour postptandial values of less than 140, or 2 hour postprandial values of less than 120.  Blood sugars should be check at least 4 times daily.        OB ULTRASOUND REPORT   See imaging tab for complete ultrasound report or in PACS    Single IUP in transverse presentation.    Placenta is posterior.   A 3 vessel cord is noted.  Cardiac activity is present at 155 bpm   g ( 1 lb 0 oz);   MVP is 3.9 cm .         Fetal Anatomy:  Visualized with normal appearance: head, face, spine, neck, skin, chest, abdominal wall, gastrointestinal tract, kidneys, bladder, extremities.   Brain: Visualized and normal appearances: brain parenchyma, cerebral ventricles, choroid plexus, Cisterna Magna, midline falx, cerebellum, cerebellar lobes, posterior fossa, vermis, cavum septi pellucidi.  Face: eyes normal, profile normal, nose normal, lip normal, palate normal.  Heart: visualized and normal appearance: 3 vessel view, four-chamber, left outflow tract, right outflow tract, arches.      Genetic Sonogram:  Nuchal fold normal.  Pyelectasis absent.  No hyperechogenic bowel.  Echogenic intracardiac foci absent. Nasal bone present. Choroid plexus cyst absent.      Summary of Ultrasound findings:  This is a Pat pregnancy    The fetal measurements are consistent with established EDC. No gross ultrasound evidence of structural abnormalities are seen today. No minor markers for aneuploidy are seen. The patient understands that ultrasound cannot rule out all structural and chromosomal abnormalities.       IMPRESSION:   1. IUP @  20w2d  2. Scan consistent with EDC  9-23-25  3. No fetal structural abnormalities seen  4. Hypertension  5.  Obesity BMI 48  6.  Probable type 2 diabetes  7.  H/o preeclampsia    RECOMMENDATIONS:     1.  Weekly NST at  32wks / twice weekly at   34 wks     2.  Monthly Growth US starting at 28wks  3.  Continue current meds  4.  Continue aspirin until 37wks  5.  Monitor BP at home and keep log  6.  Baseline PIH labs  7. Fetal heart study at 22-24 wks     8.  Hgb A1C q trimester  9.  Send glucose logs weekly to [facility]   10.  11-20 lb weight gain for pregnancy  11.   Delivery for pre-existing diabetes, obesity and HTN at  38 weeks  12.  Change EDC 9-23-25      Thank you for allowing me to participate in the care of your patient.  Please do not hesitate to call with any questions or concerns.     Total time spent   45  minutes this calendar day which includes preparing to see the patient including chart review, obtaining and/or reviewing additional medical history, performing a physical exam and evaluation, documenting clinical information in the electronic medical record, independently interpreting results, counseling the patient, communicating results to the patient/family/caregiver and coordinating care.    Ravi Wong D.O.  Maternal Fetal Medicine     Note to patient and family:  The 21st Century Cures Act makes medical notes available to patients in the interest of transparency.  However, please be advised that this is a medical document.  It is intended as a peer to peer communication.  It is written in medical language and may contain abbreviations or verbiage that are technical and unfamiliar.  It may appear blunt or direct.  Medical documents are intended to carry relevant information, facts as evident, and the clinical opinion of the practitioner.         [1]   Allergies  Allergen Reactions    Amoxicillin RASH    Augmentin [Amoxicillin-Pot Clavulanate] RASH    Doxycycline NAUSEA ONLY    Penicillins RASH   [2]   Current Outpatient Medications    Medication Sig Dispense Refill    prenatal vitamin with DHA 27-0.8-228 MG Oral Cap Take 1 capsule by mouth daily.      metFORMIN HCl 1000 MG Oral Tab Take 1 tablet (1,000 mg total) by mouth daily.      insulin NPH-human isophane 100 Units/mL Subcutaneous Suspension Inject 7 Units into the skin at bedtime.      labetalol 200 MG Oral Tab Take 1 tablet (200 mg total) by mouth 3 (three) times daily. (Patient taking differently: Take 0.5 tablets (100 mg total) by mouth daily.) 90 tablet 2    FreeStyle Lancets Does not apply Misc Check blood sugars 4x per day 100 each 3    Glucose Blood (FREESTYLE LITE TEST) In Vitro Strip Check 4x per day for gestational diabetes 100 strip 3    Blood Glucose Monitoring Suppl (FREESTYLE FREEDOM LITE) w/Device Does not apply Kit 1 Device by Other route 2 (two) times daily. Use as directed. 1 kit 0    VALACYCLOVIR 1 G Oral Tab TAKE 1 TABLET(1000 MG) BY MOUTH EVERY 8 HOURS FOR 7 DAYS 21 tablet 0    ALPRAZOLAM 0.25 MG Oral Tab TAKE 1 TABLET(0.25 MG) BY MOUTH TWICE DAILY AS NEEDED 60 tablet 0    nebivolol 10 MG Oral Tab Take 1 tablet (10 mg total) by mouth daily. 90 tablet 1    Tirzepatide (MOUNJARO) 5 MG/0.5ML Subcutaneous Solution Pen-injector Inject 0.5 mL into the skin once a week. 2 mL 2    Tirzepatide (MOUNJARO) 2.5 MG/0.5ML Subcutaneous Solution Pen-injector Inject 2.5 mg into the skin once a week. 2 mL 1    mupirocin 2 % External Ointment Apply 1 Application topically 3 (three) times daily. 30 g 3    Norelgestromin-Eth Estradiol 150-35 MCG/24HR Transdermal Patch Weekly Place 1 patch onto the skin once a week. 3 patch 11    HYDROcodone-acetaminophen 5-325 MG Oral Tab Take 1 tablet by mouth every 6 (six) hours as needed. (Patient not taking: Reported on 6/27/2024) 30 tablet 0   [3]   Past Medical History:   ADHD (attention deficit hyperactivity disorder)     NO MEDS DURING PREG    Allergic rhinitis, cause unspecified    Anxiety    Bipolar 1 disorder (HCC)    NO MEDS DURING PREG     Bronchitis, not specified as acute or chronic    Essential hypertension    Gestational diabetes (HCC)    Infectious mononucleosis    Intractable vomiting with nausea, unspecified vomiting type    Otitis    Pneumonia    Pneumonia, organism unspecified(486)    as      Swollen tonsil   [4]   Past Surgical History:  Procedure Laterality Date          Contracept iud      IUD insertion 3/20/18 per Margaret/Dr. Lopez's office    D&c after delivery N/A 2022    Endoscopic ultrasound exam  2011    Tonsillectomy     [5]   Family History  Problem Relation Age of Onset    Diabetes Mother     Diabetes Father     Heart Disorder Father     Heart Attack Father     Hypertension Father     Diabetes Maternal Grandmother     Depression Maternal Grandmother     Anxiety Maternal Grandmother     Other (alzheimers) Maternal Grandmother     Skin cancer Maternal Grandmother     Diabetes Maternal Grandfather     Diabetes Paternal Grandmother     Other (bladder cancer) Paternal Grandmother    [6]   Social History  Socioeconomic History    Marital status:    Tobacco Use    Smoking status: Former     Current packs/day: 0.50     Average packs/day: 0.5 packs/day for 8.0 years (4.0 ttl pk-yrs)     Types: Cigarettes    Smokeless tobacco: Never   Vaping Use    Vaping status: Never Used   Substance and Sexual Activity    Alcohol use: Not Currently     Comment: very rare     Drug use: No    Sexual activity: Yes     Partners: Male   Other Topics Concern    Caffeine Concern Yes     Comment: 2 cups    Exercise Yes     Comment: 5 x a week.cardio    Seat Belt Yes     Social Drivers of Health     Food Insecurity: No Food Insecurity (2024)    Food Insecurity     Food Insecurity: Never true   Transportation Needs: No Transportation Needs (2024)    Transportation Needs     Lack of Transportation: No   Stress: No Stress Concern Present (2024)    Stress     Feeling of Stress : No   Housing Stability: Low Risk  (2024)     Housing Stability     Housing Instability: No

## 2025-05-19 NOTE — PROGRESS NOTES
Pt here for level II ultrasound/diabetic consult  + flutters  No complaints  Pt did not bring her blood sugar log with her to this appt today. Pt states fastings are elevated despite taking metformin every day and insulin HS.

## 2025-05-27 ENCOUNTER — TELEPHONE (OUTPATIENT)
Dept: PERINATAL CARE | Facility: HOSPITAL | Age: 30
End: 2025-05-27

## 2025-05-27 NOTE — TELEPHONE ENCOUNTER
New regimen:    Metformin 1,000mg HS  Humulin N 16 units HS w/ snack    Watch CHO intake with meals -  If persistently elevtaed post-dinner values, may need pre-dinner    Ephraim Chavez M.D.  Maternal-Fetal Medicine

## 2025-05-27 NOTE — TELEPHONE ENCOUNTER
23w0d  Received BS log for date range 5/20-5/26     Low  High Out of Range   Fasting Blood Sugar 104 130 7/7   Post Breakfast 108 120 1/7   Post Lunch 90 118 0/6   Post Dinner 108 125 4/5     Patient is currently taking:    Metformin 1,000mg HS  Humulin N 7 units HS w/ snack     To Dr. Chavez for review.

## 2025-06-03 ENCOUNTER — PATIENT MESSAGE (OUTPATIENT)
Dept: PERINATAL CARE | Facility: HOSPITAL | Age: 30
End: 2025-06-03

## 2025-06-03 ENCOUNTER — TELEPHONE (OUTPATIENT)
Dept: PERINATAL CARE | Facility: HOSPITAL | Age: 30
End: 2025-06-03

## 2025-06-03 NOTE — TELEPHONE ENCOUNTER
24w0d  Received BS log for date range 5/28-6/2     Low  High Out of Range   Fasting Blood Sugar 94 105 5/6   Post Breakfast 99 111 0/6   Post Lunch 99 115 0/6   Post Dinner 106 118 0/6     Patient is currently taking:    Humulin N 16 units at bedtime w/ snack  Metformin 1,000 at bedtime     To Dr. Boles for review.

## 2025-06-03 NOTE — TELEPHONE ENCOUNTER
Patient is currently taking:     Humulin N 16 units at bedtime w/ snack  Metformin 1,000 at bedtime    I have reviewed her blood sugar log from 828 through June 2.  Her fasting numbers are above goal.    Please have her increase her Humulin and to 22 units nightly with her snack  No change in her metformin    Harriett Boles MD

## 2025-06-10 ENCOUNTER — TELEPHONE (OUTPATIENT)
Dept: PERINATAL CARE | Facility: HOSPITAL | Age: 30
End: 2025-06-10

## 2025-06-10 NOTE — TELEPHONE ENCOUNTER
25w0d  Received BS log for date range 6/3-6/9     Low  High Out of Range   Fasting Blood Sugar 94 112 6/7   Post Breakfast 88 118 0/7   Post Lunch 113 120 2/6   Post Dinner 115 121 3/5     Patient is currently taking:    Metformin 1,000 HS  Humulin N 22 units HS w/ snack     To Dr. Boles for review.

## 2025-06-10 NOTE — TELEPHONE ENCOUNTER
Patient is currently taking:     Metformin 1,000 HS  Humulin N 22 units HS w/ snack    Her blood sugar log from Tanisha 3 through 9 was reviewed.  Her fasting blood sugars are elevated and she is having mildly elevated blood sugars after dinner.    I recommend the following:     Metformin 1,000 before dinner  Humulin N 30 units HS w/ snack    Harriett Boles MD

## 2025-06-16 ENCOUNTER — OFFICE VISIT (OUTPATIENT)
Dept: PERINATAL CARE | Facility: HOSPITAL | Age: 30
End: 2025-06-16
Attending: OBSTETRICS & GYNECOLOGY
Payer: COMMERCIAL

## 2025-06-16 VITALS
WEIGHT: 293 LBS | HEART RATE: 91 BPM | BODY MASS INDEX: 47.09 KG/M2 | DIASTOLIC BLOOD PRESSURE: 74 MMHG | SYSTOLIC BLOOD PRESSURE: 122 MMHG | HEIGHT: 66 IN

## 2025-06-16 DIAGNOSIS — Z87.59 HISTORY OF GESTATIONAL HYPERTENSION: ICD-10-CM

## 2025-06-16 DIAGNOSIS — E66.01 MORBID OBESITY DUE TO EXCESS CALORIES (HCC): ICD-10-CM

## 2025-06-16 DIAGNOSIS — O24.419 GESTATIONAL DIABETES MELLITUS (GDM) IN THIRD TRIMESTER, GESTATIONAL DIABETES METHOD OF CONTROL UNSPECIFIED (HCC): ICD-10-CM

## 2025-06-16 DIAGNOSIS — O16.9 HYPERTENSION AFFECTING PREGNANCY (HCC): ICD-10-CM

## 2025-06-16 DIAGNOSIS — E66.01 MATERNAL MORBID OBESITY, ANTEPARTUM, THIRD TRIMESTER (HCC): ICD-10-CM

## 2025-06-16 DIAGNOSIS — E66.01 MATERNAL MORBID OBESITY, ANTEPARTUM, THIRD TRIMESTER (HCC): Primary | ICD-10-CM

## 2025-06-16 DIAGNOSIS — E66.01 MORBID OBESITY (HCC): ICD-10-CM

## 2025-06-16 DIAGNOSIS — E66.01 MATERNAL MORBID OBESITY IN SECOND TRIMESTER, ANTEPARTUM (HCC): ICD-10-CM

## 2025-06-16 DIAGNOSIS — O99.213 MATERNAL MORBID OBESITY, ANTEPARTUM, THIRD TRIMESTER (HCC): ICD-10-CM

## 2025-06-16 DIAGNOSIS — O99.213 MATERNAL MORBID OBESITY, ANTEPARTUM, THIRD TRIMESTER (HCC): Primary | ICD-10-CM

## 2025-06-16 DIAGNOSIS — O99.212 MATERNAL MORBID OBESITY IN SECOND TRIMESTER, ANTEPARTUM (HCC): ICD-10-CM

## 2025-06-16 PROCEDURE — 76827 ECHO EXAM OF FETAL HEART: CPT

## 2025-06-16 PROCEDURE — 93325 DOPPLER ECHO COLOR FLOW MAPG: CPT

## 2025-06-16 PROCEDURE — 76825 ECHO EXAM OF FETAL HEART: CPT | Performed by: OBSTETRICS & GYNECOLOGY

## 2025-06-16 RX ORDER — INSULIN HUMAN 100 [IU]/ML
30 INJECTION, SUSPENSION SUBCUTANEOUS NIGHTLY
Qty: 15 ML | Refills: 1 | Status: SHIPPED | OUTPATIENT
Start: 2025-06-16

## 2025-06-16 NOTE — PROGRESS NOTES
Reason for Consult:   Dear Dr. Whitehead,    Thank you for requesting ultrasound evaluation and maternal fetal medicine consultation on Sharlene Solo.  As you are aware she is a 30 year old female with a Pat pregnancy .  A maternal-fetal medicine consultation was requested secondary to  obesity, hypertension and early onset GDM.  Her prenatal records and labs were reviewed.    On Labetalol 200mg TID.    Review of History:     OB History:    OB History    Para Term  AB Living   5 2 1 1 2 2   SAB IAB Ectopic Multiple Live Births   2 0 0 0 2      # Outcome Date GA Lbr Matthew/2nd Weight Sex Type Anes PTL Lv   5 Current            4 SAB 2024 8w0d          3  24 35w4d  6 lb 14.1 oz (3.12 kg) M Caesarean Spinal N BERNARDO      Complications: Group B beta strep +, Gestational hypertension, Severe preeclampsia      Name: Vincent Solo      Apgar1: 5  Apgar5: 8   2 SAB 2023 7w0d          1 Term 14 39w1d  7 lb 3.3 oz (3.27 kg) M CS-LTranv EPI N BERNARDO      Complications: Failure to progress      Apgar1: 9  Apgar5: 9             Allergies:  Allergies[1]   Current Meds:  Current Medications[2]     HISTORY:  Past Medical History[3]   Past Surgical History[4]   Family History[5]   Short Social Hx on File[6]     NARRATIVE:     /74 (BP Location: Right arm, Patient Position: Sitting, Cuff Size: large)   Pulse 91   Ht 5' 6\" (1.676 m)   Wt 297 lb (134.7 kg)   LMP 2023 (Exact Date)   BMI 47.94 kg/m²           Alert and Oriented.  No acute distress          Abdomen:  soft, nontender, no contractions noted.           extremities:  nontender, no edema           DISCUSSION  During her visit we discussed and reviewed the following issues:    OBESITY:    Obesity during pregnancy is associated with numerous maternal and  risks.  It is not clear whether obesity is a direct cause of adverse pregnancy outcome or whether the association between obesity and adverse pregnancy outcome  is due to factors such as diabetes mellitus.   Data suggest that obese women should be encouraged to undertake a weight reduction program (diet, exercise, behavior modification, and possibly bariatric surgery in some cases) prior to attempting to conceive.           -------------------  HYPERTENSION:         We discussed at length the potential implications associated with chronic hypertension.   I informed the patient that chronic hypertension increases the risk of pre-eclampsia, placental abruption, IUGR and fetal demise and possible need for  delivery.  The signs and symptoms of preeclampsia were discussed.   We also discussed the potential risks and benefits of low dose aspirin and anti-hypertensive medication.  She understands that a better diet, weight loss and routine exercise can help her blood pressure for the rest of her life.  We discussed the morbidity associated with hypertension including heart disease, strokes and kidney disease.         Prevention of Preeclampsia  See previous note    ----------------------  DIABETES:    She did not bring glucose logs.  States all Fastings above 120.   Normal after meals.  Does not eat an HS snack.    She was informed of the potential implications and risks associated with diabetes to her and her unborn child, especially when poorly controlled. There is an increased risk for early pregnancy loss and miscarriage.  There is an increased risk for fetal structural abnormlity, most commonly of the heart and spine.  There is an increased risk of growth discrepancies, which can range from fetal macrosomia to IUGR associated with placental dysfunction.  We discussed potential birth complications associated with fetal macrosomia, including shoulder dystocia, and associated complications.  There is an increased risk of stillbirth as well.   We talked about the increased and associated risk of fetal hyperinsulinemia, jaundice, electrolyte imbalance, seizure activity,  IUFD and adverse outcome.  There is also an increased risk of preeclampsia, especially in proteinuria is present.  The importance of good glycemic control and avoidance of prolonged hypo- and hyperglycemia was addressed.       In general good control of blood sugars can help to decrease these risks to a level close to non diabetic patients.    We discussed optimal contol of blood sugars would be achieved with fasting levels between 60 - 95, and 1 hour postptandial values of less than 140, or 2 hour postprandial values of less than 120.  Blood sugars should be check at least 4 times daily.      MEDICATION:  Metformin 1,000 before dinner  Humulin N 30 units HS w/ snack    RECOMMENDATION:  Metformin 1,000 before dinner  Humulin N 30 units HS w/ snack    Increase carbs at HS snack.  If no improvement in next 2-3 days then call back      OB ULTRASOUND REPORT   See imaging tab for complete ultrasound report or in PACS    Single IUP in cephalic presentation.    Placenta is posterior.   Cardiac activity is present at 146 bpm  MVP is 4.4 cm .     ______________________________________________________________  Echocardiography:  Fetus 1:    Image quality:  Good  Situs:  Normal  Position of stomach:  Left sided  Heart size:  Normal  Position of apex:  normal  Systemic veins:  Normal  Pulmonary veins:  Normal  Atrial septum:  Normal  Flow at foramen ovale:  Normal  Atrioventricular junction:  Concordant, patent AV valves, equal size  Atrioventricular valve regurgit.:  None  Ventricular septum:  Intact  Ventricular function:  Normal  Great artery connections:  Normal  Arterial valve regurgitation:  None  Branch pulmonary arteries,:  Confluent, normal size  Ductal Arch:  Normal  Aortic arch:  Normal  Rhythm:  Sinus        FETAL ECHOCARDIOGRAM:         A transabdominal 2-D, Doppler fetal echocardiogram is performed.  There is a four-chamber heart of appropriate cardiac dimensions.  There is situs solitus with levocardia.   Intracardiac connections appear to be normal.  The atrioventricular valves appear normal.  There is no evidence of pericardial or pleural effusion.  The A-V conduction is one to one and the heart rate is appropriate for gestational age.  No evidence of fetal arrhythmias is seen during today's study.  There is a right to left shunting across the patent foramen ovale.  There is a normal appearance of the aorta and branching pattern of the head vessels.        There appears to be a structurally normal fetal heart and rhythm.  The patient was made aware of the limitations of fetal heart study: malformations such as but not limiting to minor valve, VSD or coarctation of the aorta may be missed. I discussed the results with the patient.       IMPRESSION:   1. IUP @  25w6d   2. Normal fetal echocardiogram  3. Probable type 2 diabetes  4. Hypertension  5.  Obesity BMI 48  6.   H/o preeclampsia    RECOMMENDATIONS:     1.  Weekly NST at  32wks / twice weekly at   34 wks     2.  Monthly Growth US starting at 28wks  3.  Continue current meds  4.  Continue aspirin until 37wks  5.  Monitor BP at home and keep log  6.  Hgb A1C q trimester  7. Delivery for pre-existing diabetes, obesity and HTN at  38 weeks  8.  11-20 lb weight gain for pregnancy  9.  Send glucose logs weekly to [facility]           Thank you for allowing me to participate in the care of your patient.  Please do not hesitate to call with any questions or concerns.     Total time spent   35  minutes this calendar day which includes preparing to see the patient including chart review, obtaining and/or reviewing additional medical history, performing a physical exam and evaluation, documenting clinical information in the electronic medical record, independently interpreting results, counseling the patient, communicating results to the patient/family/caregiver and coordinating care.    Ravi Wong D.O.  Maternal Fetal Medicine     Note to patient and family:  The 21st  Century Cures Act makes medical notes available to patients in the interest of transparency.  However, please be advised that this is a medical document.  It is intended as a peer to peer communication.  It is written in medical language and may contain abbreviations or verbiage that are technical and unfamiliar.  It may appear blunt or direct.  Medical documents are intended to carry relevant information, facts as evident, and the clinical opinion of the practitioner.         [1]   Allergies  Allergen Reactions    Amoxicillin RASH    Augmentin [Amoxicillin-Pot Clavulanate] RASH    Doxycycline NAUSEA ONLY    Penicillins RASH   [2]   Current Outpatient Medications   Medication Sig Dispense Refill    prenatal vitamin with DHA 27-0.8-228 MG Oral Cap Take 1 capsule by mouth daily.      metFORMIN HCl 1000 MG Oral Tab Take 1 tablet (1,000 mg total) by mouth Before Dinner.      insulin NPH-human isophane 100 Units/mL Subcutaneous Suspension Inject 30 Units into the skin at bedtime.      labetalol 200 MG Oral Tab Take 1 tablet (200 mg total) by mouth 3 (three) times daily. 90 tablet 2    Insulin Pen Needle 31G X 5 MM Does not apply Misc Use a new needle with each injection 100 each 1    FreeStyle Lancets Does not apply Misc Check blood sugars 4x per day 100 each 3    Glucose Blood (FREESTYLE LITE TEST) In Vitro Strip Check 4x per day for gestational diabetes 100 strip 3    Blood Glucose Monitoring Suppl (FREESTYLE FREEDOM LITE) w/Device Does not apply Kit 1 Device by Other route 2 (two) times daily. Use as directed. 1 kit 0    VALACYCLOVIR 1 G Oral Tab TAKE 1 TABLET(1000 MG) BY MOUTH EVERY 8 HOURS FOR 7 DAYS 21 tablet 0    ALPRAZOLAM 0.25 MG Oral Tab TAKE 1 TABLET(0.25 MG) BY MOUTH TWICE DAILY AS NEEDED 60 tablet 0    nebivolol 10 MG Oral Tab Take 1 tablet (10 mg total) by mouth daily. 90 tablet 1    Tirzepatide (MOUNJARO) 5 MG/0.5ML Subcutaneous Solution Pen-injector Inject 0.5 mL into the skin once a week. 2 mL 2     Tirzepatide (MOUNJARO) 2.5 MG/0.5ML Subcutaneous Solution Pen-injector Inject 2.5 mg into the skin once a week. 2 mL 1    mupirocin 2 % External Ointment Apply 1 Application topically 3 (three) times daily. 30 g 3    Norelgestromin-Eth Estradiol 150-35 MCG/24HR Transdermal Patch Weekly Place 1 patch onto the skin once a week. 3 patch 11    HYDROcodone-acetaminophen 5-325 MG Oral Tab Take 1 tablet by mouth every 6 (six) hours as needed. (Patient not taking: Reported on 2024) 30 tablet 0   [3]   Past Medical History:   ADHD (attention deficit hyperactivity disorder)     NO MEDS DURING PREG    Allergic rhinitis, cause unspecified    Anxiety    Bipolar 1 disorder (HCC)    NO MEDS DURING PREG    Bronchitis, not specified as acute or chronic    Essential hypertension    Gestational diabetes (HCC)    Infectious mononucleosis    Intractable vomiting with nausea, unspecified vomiting type    Otitis    Pneumonia    Pneumonia, organism unspecified(486)    as      Swollen tonsil   [4]   Past Surgical History:  Procedure Laterality Date          Contracept iud      IUD insertion 3/20/18 per Margaret/Dr. Lopez's office    D&c after delivery N/A 2022    Endoscopic ultrasound exam  2011    Tonsillectomy     [5]   Family History  Problem Relation Age of Onset    Diabetes Mother     Diabetes Father     Heart Disorder Father     Heart Attack Father     Hypertension Father     Diabetes Maternal Grandmother     Depression Maternal Grandmother     Anxiety Maternal Grandmother     Other (alzheimers) Maternal Grandmother     Skin cancer Maternal Grandmother     Diabetes Maternal Grandfather     Diabetes Paternal Grandmother     Other (bladder cancer) Paternal Grandmother    [6]   Social History  Socioeconomic History    Marital status:    Tobacco Use    Smoking status: Former     Current packs/day: 0.50     Average packs/day: 0.5 packs/day for 8.0 years (4.0 ttl pk-yrs)     Types: Cigarettes    Smokeless  tobacco: Never   Vaping Use    Vaping status: Never Used   Substance and Sexual Activity    Alcohol use: Not Currently     Comment: very rare     Drug use: No    Sexual activity: Yes     Partners: Male   Other Topics Concern    Caffeine Concern Yes     Comment: 2 cups    Exercise Yes     Comment: 5 x a week.cardio    Seat Belt Yes     Social Drivers of Health     Food Insecurity: No Food Insecurity (5/8/2024)    Food Insecurity     Food Insecurity: Never true   Transportation Needs: No Transportation Needs (5/8/2024)    Transportation Needs     Lack of Transportation: No   Stress: No Stress Concern Present (5/8/2024)    Stress     Feeling of Stress : No   Housing Stability: Low Risk  (5/8/2024)    Housing Stability     Housing Instability: No

## 2025-06-23 ENCOUNTER — TELEPHONE (OUTPATIENT)
Dept: PERINATAL CARE | Facility: HOSPITAL | Age: 30
End: 2025-06-23

## 2025-06-23 RX ORDER — INSULIN HUMAN 100 [IU]/ML
36 INJECTION, SUSPENSION SUBCUTANEOUS NIGHTLY
Qty: 15 ML | Refills: 1 | Status: SHIPPED | OUTPATIENT
Start: 2025-06-23

## 2025-06-23 NOTE — TELEPHONE ENCOUNTER
26w6d  Received BS log for date range 6/17-6/22     Low  High Out of Range   Fasting Blood Sugar 97 129 6/6   Post Breakfast 95 111 0/6   Post Lunch 89 115 0/6   Post Dinner 117 124 3/6     Patient is currently taking Metformin 1000 mg before dinner.  Humalin N 30 units at bedtime.     To Dr. Wong for review.

## 2025-06-23 NOTE — TELEPHONE ENCOUNTER
Summary: Diabetic Management   26w6d  Received BS log for date range 6/17-6/22       Low  High Out of Range   Fasting Blood Sugar 97 129 6/6   Post Breakfast 95 111 0/6   Post Lunch 89 115 0/6   Post Dinner 117 124 3/6      CHANGE  Metformin 1000 mg before dinner.  Humalin N 36 units at bedtime.      Dr. Wong

## 2025-06-30 ENCOUNTER — OFFICE VISIT (OUTPATIENT)
Dept: PERINATAL CARE | Facility: HOSPITAL | Age: 30
End: 2025-06-30
Attending: OBSTETRICS & GYNECOLOGY
Payer: COMMERCIAL

## 2025-06-30 VITALS
HEIGHT: 66 IN | BODY MASS INDEX: 47.09 KG/M2 | HEART RATE: 97 BPM | WEIGHT: 293 LBS | DIASTOLIC BLOOD PRESSURE: 84 MMHG | SYSTOLIC BLOOD PRESSURE: 132 MMHG

## 2025-06-30 DIAGNOSIS — E66.01 MATERNAL MORBID OBESITY IN THIRD TRIMESTER, ANTEPARTUM (HCC): ICD-10-CM

## 2025-06-30 DIAGNOSIS — O16.9 HYPERTENSION AFFECTING PREGNANCY (HCC): ICD-10-CM

## 2025-06-30 DIAGNOSIS — O24.419 GESTATIONAL DIABETES MELLITUS (GDM) IN THIRD TRIMESTER, GESTATIONAL DIABETES METHOD OF CONTROL UNSPECIFIED (HCC): ICD-10-CM

## 2025-06-30 DIAGNOSIS — O16.3 HYPERTENSION AFFECTING PREGNANCY IN THIRD TRIMESTER (HCC): ICD-10-CM

## 2025-06-30 DIAGNOSIS — E66.01 MORBID OBESITY (HCC): ICD-10-CM

## 2025-06-30 DIAGNOSIS — O99.213 MATERNAL MORBID OBESITY IN THIRD TRIMESTER, ANTEPARTUM (HCC): ICD-10-CM

## 2025-06-30 DIAGNOSIS — O24.419 GESTATIONAL DIABETES MELLITUS (GDM) IN THIRD TRIMESTER, GESTATIONAL DIABETES METHOD OF CONTROL UNSPECIFIED (HCC): Primary | ICD-10-CM

## 2025-06-30 PROCEDURE — 76816 OB US FOLLOW-UP PER FETUS: CPT | Performed by: OBSTETRICS & GYNECOLOGY

## 2025-06-30 RX ORDER — INSULIN HUMAN 100 [IU]/ML
42 INJECTION, SUSPENSION SUBCUTANEOUS NIGHTLY
Qty: 15 ML | Refills: 1 | Status: SHIPPED | OUTPATIENT
Start: 2025-06-30

## 2025-06-30 NOTE — PROGRESS NOTES
Reason for Consult:   Dear Dr. Whitehead,    Thank you for requesting ultrasound evaluation and maternal fetal medicine consultation on Sharlene Solo.  As you are aware she is a 30 year old female with a Pat pregnancy .  A maternal-fetal medicine consultation was requested secondary to  obesity, hypertension and early onset GDM.  Her prenatal records and labs were reviewed.    On Labetalol 200mg TID.    Review of History:     OB History:    OB History    Para Term  AB Living   5 2 1 1 2 2   SAB IAB Ectopic Multiple Live Births   2 0 0 0 2      # Outcome Date GA Lbr Matthew/2nd Weight Sex Type Anes PTL Lv   5 Current            4 SAB 2024 8w0d          3  24 35w4d  6 lb 14.1 oz (3.12 kg) M Caesarean Spinal N BERNARDO      Complications: Group B beta strep +, Gestational hypertension, Severe preeclampsia      Name: Vincent Solo      Apgar1: 5  Apgar5: 8   2 SAB 2023 7w0d          1 Term 14 39w1d  7 lb 3.3 oz (3.27 kg) M CS-LTranv EPI N BERNARDO      Complications: Failure to progress      Apgar1: 9  Apgar5: 9             Allergies:  Allergies[1]   Current Meds:  Current Medications[2]     HISTORY:  Past Medical History[3]   Past Surgical History[4]   Family History[5]   Short Social Hx on File[6]     NARRATIVE:   /84 (BP Location: Right arm, Patient Position: Sitting, Cuff Size: large)   Pulse 97   Ht 5' 6\" (1.676 m)   Wt (!) 301 lb (136.5 kg)   LMP 2023 (Exact Date)   BMI 48.58 kg/m²            Alert and Oriented.  No acute distress          Abdomen:  soft, nontender, no contractions noted.           extremities:  nontender, no edema           DISCUSSION  During her visit we discussed and reviewed the following issues:    OBESITY:    Obesity during pregnancy is associated with numerous maternal and  risks.  It is not clear whether obesity is a direct cause of adverse pregnancy outcome or whether the association between obesity and adverse pregnancy  outcome is due to factors such as diabetes mellitus.   Data suggest that obese women should be encouraged to undertake a weight reduction program (diet, exercise, behavior modification, and possibly bariatric surgery in some cases) prior to attempting to conceive.           -------------------  HYPERTENSION:         We discussed at length the potential implications associated with chronic hypertension.   I informed the patient that chronic hypertension increases the risk of pre-eclampsia, placental abruption, IUGR and fetal demise and possible need for  delivery.  The signs and symptoms of preeclampsia were discussed.   We also discussed the potential risks and benefits of low dose aspirin and anti-hypertensive medication.  She understands that a better diet, weight loss and routine exercise can help her blood pressure for the rest of her life.  We discussed the morbidity associated with hypertension including heart disease, strokes and kidney disease.         Prevention of Preeclampsia  See previous note    ----------------------  DIABETES:    Fasting glucose still elevated.    She was informed of the potential implications and risks associated with diabetes to her and her unborn child, especially when poorly controlled. There is an increased risk for early pregnancy loss and miscarriage.  There is an increased risk for fetal structural abnormlity, most commonly of the heart and spine.  There is an increased risk of growth discrepancies, which can range from fetal macrosomia to IUGR associated with placental dysfunction.  We discussed potential birth complications associated with fetal macrosomia, including shoulder dystocia, and associated complications.  There is an increased risk of stillbirth as well.   We talked about the increased and associated risk of fetal hyperinsulinemia, jaundice, electrolyte imbalance, seizure activity, IUFD and adverse outcome.  There is also an increased risk of preeclampsia,  especially in proteinuria is present.  The importance of good glycemic control and avoidance of prolonged hypo- and hyperglycemia was addressed.       In general good control of blood sugars can help to decrease these risks to a level close to non diabetic patients.    We discussed optimal contol of blood sugars would be achieved with fasting levels between 60 - 95, and 1 hour postptandial values of less than 140, or 2 hour postprandial values of less than 120.  Blood sugars should be check at least 4 times daily.      MEDICATION:  Metformin 1000 mg before dinner.  Humalin N 36 units at bedtime.    RECOMMENDATION:  Metformin 1000 mg before dinner.  Humalin N 42 units at bedtime.      OB ULTRASOUND REPORT   See imaging tab for complete ultrasound report or in PACS      Single IUP in cephalic presentation.    Placenta is posterior.   Cardiac activity is present at 138 bpm  EFW 1115 g ( 2 lb 7 oz); 49%.    MVP is 4.2 cm . MERARI 12.3 cm        IMPRESSION:   1. IUP @  27w6d   2. Normal fetal growth  3. Probable type 2 diabetes  4. Hypertension  5.  Obesity BMI 48  6.   H/o preeclampsia    RECOMMENDATIONS:     1.  Weekly NST at  32wks / twice weekly at   34 wks     2.  Monthly Growth US starting at 28wks  3.  Continue current meds  4.  Continue aspirin until 37wks  5.  Monitor BP at home and keep log  6.  Hgb A1C q trimester  7. Delivery for pre-existing diabetes, obesity and HTN at  38 weeks  8.  11-20 lb weight gain for pregnancy  9.  Send glucose logs weekly to [facility]           Thank you for allowing me to participate in the care of your patient.  Please do not hesitate to call with any questions or concerns.     Total time spent   35  minutes this calendar day which includes preparing to see the patient including chart review, obtaining and/or reviewing additional medical history, performing a physical exam and evaluation, documenting clinical information in the electronic medical record, independently interpreting  results, counseling the patient, communicating results to the patient/family/caregiver and coordinating care.    Ravi Wong D.O.  Maternal Fetal Medicine     Note to patient and family:  The 21st Century Cures Act makes medical notes available to patients in the interest of transparency.  However, please be advised that this is a medical document.  It is intended as a peer to peer communication.  It is written in medical language and may contain abbreviations or verbiage that are technical and unfamiliar.  It may appear blunt or direct.  Medical documents are intended to carry relevant information, facts as evident, and the clinical opinion of the practitioner.         [1]   Allergies  Allergen Reactions    Amoxicillin RASH    Augmentin [Amoxicillin-Pot Clavulanate] RASH    Doxycycline NAUSEA ONLY    Penicillins RASH   [2]   Current Outpatient Medications   Medication Sig Dispense Refill    Insulin NPH, Human,, Isophane, (HUMULIN N KWIKPEN) 100 UNIT/ML Subcutaneous Suspension Pen-injector Inject 36 Units into the skin at bedtime. Use a new needle with each injection 15 mL 1    prenatal vitamin with DHA 27-0.8-228 MG Oral Cap Take 1 capsule by mouth daily.      metFORMIN HCl 1000 MG Oral Tab Take 1 tablet (1,000 mg total) by mouth Before Dinner.      VALACYCLOVIR 1 G Oral Tab TAKE 1 TABLET(1000 MG) BY MOUTH EVERY 8 HOURS FOR 7 DAYS (Patient taking differently: Take 1 tablet (1,000 mg total) by mouth as needed.) 21 tablet 0    labetalol 200 MG Oral Tab Take 1 tablet (200 mg total) by mouth 3 (three) times daily. 90 tablet 2    insulin NPH-human isophane 100 Units/mL Subcutaneous Suspension Inject 30 Units into the skin at bedtime.      Insulin Pen Needle 31G X 5 MM Does not apply Misc Use a new needle with each injection 100 each 1    FreeStyle Lancets Does not apply Misc Check blood sugars 4x per day 100 each 3    Glucose Blood (FREESTYLE LITE TEST) In Vitro Strip Check 4x per day for gestational diabetes 100  strip 3    Blood Glucose Monitoring Suppl (FREESTYLE FREEDOM LITE) w/Device Does not apply Kit 1 Device by Other route 2 (two) times daily. Use as directed. 1 kit 0    ALPRAZOLAM 0.25 MG Oral Tab TAKE 1 TABLET(0.25 MG) BY MOUTH TWICE DAILY AS NEEDED 60 tablet 0    nebivolol 10 MG Oral Tab Take 1 tablet (10 mg total) by mouth daily. 90 tablet 1    Tirzepatide (MOUNJARO) 5 MG/0.5ML Subcutaneous Solution Pen-injector Inject 0.5 mL into the skin once a week. 2 mL 2    Tirzepatide (MOUNJARO) 2.5 MG/0.5ML Subcutaneous Solution Pen-injector Inject 2.5 mg into the skin once a week. 2 mL 1    mupirocin 2 % External Ointment Apply 1 Application topically 3 (three) times daily. 30 g 3    Norelgestromin-Eth Estradiol 150-35 MCG/24HR Transdermal Patch Weekly Place 1 patch onto the skin once a week. 3 patch 11    HYDROcodone-acetaminophen 5-325 MG Oral Tab Take 1 tablet by mouth every 6 (six) hours as needed. (Patient not taking: Reported on 2024) 30 tablet 0   [3]   Past Medical History:   ADHD (attention deficit hyperactivity disorder)     NO MEDS DURING PREG    Allergic rhinitis, cause unspecified    Anxiety    Bipolar 1 disorder (HCC)    NO MEDS DURING PREG    Bronchitis, not specified as acute or chronic    Essential hypertension    Gestational diabetes (HCC)    Infectious mononucleosis    Intractable vomiting with nausea, unspecified vomiting type    Otitis    Pneumonia    Pneumonia, organism unspecified(486)    as      Swollen tonsil   [4]   Past Surgical History:  Procedure Laterality Date          Contracept iud      IUD insertion 3/20/18 per Margaret/Dr. Lopez's office    D&c after delivery N/A 2022    Endoscopic ultrasound exam  2011    Tonsillectomy     [5]   Family History  Problem Relation Age of Onset    Diabetes Mother     Diabetes Father     Heart Disorder Father     Heart Attack Father     Hypertension Father     Diabetes Maternal Grandmother     Depression Maternal Grandmother     Anxiety  Maternal Grandmother     Other (alzheimers) Maternal Grandmother     Skin cancer Maternal Grandmother     Diabetes Maternal Grandfather     Diabetes Paternal Grandmother     Other (bladder cancer) Paternal Grandmother    [6]   Social History  Socioeconomic History    Marital status:    Tobacco Use    Smoking status: Former     Current packs/day: 0.50     Average packs/day: 0.5 packs/day for 8.0 years (4.0 ttl pk-yrs)     Types: Cigarettes    Smokeless tobacco: Never   Vaping Use    Vaping status: Never Used   Substance and Sexual Activity    Alcohol use: Not Currently     Comment: very rare     Drug use: No    Sexual activity: Yes     Partners: Male   Other Topics Concern    Caffeine Concern Yes     Comment: 2 cups    Exercise Yes     Comment: 5 x a week.cardio    Seat Belt Yes     Social Drivers of Health     Food Insecurity: No Food Insecurity (5/8/2024)    Food Insecurity     Food Insecurity: Never true   Transportation Needs: No Transportation Needs (5/8/2024)    Transportation Needs     Lack of Transportation: No   Stress: No Stress Concern Present (5/8/2024)    Stress     Feeling of Stress : No   Housing Stability: Low Risk  (5/8/2024)    Housing Stability     Housing Instability: No

## 2025-07-14 ENCOUNTER — TELEPHONE (OUTPATIENT)
Dept: PERINATAL CARE | Facility: HOSPITAL | Age: 30
End: 2025-07-14

## 2025-07-14 RX ORDER — INSULIN HUMAN 100 [IU]/ML
50 INJECTION, SUSPENSION SUBCUTANEOUS NIGHTLY
Qty: 15 ML | Refills: 1 | Status: SHIPPED | OUTPATIENT
Start: 2025-07-14

## 2025-07-14 NOTE — TELEPHONE ENCOUNTER
29w6d  Received BS log for date range 7/10-7/13       Low  High Out of Range   Fasting Blood Sugar 97 103 5/5   Post Breakfast 88 119 0/5   Post Lunch 110 125 2/5   Post Dinner 108 124 3/5      CHANGE     Metformin 1,000mg before dinner  Humulin N 50 units HS w/ snack     Dr. Wong

## 2025-07-14 NOTE — TELEPHONE ENCOUNTER
29w6d  Received BS log for date range 7/10-7/13     Low  High Out of Range   Fasting Blood Sugar 97 103 5/5   Post Breakfast 88 119 0/5   Post Lunch 110 125 2/5   Post Dinner 108 124 3/5     Patient is currently taking:    Metformin 1,000mg before dinner  Humulin N 46 units HS w/ snack     To Dr. Wong for review.

## 2025-07-21 RX ORDER — INSULIN HUMAN 100 [IU]/ML
54 INJECTION, SUSPENSION SUBCUTANEOUS NIGHTLY
Qty: 15 ML | Refills: 1 | Status: SHIPPED | OUTPATIENT
Start: 2025-07-21

## 2025-07-28 ENCOUNTER — OFFICE VISIT (OUTPATIENT)
Dept: PERINATAL CARE | Facility: HOSPITAL | Age: 30
End: 2025-07-28
Attending: OBSTETRICS & GYNECOLOGY
Payer: COMMERCIAL

## 2025-07-28 VITALS
SYSTOLIC BLOOD PRESSURE: 136 MMHG | DIASTOLIC BLOOD PRESSURE: 86 MMHG | HEIGHT: 66 IN | WEIGHT: 293 LBS | BODY MASS INDEX: 47.09 KG/M2 | HEART RATE: 98 BPM

## 2025-07-28 DIAGNOSIS — Z87.59 HISTORY OF GESTATIONAL HYPERTENSION: ICD-10-CM

## 2025-07-28 DIAGNOSIS — E66.01 MATERNAL MORBID OBESITY, ANTEPARTUM, THIRD TRIMESTER (HCC): Primary | ICD-10-CM

## 2025-07-28 DIAGNOSIS — E66.01 MORBID OBESITY DUE TO EXCESS CALORIES (HCC): ICD-10-CM

## 2025-07-28 DIAGNOSIS — O99.213 MATERNAL MORBID OBESITY IN THIRD TRIMESTER, ANTEPARTUM (HCC): ICD-10-CM

## 2025-07-28 DIAGNOSIS — O99.213 MATERNAL MORBID OBESITY, ANTEPARTUM, THIRD TRIMESTER (HCC): ICD-10-CM

## 2025-07-28 DIAGNOSIS — O16.3 HYPERTENSION AFFECTING PREGNANCY IN THIRD TRIMESTER (HCC): ICD-10-CM

## 2025-07-28 DIAGNOSIS — E66.01 MATERNAL MORBID OBESITY IN THIRD TRIMESTER, ANTEPARTUM (HCC): ICD-10-CM

## 2025-07-28 DIAGNOSIS — E66.01 MORBID OBESITY (HCC): ICD-10-CM

## 2025-07-28 DIAGNOSIS — O24.419 GESTATIONAL DIABETES MELLITUS (GDM) IN THIRD TRIMESTER, GESTATIONAL DIABETES METHOD OF CONTROL UNSPECIFIED (HCC): ICD-10-CM

## 2025-07-28 DIAGNOSIS — E66.01 MATERNAL MORBID OBESITY, ANTEPARTUM, THIRD TRIMESTER (HCC): ICD-10-CM

## 2025-07-28 DIAGNOSIS — O99.213 MATERNAL MORBID OBESITY, ANTEPARTUM, THIRD TRIMESTER (HCC): Primary | ICD-10-CM

## 2025-07-28 PROCEDURE — 76816 OB US FOLLOW-UP PER FETUS: CPT | Performed by: OBSTETRICS & GYNECOLOGY

## 2025-07-28 RX ORDER — INSULIN HUMAN 100 [IU]/ML
58 INJECTION, SUSPENSION SUBCUTANEOUS NIGHTLY
Qty: 15 ML | Refills: 1 | Status: SHIPPED | OUTPATIENT
Start: 2025-07-28

## 2025-07-28 RX ORDER — ASPIRIN 81 MG/1
162 TABLET ORAL DAILY
COMMUNITY

## 2025-07-28 NOTE — PROGRESS NOTES
Reason for Consult:   Dear Dr. Whitehead,    Thank you for requesting ultrasound evaluation and maternal fetal medicine consultation on Sharlene Solo.  As you are aware she is a 30 year old female with a Pat pregnancy .  A maternal-fetal medicine consultation was requested secondary to  obesity, hypertension and early onset GDM.  Her prenatal records and labs were reviewed.    On Labetalol 200mg TID.    Review of History:     OB History:    OB History    Para Term  AB Living   5 2 1 1 2 2   SAB IAB Ectopic Multiple Live Births   2 0 0 0 2      # Outcome Date GA Lbr Matthew/2nd Weight Sex Type Anes PTL Lv   5 Current            4 SAB 2024 8w0d          3  24 35w4d  6 lb 14.1 oz (3.12 kg) M Caesarean Spinal N BERNARDO      Complications: Group B beta strep +, Gestational hypertension, Severe preeclampsia      Name: Vincent Solo      Apgar1: 5  Apgar5: 8   2 SAB 2023 7w0d          1 Term 14 39w1d  7 lb 3.3 oz (3.27 kg) M CS-LTranv EPI N BERNARDO      Complications: Failure to progress      Apgar1: 9  Apgar5: 9             Allergies:  Allergies[1]   Current Meds:  Current Medications[2]     HISTORY:  Past Medical History[3]   Past Surgical History[4]   Family History[5]   Short Social Hx on File[6]     NARRATIVE:   /86 (BP Location: Right arm, Patient Position: Sitting, Cuff Size: large)   Pulse 98   Ht 5' 6\" (1.676 m)   Wt 299 lb (135.6 kg)   LMP 2023 (Exact Date)   BMI 48.26 kg/m²            Alert and Oriented.  No acute distress          Abdomen:  soft, nontender, no contractions noted.           extremities:  nontender, no edema           DISCUSSION  During her visit we discussed and reviewed the following issues:    OBESITY:    Obesity during pregnancy is associated with numerous maternal and  risks.  It is not clear whether obesity is a direct cause of adverse pregnancy outcome or whether the association between obesity and adverse pregnancy outcome is  due to factors such as diabetes mellitus.   Data suggest that obese women should be encouraged to undertake a weight reduction program (diet, exercise, behavior modification, and possibly bariatric surgery in some cases) prior to attempting to conceive.           -------------------  HYPERTENSION:         We discussed at length the potential implications associated with chronic hypertension.   I informed the patient that chronic hypertension increases the risk of pre-eclampsia, placental abruption, IUGR and fetal demise and possible need for  delivery.  The signs and symptoms of preeclampsia were discussed.   We also discussed the potential risks and benefits of low dose aspirin and anti-hypertensive medication.  She understands that a better diet, weight loss and routine exercise can help her blood pressure for the rest of her life.  We discussed the morbidity associated with hypertension including heart disease, strokes and kidney disease.         Prevention of Preeclampsia  See previous note    ----------------------  DIABETES:    Fasting glucose still elevated.    She was informed of the potential implications and risks associated with diabetes to her and her unborn child, especially when poorly controlled. There is an increased risk for early pregnancy loss and miscarriage.  There is an increased risk for fetal structural abnormlity, most commonly of the heart and spine.  There is an increased risk of growth discrepancies, which can range from fetal macrosomia to IUGR associated with placental dysfunction.  We discussed potential birth complications associated with fetal macrosomia, including shoulder dystocia, and associated complications.  There is an increased risk of stillbirth as well.   We talked about the increased and associated risk of fetal hyperinsulinemia, jaundice, electrolyte imbalance, seizure activity, IUFD and adverse outcome.  There is also an increased risk of preeclampsia, especially  in proteinuria is present.  The importance of good glycemic control and avoidance of prolonged hypo- and hyperglycemia was addressed.       In general good control of blood sugars can help to decrease these risks to a level close to non diabetic patients.    We discussed optimal contol of blood sugars would be achieved with fasting levels between 60 - 95, and 1 hour postptandial values of less than 140, or 2 hour postprandial values of less than 120.  Blood sugars should be check at least 4 times daily.      MEDICATION:  Metformin 1,000mg before dinner  Humulin N 56 units HS w/ snack    RECOMMENDATION:  Metformin 1000 mg before dinner.  Humalin N 58 units at bedtime.        OB ULTRASOUND REPORT   See imaging tab for complete ultrasound report or in PACS    Single IUP in cephalic presentation.    Placenta is posterior.   Cardiac activity is present at 134 bpm  EFW 1718 g ( 3 lb 13 oz); 36%.    MVP is 4 cm . MERARI 14 cm        IMPRESSION:   1. IUP @  31w6d   2. Normal fetal growth  3. Probable type 2 diabetes  4. Hypertension  5.  Obesity BMI 48  6.   H/o preeclampsia    RECOMMENDATIONS:     1.  Weekly NST at  32wks / twice weekly at   34 wks     2.  Monthly Growth US   3.  Continue current meds  4.  Continue aspirin until 37wks  5.  Monitor BP at home and keep log  6.  Hgb A1C q trimester  7. Delivery for pre-existing diabetes, obesity and HTN at  38 weeks  8.  11-20 lb weight gain for pregnancy  9.  [facility]   10.  Increase insulin as above    She requested betamethasone.  I told I her there is not a reason to recommend it especially since she has diabetes.      Thank you for allowing me to participate in the care of your patient.  Please do not hesitate to call with any questions or concerns.     Total time spent  25  minutes this calendar day which includes preparing to see the patient including chart review, obtaining and/or reviewing additional medical history, performing a physical exam  and evaluation, documenting clinical information in the electronic medical record, independently interpreting results, counseling the patient, communicating results to the patient/family/caregiver and coordinating care.    Ravi Wong D.O.  Maternal Fetal Medicine     Note to patient and family:  The 21st Century Cures Act makes medical notes available to patients in the interest of transparency.  However, please be advised that this is a medical document.  It is intended as a peer to peer communication.  It is written in medical language and may contain abbreviations or verbiage that are technical and unfamiliar.  It may appear blunt or direct.  Medical documents are intended to carry relevant information, facts as evident, and the clinical opinion of the practitioner.         [1]   Allergies  Allergen Reactions    Amoxicillin RASH    Augmentin [Amoxicillin-Pot Clavulanate] RASH    Doxycycline NAUSEA ONLY    Penicillins RASH   [2]   Current Outpatient Medications   Medication Sig Dispense Refill    aspirin 81 MG Oral Tab EC Take 2 tablets (162 mg total) by mouth daily.      Insulin NPH, Human,, Isophane, (HUMULIN N KWIKPEN) 100 UNIT/ML Subcutaneous Suspension Pen-injector Inject 54 Units into the skin at bedtime. Use a new needle with each injection (Patient taking differently: Inject 56 Units into the skin at bedtime. Use a new needle with each injection) 15 mL 1    prenatal vitamin with DHA 27-0.8-228 MG Oral Cap Take 1 capsule by mouth daily.      metFORMIN HCl 1000 MG Oral Tab Take 1 tablet (1,000 mg total) by mouth Before Dinner.      labetalol 200 MG Oral Tab Take 1 tablet (200 mg total) by mouth 3 (three) times daily. (Patient taking differently: Take 1 tablet (200 mg total) by mouth 2 (two) times daily.) 90 tablet 2    insulin NPH-human isophane 100 Units/mL Subcutaneous Suspension Inject 30 Units into the skin at bedtime. (Patient not taking: Reported on 7/28/2025)      Insulin Pen Needle 31G X 5 MM  Does not apply Misc Use a new needle with each injection 100 each 1    FreeStyle Lancets Does not apply Misc Check blood sugars 4x per day 100 each 3    Glucose Blood (FREESTYLE LITE TEST) In Vitro Strip Check 4x per day for gestational diabetes 100 strip 3    Blood Glucose Monitoring Suppl (FREESTYLE FREEDOM LITE) w/Device Does not apply Kit 1 Device by Other route 2 (two) times daily. Use as directed. 1 kit 0    VALACYCLOVIR 1 G Oral Tab TAKE 1 TABLET(1000 MG) BY MOUTH EVERY 8 HOURS FOR 7 DAYS (Patient taking differently: Take 1 tablet (1,000 mg total) by mouth as needed.) 21 tablet 0    ALPRAZOLAM 0.25 MG Oral Tab TAKE 1 TABLET(0.25 MG) BY MOUTH TWICE DAILY AS NEEDED 60 tablet 0    nebivolol 10 MG Oral Tab Take 1 tablet (10 mg total) by mouth daily. 90 tablet 1    Tirzepatide (MOUNJARO) 5 MG/0.5ML Subcutaneous Solution Pen-injector Inject 0.5 mL into the skin once a week. 2 mL 2    Tirzepatide (MOUNJARO) 2.5 MG/0.5ML Subcutaneous Solution Pen-injector Inject 2.5 mg into the skin once a week. 2 mL 1    mupirocin 2 % External Ointment Apply 1 Application topically 3 (three) times daily. 30 g 3    Norelgestromin-Eth Estradiol 150-35 MCG/24HR Transdermal Patch Weekly Place 1 patch onto the skin once a week. 3 patch 11    HYDROcodone-acetaminophen 5-325 MG Oral Tab Take 1 tablet by mouth every 6 (six) hours as needed. (Patient not taking: Reported on 2024) 30 tablet 0   [3]   Past Medical History:   ADHD (attention deficit hyperactivity disorder)     NO MEDS DURING PREG    Allergic rhinitis, cause unspecified    Anxiety    Bipolar 1 disorder (HCC)    NO MEDS DURING PREG    Bronchitis, not specified as acute or chronic    Essential hypertension    Gestational diabetes (HCC)    Infectious mononucleosis    Intractable vomiting with nausea, unspecified vomiting type    Otitis    Pneumonia    Pneumonia, organism unspecified(486)    as      Swollen tonsil   [4]   Past Surgical History:  Procedure Laterality Date           Contracept iud      IUD insertion 3/20/18 per Margaret/Dr. Lopez's office    D&c after delivery N/A 2022    Endoscopic ultrasound exam  2011    Tonsillectomy     [5]   Family History  Problem Relation Age of Onset    Diabetes Mother     Diabetes Father     Heart Disorder Father     Heart Attack Father     Hypertension Father     Diabetes Maternal Grandmother     Depression Maternal Grandmother     Anxiety Maternal Grandmother     Other (alzheimers) Maternal Grandmother     Skin cancer Maternal Grandmother     Diabetes Maternal Grandfather     Diabetes Paternal Grandmother     Other (bladder cancer) Paternal Grandmother    [6]   Social History  Socioeconomic History    Marital status:    Tobacco Use    Smoking status: Former     Current packs/day: 0.50     Average packs/day: 0.5 packs/day for 8.0 years (4.0 ttl pk-yrs)     Types: Cigarettes    Smokeless tobacco: Never   Vaping Use    Vaping status: Never Used   Substance and Sexual Activity    Alcohol use: Not Currently     Comment: very rare     Drug use: No    Sexual activity: Yes     Partners: Male   Other Topics Concern    Caffeine Concern Yes     Comment: 2 cups    Exercise Yes     Comment: 5 x a week.cardio    Seat Belt Yes     Social Drivers of Health     Food Insecurity: No Food Insecurity (2024)    Food Insecurity     Food Insecurity: Never true   Transportation Needs: No Transportation Needs (2024)    Transportation Needs     Lack of Transportation: No   Stress: No Stress Concern Present (2024)    Stress     Feeling of Stress : No   Housing Stability: Low Risk  (2024)    Housing Stability     Housing Instability: No

## 2025-08-04 ENCOUNTER — TELEPHONE (OUTPATIENT)
Dept: ANESTHESIOLOGY | Facility: HOSPITAL | Age: 30
End: 2025-08-04

## 2025-08-05 RX ORDER — INSULIN HUMAN 100 [IU]/ML
68 INJECTION, SUSPENSION SUBCUTANEOUS NIGHTLY
Qty: 15 ML | Refills: 1 | Status: SHIPPED | OUTPATIENT
Start: 2025-08-05

## 2025-08-19 RX ORDER — INSULIN HUMAN 100 [IU]/ML
68 INJECTION, SUSPENSION SUBCUTANEOUS NIGHTLY
Qty: 15 ML | Refills: 1 | Status: SHIPPED | OUTPATIENT
Start: 2025-08-19 | End: 2025-08-25

## 2025-08-25 ENCOUNTER — OFFICE VISIT (OUTPATIENT)
Dept: PERINATAL CARE | Facility: HOSPITAL | Age: 30
End: 2025-08-25
Attending: OBSTETRICS & GYNECOLOGY

## 2025-08-25 ENCOUNTER — HOSPITAL ENCOUNTER (OUTPATIENT)
Facility: HOSPITAL | Age: 30
Discharge: HOME | End: 2025-08-25
Attending: OBSTETRICS & GYNECOLOGY | Admitting: OBSTETRICS & GYNECOLOGY

## 2025-08-25 VITALS
BODY MASS INDEX: 47.09 KG/M2 | RESPIRATION RATE: 18 BRPM | HEIGHT: 66 IN | SYSTOLIC BLOOD PRESSURE: 156 MMHG | HEART RATE: 98 BPM | DIASTOLIC BLOOD PRESSURE: 90 MMHG | WEIGHT: 293 LBS

## 2025-08-25 VITALS
HEIGHT: 66 IN | SYSTOLIC BLOOD PRESSURE: 165 MMHG | WEIGHT: 293 LBS | BODY MASS INDEX: 47.09 KG/M2 | DIASTOLIC BLOOD PRESSURE: 100 MMHG | HEART RATE: 106 BPM

## 2025-08-25 DIAGNOSIS — E66.01 MORBID OBESITY (HCC): ICD-10-CM

## 2025-08-25 DIAGNOSIS — O16.3 HYPERTENSION AFFECTING PREGNANCY IN THIRD TRIMESTER (HCC): ICD-10-CM

## 2025-08-25 DIAGNOSIS — E66.01 MORBID OBESITY DUE TO EXCESS CALORIES (HCC): ICD-10-CM

## 2025-08-25 DIAGNOSIS — O99.213 MATERNAL MORBID OBESITY IN THIRD TRIMESTER, ANTEPARTUM (HCC): ICD-10-CM

## 2025-08-25 DIAGNOSIS — E66.01 MATERNAL MORBID OBESITY IN THIRD TRIMESTER, ANTEPARTUM (HCC): ICD-10-CM

## 2025-08-25 DIAGNOSIS — E66.01 MORBID OBESITY (HCC): Primary | ICD-10-CM

## 2025-08-25 DIAGNOSIS — O24.419 GESTATIONAL DIABETES MELLITUS (GDM) IN THIRD TRIMESTER, GESTATIONAL DIABETES METHOD OF CONTROL UNSPECIFIED (HCC): ICD-10-CM

## 2025-08-25 DIAGNOSIS — Z87.59 HISTORY OF GESTATIONAL HYPERTENSION: ICD-10-CM

## 2025-08-25 LAB
ALBUMIN SERPL-MCNC: 4 G/DL (ref 3.2–4.8)
ALBUMIN/GLOB SERPL: 1.6 (ref 1–2)
ALP LIVER SERPL-CCNC: 131 U/L (ref 37–98)
ALT SERPL-CCNC: <7 U/L (ref 10–49)
ANION GAP SERPL CALC-SCNC: 11 MMOL/L (ref 0–18)
AST SERPL-CCNC: <8 U/L (ref ?–34)
BASOPHILS # BLD AUTO: 0.06 X10(3) UL (ref 0–0.2)
BASOPHILS NFR BLD AUTO: 0.4 %
BILIRUB SERPL-MCNC: 0.2 MG/DL (ref 0.3–1.2)
BUN BLD-MCNC: 6 MG/DL (ref 9–23)
CALCIUM BLD-MCNC: 9.3 MG/DL (ref 8.7–10.6)
CHLORIDE SERPL-SCNC: 107 MMOL/L (ref 98–112)
CO2 SERPL-SCNC: 21 MMOL/L (ref 21–32)
CREAT BLD-MCNC: 0.59 MG/DL (ref 0.55–1.02)
CREAT UR-SCNC: 105 MG/DL
EGFRCR SERPLBLD CKD-EPI 2021: 125 ML/MIN/1.73M2 (ref 60–?)
EOSINOPHIL # BLD AUTO: 0.17 X10(3) UL (ref 0–0.7)
EOSINOPHIL NFR BLD AUTO: 1.2 %
ERYTHROCYTE [DISTWIDTH] IN BLOOD BY AUTOMATED COUNT: 14 %
FASTING STATUS PATIENT QL REPORTED: NO
GLOBULIN PLAS-MCNC: 2.5 G/DL (ref 2–3.5)
GLUCOSE BLD-MCNC: 127 MG/DL (ref 70–99)
HCT VFR BLD AUTO: 33 % (ref 35–48)
HGB BLD-MCNC: 11 G/DL (ref 12–16)
IMM GRANULOCYTES # BLD AUTO: 0.09 X10(3) UL (ref 0–1)
IMM GRANULOCYTES NFR BLD: 0.6 %
LYMPHOCYTES # BLD AUTO: 3.08 X10(3) UL (ref 1–4)
LYMPHOCYTES NFR BLD AUTO: 21.6 %
MCH RBC QN AUTO: 27.3 PG (ref 26–34)
MCHC RBC AUTO-ENTMCNC: 33.3 G/DL (ref 31–37)
MCV RBC AUTO: 81.9 FL (ref 80–100)
MONOCYTES # BLD AUTO: 0.7 X10(3) UL (ref 0.1–1)
MONOCYTES NFR BLD AUTO: 4.9 %
NEUTROPHILS # BLD AUTO: 10.15 X10 (3) UL (ref 1.5–7.7)
NEUTROPHILS # BLD AUTO: 10.15 X10(3) UL (ref 1.5–7.7)
NEUTROPHILS NFR BLD AUTO: 71.3 %
OSMOLALITY SERPL CALC.SUM OF ELEC: 287 MOSM/KG (ref 275–295)
PLATELET # BLD AUTO: 500 10(3)UL (ref 150–450)
POTASSIUM SERPL-SCNC: 3.8 MMOL/L (ref 3.5–5.1)
PROT SERPL-MCNC: 6.5 G/DL (ref 5.7–8.2)
PROT UR-MCNC: 21.6 MG/DL (ref ?–14)
PROT/CREAT UR-RTO: 0.21
RBC # BLD AUTO: 4.03 X10(6)UL (ref 3.8–5.3)
SODIUM SERPL-SCNC: 139 MMOL/L (ref 136–145)
URATE SERPL-MCNC: 4.2 MG/DL (ref 3.1–7.8)
WBC # BLD AUTO: 14.3 X10(3) UL (ref 4–11)

## 2025-08-25 PROCEDURE — 36415 COLL VENOUS BLD VENIPUNCTURE: CPT

## 2025-08-25 PROCEDURE — 82570 ASSAY OF URINE CREATININE: CPT | Performed by: OBSTETRICS & GYNECOLOGY

## 2025-08-25 PROCEDURE — 59025 FETAL NON-STRESS TEST: CPT

## 2025-08-25 PROCEDURE — 76819 FETAL BIOPHYS PROFIL W/O NST: CPT

## 2025-08-25 PROCEDURE — 99213 OFFICE O/P EST LOW 20 MIN: CPT

## 2025-08-25 PROCEDURE — 85025 COMPLETE CBC W/AUTO DIFF WBC: CPT | Performed by: OBSTETRICS & GYNECOLOGY

## 2025-08-25 PROCEDURE — 84550 ASSAY OF BLOOD/URIC ACID: CPT | Performed by: OBSTETRICS & GYNECOLOGY

## 2025-08-25 PROCEDURE — 80053 COMPREHEN METABOLIC PANEL: CPT | Performed by: OBSTETRICS & GYNECOLOGY

## 2025-08-25 PROCEDURE — 84156 ASSAY OF PROTEIN URINE: CPT | Performed by: OBSTETRICS & GYNECOLOGY

## 2025-08-25 PROCEDURE — 76816 OB US FOLLOW-UP PER FETUS: CPT | Performed by: OBSTETRICS & GYNECOLOGY

## 2025-08-25 RX ORDER — INSULIN GLARGINE-YFGN 100 [IU]/ML
72 INJECTION, SOLUTION SUBCUTANEOUS NIGHTLY
Qty: 15 ML | Refills: 2 | Status: SHIPPED | OUTPATIENT
Start: 2025-08-25

## (undated) DEVICE — AGENT HEMSTAT 4X4IN OXIDIZED REGENERATED

## (undated) DEVICE — SYSTEM RETEN PANNUS W/ ADH PD MED GRD HK LOOP

## (undated) NOTE — ED AVS SNAPSHOT
Peg Stone   MRN: [id number]    Department:  [facility] in [place]   Date of Visit:  9/26/2018           Disclosure     Insurance plans vary and the physician(s) referred by the ER may not be covered by your plan.  Please contac tell this physician (or your personal doctor if your instructions are to return to your personal doctor) about any new or lasting problems. The primary care or specialist physician will see patients referred from the [facility] Emergency Department.  Fiona Flynn

## (undated) NOTE — LETTER
2024      Colby Whitehead, DO  [address]  [facility] [address]  Via In Basket  Patient: Sharlene Solo  : 1995    Dear Colleague:  Thank you for referring your patient to me for a Maternal Fetal Medicine evaluation. Please see my attached note for my findings and recommendations.      Should you have any questions or concerns, please do not hesitate to contact me at the number listed below.    Best Regards,      Harriett Boles MD  [facility]  [address]  [facility] [address]  [phone]    cc: No Recipients      [facility] Department of Maternal Fetal Medicine  Patient Name: Sharlene Solo  Patient : 1995  Physician: Harriett Boles MD    Outpatient Maternal-Fetal Medicine Follow-Up     Dear Dr. Whitehead,     Thank you for requesting ultrasound evaluation and maternal fetal medicine consultation on your patient Sharlene Solo.  As you are aware she is a 29 year old female  with a Pat pregnancy and an Estimated Date of Delivery: 24.  She returned to maternal-fetal medicine today for a follow-up visit and new consultation for gestational diabetes.  Her history was reviewed from her prior visit and there were no interval changes other than she has been diagnosed with diabetes of pregnancy.     Antepartum Risk Factors  Maternal morbid obesity  Chronic hypertension  PCOS  Gestational diabetes    S: no complaints.  Feeling FM.  She takes metformin 500 mg every evening.  She stopped for 1 week prior to taking the 1 hour glucose tolerance test.  She took that few weeks ago and reports that the result was 110 mg/dL.  She has now been taking the metformin again and is finding her fasting blood sugars are .  After breakfast her numbers are generally within range after lunch she will occasionally have a reading in the 120 range.  After dinner 5 out of 11 are 120-125 mg/dL    PHYSICAL EXAMINATION:  /81 (BP Location: Right arm,  Patient Position: Sitting, Cuff Size: large)   Pulse 97   Ht 5' 6\" (1.676 m)   Wt 283 lb (128.4 kg)   LMP 2023 (Exact Date)   BMI 45.68 kg/m²   General: alert and oriented, no acute distress  Abdomen: gravid, soft, non-tender  Extremities: non-tender, no edema     OBSTETRIC ULTRASOUND  The patient had a fetal growth ultrasound today which I interpreted the results and reviewed them with the patient.    Ultrasound Findings:  Single IUP in breech presentation.    Placenta is posterior.   Cardiac activity is present at 151 bpm  EFW 1100 g ( 2 lb 7 oz); 67%.    MVP is 4.5 cm . MERARI 11.9 cm    The fetal measurements are consistent with established EDC. No gross ultrasound evidence of structural abnormalities are seen today. The patient understands that ultrasound cannot rule out all structural and chromosomal abnormalities.     See imaging tab for the complete US report.       DISCUSSION  During her visit we discussed and reviewed the following issues:     OBESITY:   Her BMI prior to pregnancy was greater than 40  Obesity during pregnancy is associated with numerous maternal and  risks which were discussed previously and reviewed.   See [facility] note from 2024 for detailed review.     -----------------  HYPERTENSION  Patient Review  The patient has a history of hypertension.  She is controlled on labetalol 200 twice a day.    Discussion:  We reviewed the potential implications associated with chronic hypertension.   I informed the patient that chronic hypertension increases the risk of pre-eclampsia, placental abruption, IUGR, fetal birth defects (cardiac and neural tube), and fetal demise and possible need for  delivery.  The signs and symptoms of preeclampsia were discussed.   We also discussed the potential risks and benefits of low dose aspirin and anti-hypertensive medication.  She understands that a better diet, weight loss and routine exercise can help her blood pressure for the rest of  her life.  We discussed the morbidity associated with hypertension including heart disease, strokes and kidney disease.   See [facility] note from 1/22/2024 for detailed review.      BP Review  The patient is presently taking anti-hypertensive medications.  Her blood pressure readings at home have been 120's/80's.       Medical Regimen Recommendation: Continue labetalol and low-dose ASA.     Continued medication use and home blood pressure assessments were reviewed as well as recommendations for serial growth ultrasounds and antepartum testing.       Prevention of Preeclampsia  The role of low-dose aspirin to prevent preeclampsia was discussed previously and reviewed.  See M note from 1/22/2024 for detailed review.     --------------------------------  PCOS & Metformin use -    Pregnant women with PCOS appear to be at increased risk of developing gestational discussed previously.  See M note from 1/22/2024 for detailed review    GESTATIONAL DIABETES    She reports her 1 hour glucose tolerance test was 210 mg/dL    The patient was informed of the potential implications and risks associated with GDM to her and her fetus, especially when poorly controlled. We discussed the increased incidence of macrosomia and the potential for related birth injury to her and her baby. We talked about the increase risks associated risk of fetal hyperinsulinemia, jaundice, electrolyte imbalance, seizure activity, IUFD and other adverse obstetric outcomes. We also reviewed her  increased risk of having diabetes later in life. The importance of good glycemic control and avoidance of prolonged hypoglycemia and hyperglycemia was addressed.    She was instructed on the diabetic diet; her diet was modified to provide three meals and three snacks with fewer carbohydrates.  She received instruction on self-monitoring of blood glucose.  She was advised to assess  her blood sugars four times daily (fasting and 2 hour postprandially).   Fasting blood  glucose should be less than 95 mg/dl and two hour postprandial values should be less than 120 mg/dl.   She was advised to send her capillary blood glucose records to our office for weekly MFM review.     Her capillary blood glucose records were reviewed today; her compliance with the recommended four times daily assessments (fasting and two-hour post-prandial) is excellent.   Her overall glucose control is inadequate.    The patient is presently on diet therapy; her compliance with her diabetic diet regimen was reviewed and it is fair.     The patient is presently taking Metformin 500 mg every evening after dinner; her compliance with her medication regimen was reviewed and it is good.     We compared and contrasted insulin (long and short acting) and metformin to manage blood sugars in pregnancy.  We discussed insulin as the gold standard therapy in pregnancy and that it does not cross to the fetal circulation.  Metformin is increasingly being used in pregnancy but the long term data on / childhood effects are lacking.  Metformin crosses the placenta and  levels are >70% of the maternal level at delivery.  Metformin is not associated with increased rates for NICU admission,  hypoglycemia or LGA when its use controls the blood sugars to the recommended targets.  There is emerging information,however, that there is an increase in childhood obesity, and possibly metabolic syndrome in children exposed to Metformin throughout the entire pregnancy.  Currently, use in the third trimester for management of gestational diabetes is within the standard of care.     Based on the above discussion and the need for improved glycemic control, Irma is agreeable to going on insulin.  She will also stop the metformin.  She reports that it does give her stomach upset so she is happy to stop it.      Medical Regimen Recommendation:   Continue ADA diet; improvements in her bedtime snack were suggested  Stop  metformin  Glargine  25 units at bedtime  Send blood sugar logs to [facility] weekly for review     We discussed the recommended plan of care based on her  risk factors.  Sharlene had her questions answered to her satisfaction.    IMPRESSION:  IUP at 26w4d  Normal fetal growth  Maternal morbid obesity  Chronic hypertension complicating pregnancy  PCOS  Gestational diabetes, A2  History of  for failure to progress     RECOMMENDATIONS:  Continue care with Dr. Whitehead  Weekly NST at 32 weeks, increase to twice weekly at 34 weeks if medications are required to control her diabetes  Monthly growth and BPP ultrasound  Continue labetalol and low-dose aspirin  Begin sending blood sugar logs to [facility] weekly for review  Insulin as noted above  Stop metformin  Delivery is advised at 38 to 39 weeks unless an indication for earlier delivery arises  Daily low-dose aspirin      Total time spent 40 minutes this calendar day which includes preparing to see the patient including chart review, obtaining and/or reviewing additional medical history, performing a physical exam and evaluation, documenting clinical information in the electronic medical record, independently interpreting results, counseling the patient, communicating results to the patient/family/caregiver and coordinating care.     Case discussed with patient who demonstrated understanding and agreement with plan.     Thank you for allowing me to participate in the care of this patient.  Please feel free to contact me with any questions.    Harriett Boles MD  Maternal-Fetal Medicine       Note to patient and family:  The 21st Century Cures Act makes medical notes available to patients in the interest of transparency.  However, please be advised that this is a medical document.  It is intended as a peer to peer communication.  It is written in medical language and may contain abbreviations or verbiage that are technical and unfamiliar.  It may appear blunt or direct.   Medical documents are intended to carry relevant information, facts as evident, and the clinical opinion of the practitioner.      Pt here for GDM consult/Growth Ultrasound  +fm noted per patient  Pt denies complaints.

## (undated) NOTE — MR AVS SNAPSHOT
[place] [address] [address] [facility] [address]  [phone]               Thank you for choosing us for your health care visit with Cristopher Latham MD.  We are glad to serve you and happy to provide you with this summary of For medical emergencies, dial 911. Educational Information     Healthy Diet and Regular Exercise  [employer] for making healthy food choices  -   Enjoy your food, but eat less. Fully enjoy your food when eating.    Don’t

## (undated) NOTE — LETTER
2024      Colby Whitehead, DO  [address]  [facility] [address]  Via In Basket  Patient: Sharlene Solo  : 1995    Dear Colleague:  Thank you for referring your patient to me for a Maternal Fetal Medicine evaluation. Please see my attached note for my findings and recommendations.      Should you have any questions or concerns, please do not hesitate to contact me at the number listed below.    Best Regards,      Harriett Boles MD  [facility]   [address]  [facility] [address]  [phone]    cc: No Recipients      [facility] Department of Maternal Fetal Medicine  Patient Name: Sharlene Solo  Patient : 1995  Physician: Harriett Boles MD    Pt here for growth ultrasound  + FM  No complaints    Outpatient Maternal-Fetal Medicine Follow-Up     Dear Dr. Whitehead,     Thank you for requesting ultrasound evaluation and maternal fetal medicine consultation on your patient Sharlene Solo.  As you are aware she is a 29 year old female  with a Pat pregnancy and an Estimated Date of Delivery: 24.  She returned to maternal-fetal medicine today for a follow-up visit and new consultation for gestational diabetes.  Her history was reviewed from her prior visit and there were no interval changes other than she has been diagnosed with diabetes of pregnancy.     Antepartum Risk Factors  Maternal morbid obesity  Chronic hypertension  PCOS  Gestational diabetes, A2    S: no complaints.  She is very pleased that her blood sugars are now normalized with the glargine insulin at night.  She has no headaches, swelling, or nausea vomiting.  She reports good fetal movement    PHYSICAL EXAMINATION:  /86 (BP Location: Left arm, Patient Position: Sitting, Cuff Size: large)   Pulse 103   Ht 5' 6\" (1.676 m)   Wt 281 lb (127.5 kg)   LMP 2023 (Exact Date)   BMI 45.35 kg/m²   141/98 P - 98  General: alert and oriented, no acute  distress  Abdomen: obese, gravid, soft, non-tender  Extremities: non-tender, no edema     OBSTETRIC ULTRASOUND  The patient had a fetal growth 7 BPP ultrasound today which I interpreted the results and reviewed them with the patient.    Ultrasound Findings:  Single IUP in cephalic presentation.    Placenta is posterior.   A 3 vessel cord is noted.  Cardiac activity is present at 150 bpm  EFW 1859 g ( 4 lb 2 oz); 68%.    MERARI is  14.9 cm.  MVP is 4.8 cm  BPP is 8/8.     The fetal measurements are consistent with established EDC. No gross ultrasound evidence of structural abnormalities are seen today. The patient understands that ultrasound cannot rule out all structural and chromosomal abnormalities.     See imaging tab for the complete US report.       DISCUSSION  During her visit we discussed and reviewed the following issues:     OBESITY:   Her BMI prior to pregnancy was greater than 40  Obesity during pregnancy is associated with numerous maternal and  risks which were discussed previously and reviewed.   See MFM note from 2024 for detailed review.     -----------------  HYPERTENSION  Patient Review  The patient has a history of hypertension.  She is controlled on labetalol 200 twice a day.    Discussion:  We reviewed the potential implications associated with chronic hypertension.   I informed the patient that chronic hypertension increases the risk of pre-eclampsia, placental abruption, IUGR, fetal birth defects (cardiac and neural tube), and fetal demise and possible need for  delivery.  The signs and symptoms of preeclampsia were discussed.   We also discussed the potential risks and benefits of low dose aspirin and anti-hypertensive medication.  She understands that a better diet, weight loss and routine exercise can help her blood pressure for the rest of her life.  We discussed the morbidity associated with hypertension including heart disease, strokes and kidney disease.   See MFM note  from 1/22/2024 for detailed review.      BP Review  The patient is presently taking anti-hypertensive medications.  She reports that she has not been taking her blood pressure at home     Medical Regimen Recommendation: Continue labetalol and low-dose ASA.     Continued medication use and home blood pressure assessments were reviewed as well as recommendations for serial growth ultrasounds and antepartum testing.       Prevention of Preeclampsia  The role of low-dose aspirin to prevent preeclampsia was discussed previously and reviewed.  See M note from 1/22/2024 for detailed review.     --------------------------------  PCOS & Metformin use -    Pregnant women with PCOS appear to be at increased risk of developing gestational discussed previously.  See MFM note from 1/22/2024 for detailed review    GESTATIONAL DIABETES - follow up    She reports her 1 hour glucose tolerance test was 210 mg/dL    We reviewed the potential implications and risks associated with GDM to her and her fetus, especially when poorly controlled. We discussed the increased incidence of macrosomia and the potential for related birth injury to her and her baby. We talked about the increase risks associated risk of fetal hyperinsulinemia, jaundice, electrolyte imbalance, seizure activity, IUFD and other adverse obstetric outcomes. We also reviewed her  increased risk of having diabetes later in life. The importance of good glycemic control and avoidance of prolonged hypoglycemia and hyperglycemia was addressed.  See M note from 3/5/2024 for detailed review      Her capillary blood glucose records were reviewed today; her compliance with the recommended four times daily assessments (fasting and two-hour post-prandial) is excellent.   Her overall glucose control is good.    The patient is presently on diet therapy; her compliance with her diabetic diet regimen was reviewed and it is fair.     The patient is presently taking glargine insulin 32  units at bedtime.      Medical Regimen Recommendation:   Continue ADA diet; improvements in her bedtime snack were suggested  Glargine  32 units at bedtime  Send blood sugar logs to [facility] weekly for review     We discussed the recommended plan of care based on her  risk factors.  Sharlene had her questions answered to her satisfaction.    IMPRESSION:  IUP at 30w4d  Normal fetal growth and  testing  Maternal morbid obesity  Chronic hypertension complicating pregnancy; elevated blood pressure reading x 2 in the office today  PCOS  Gestational diabetes, A2  History of  for failure to progress     RECOMMENDATIONS:  Continue care with Dr. Whitehead  Weekly NST at 32 weeks, increase to twice weekly at 34 weeks if medications are required to control her diabetes  Monthly growth and BPP ultrasound  Continue labetalol and low-dose aspirin  Begin sending blood sugar logs to [facility] weekly for review  Insulin as noted above  Stop metformin  Delivery is advised at 38 to 39 weeks unless an indication for earlier delivery arises  Daily low-dose aspirin   She was sent to triage for preeclampsia evaluation.  Dr. Whitehead notified.  Will not administer betamethasone at this time unless she is going to be admitted for preeclampsia     Total time spent 40 minutes this calendar day which includes preparing to see the patient including chart review, obtaining and/or reviewing additional medical history, performing a physical exam and evaluation, documenting clinical information in the electronic medical record, independently interpreting results, counseling the patient, communicating results to the patient/family/caregiver and coordinating care.     Case discussed with patient who demonstrated understanding and agreement with plan.     Thank you for allowing me to participate in the care of this patient.  Please feel free to contact me with any questions.    Harriett Boles MD  Maternal-Fetal Medicine       Note to patient  and family:  The 21st Century Cures Act makes medical notes available to patients in the interest of transparency.  However, please be advised that this is a medical document.  It is intended as a peer to peer communication.  It is written in medical language and may contain abbreviations or verbiage that are technical and unfamiliar.  It may appear blunt or direct.  Medical documents are intended to carry relevant information, facts as evident, and the clinical opinion of the practitioner.

## (undated) NOTE — LETTER
12/05/18        [address]  [facility] [address]      Dear [facility],    Our records indicate that you have outstanding lab work and or testing that was ordered for you and has not yet been completed:  Orders Placed This Encounter        [employer]

## (undated) NOTE — LETTER
Date: 3/12/2020    Patient Name: Fidelina Dave      To Whom it may concern: The above patient was seen at the [facility] for treatment of a medical condition.     This patient should be excused from attending work until fever free for 24

## (undated) NOTE — ED AVS SNAPSHOT
THE [facility] Emergency Department in [address] [facility]    Phone:  [phone]    Fax:  [address] [facility]   MRN: [id number]    Department:  THE [facility] Emergency Department in [place]   Date of Visit: Pediatric [phone] Emergency Department   [phone]       To Check ER Wait Times:  TEXT 'ERwait' to 81732      Click [email or web address]      Or call [phone]    If you have any problems with your follow-up, please call our case [facility] before you leave. After you leave, you should follow the attached instructions. I have read and understand the instructions given to me by my caregivers. 24-Hour Pharmacies        Pharmacy Address Phone Number   [address] Final result    Impression:    CONCLUSION:  No acute bony injury to the left knee. Dictated by: Thong Anaya MD on 4/25/2017 at 16:22       Approved by:  Thong Anaya MD              Narrative:    PROCEDURE:  XR KNEE ROUTINE (3 VIEWS), LEFT (CPT=73

## (undated) NOTE — ED AVS SNAPSHOT
Steven Bauman   MRN: [id number]    Department:  [address] [facility] in [place]   Date of Visit:  8/28/2017           Disclosure     Insurance plans vary and the physician(s) referred by the ER may not be covered by your plan.  Please contac If you have been prescribed any medication(s), please fill your prescription right away and begin taking the medication(s) as directed    If the emergency physician has read X-rays, these will be re-interpreted by a radiologist.  If there is a significant

## (undated) NOTE — ED AVS SNAPSHOT
THE [facility] Emergency Department in [address] [facility]    Phone:  [phone]    Fax:  [address] [facility]   MRN: [id number]    Department:  THE [facility] Emergency Department in [place]   Date of Visit: IF THERE IS ANY CHANGE OR WORSENING OF YOUR CONDITION, CALL YOUR PRIMARY CARE PHYSICIAN AT ONCE OR RETURN IMMEDIATELY TO THE EMERGENCY DEPARTMENT.     If you have been prescribed any medication(s), please fill your prescription right away and begin taking t

## (undated) NOTE — LETTER
03/02/18        [address]  [place] [address]      Dear Maycol Rios,    [facility] records indicate that you have lab work and or testing that was ordered for you and has not yet been completed:          CBC W Differential W Platelet [E]      Comp Me

## (undated) NOTE — LETTER
2024      Colby Whitehead, DO  [address]  [facility] [address]  Via In Basket  Patient: Sharlene Solo  : 1995    Dear Colleague:  Thank you for referring your patient to me for a Maternal Fetal Medicine evaluation. Please see my attached note for my findings and recommendations.      Should you have any questions or concerns, please do not hesitate to contact me at the number listed below.    Best Regards,      Harriett Boles MD  [facility]  [address]  [facility] [address]  [phone]    cc: No Recipients      [facility] Department of Maternal Fetal Medicine  Patient Name: Sharlene Solo  Patient : 1995  Physician: Harriett Boles MD    Pt here for Growth Ultrasound  +fm noted per patient  Pt denies complaints at this time.    Outpatient Maternal-Fetal Medicine Follow-Up     Dear Dr. Whitehead,     Thank you for requesting ultrasound evaluation and maternal fetal medicine consultation on your patient Sharlene Solo.  As you are aware she is a 29 year old female  with a Pat pregnancy and an Estimated Date of Delivery: 24.  She returned to maternal-fetal medicine today for a follow-up visit and new consultation for gestational diabetes.  Her history was reviewed from her prior visit and there were no interval changes other than she has been diagnosed with diabetes of pregnancy.     Antepartum Risk Factors  Maternal morbid obesity  Chronic hypertension  PCOS  Gestational diabetes, A2    S: no complaints.  She has no headaches, swelling, or nausea vomiting.  She reports good fetal movement    Her home blood pressure readings have been in the mid 120s to 155 systolic over 90s diastolic    PHYSICAL EXAMINATION:  /79 (BP Location: Right arm, Patient Position: Sitting, Cuff Size: large)   Ht 5' 6\" (1.676 m)   Wt 286 lb (129.7 kg)   LMP 2023 (Exact Date)   BMI 46.16 kg/m²   141/98 P - 98  General: alert and  oriented, no acute distress  Abdomen: obese, gravid, soft, non-tender  Extremities: non-tender, no edema     OBSTETRIC ULTRASOUND  The patient had a fetal growth & BPP ultrasound today which I interpreted the results and reviewed them with the patient.    Ultrasound Findings:    Single IUP in cephalic presentation.    Placenta is posterior.   A 3 vessel cord is noted.  Cardiac activity is present at 151 bpm  EFW 3001 g ( 6 lb 10 oz); 80%.  AC 92%ile; HA >99%ile  MERARI is  14.8 cm.  MVP is 4.6 cm  BPP is 8/8.     The fetal measurements are consistent with established EDC. No gross ultrasound evidence of structural abnormalities are seen today. The patient understands that ultrasound cannot rule out all structural and chromosomal abnormalities.     See imaging tab for the complete US report.       DISCUSSION  During her visit we discussed and reviewed the following issues:     OBESITY:   Her BMI prior to pregnancy was greater than 40  Obesity during pregnancy is associated with numerous maternal and  risks which were discussed previously and reviewed.   See [facility] note from 2024 for detailed review.     -----------------  HYPERTENSION  Patient Review  The patient has a history of hypertension.  She is controlled on labetalol 200 twice a day.    Discussion:  We reviewed the potential implications associated with chronic hypertension.   I informed the patient that chronic hypertension increases the risk of pre-eclampsia, placental abruption, IUGR, fetal birth defects (cardiac and neural tube), and fetal demise and possible need for  delivery.  The signs and symptoms of preeclampsia were discussed.   We also discussed the potential risks and benefits of low dose aspirin and anti-hypertensive medication.  She understands that a better diet, weight loss and routine exercise can help her blood pressure for the rest of her life.  We discussed the morbidity associated with hypertension including heart disease,  strokes and kidney disease.   See M note from 1/22/2024 for detailed review.      BP Review  The patient is presently taking anti-hypertensive medications.  She reports that she has not been taking her blood pressure at home     Medical Regimen Recommendation: Continue labetalol and low-dose ASA.     Continued medication use and home blood pressure assessments were reviewed as well as recommendations for serial growth ultrasounds and antepartum testing.       Prevention of Preeclampsia  The role of low-dose aspirin to prevent preeclampsia was discussed previously and reviewed.  See MFM note from 1/22/2024 for detailed review.     --------------------------------  PCOS & Metformin use -    Pregnant women with PCOS appear to be at increased risk of developing gestational discussed previously.  See MFM note from 1/22/2024 for detailed review    GESTATIONAL DIABETES - follow up    She reports her 1 hour glucose tolerance test was 210 mg/dL    We reviewed the potential implications and risks associated with GDM to her and her fetus, especially when poorly controlled. We discussed the increased incidence of macrosomia and the potential for related birth injury to her and her baby. We talked about the increase risks associated risk of fetal hyperinsulinemia, jaundice, electrolyte imbalance, seizure activity, IUFD and other adverse obstetric outcomes. We also reviewed her  increased risk of having diabetes later in life. The importance of good glycemic control and avoidance of prolonged hypoglycemia and hyperglycemia was addressed.  See MFM note from 3/5/2024 for detailed review      Her capillary blood glucose records were reviewed today; her compliance with the recommended four times daily assessments (fasting and two-hour post-prandial) is excellent.   Her overall glucose control is good except for the elevated FBS    The patient is presently on diet therapy; her compliance with her diabetic diet regimen was reviewed  and it is fair.     The patient is presently taking semglee 40 units at bedtime.      Medical Regimen Recommendation:   Continue ADA diet  Semglee 48 units at bedtime  Send blood sugar logs to [facility] weekly for review     We discussed the recommended plan of care based on her  risk factors.  Sharlene had her questions answered to her satisfaction.    IMPRESSION:  IUP at 34w4d  Normal fetal growth and  testing  Maternal morbid obesity  Chronic hypertension complicating pregnancy; elevated blood pressure reading x 2 in the office today  PCOS  Gestational diabetes, A2  History of  for failure to progress     RECOMMENDATIONS:  Continue care with Dr. Whitehead  Twice weekly NST   Continue labetalol and low-dose aspirin  Sending blood sugar logs to [facility] weekly for review  Insulin as noted above  Delivery is advised at 37 weeks due to GDM A2 and chronic HTN with superimposed gestational HTN (without severe features)     Total time spent 30 minutes this calendar day which includes preparing to see the patient including chart review, obtaining and/or reviewing additional medical history, performing a physical exam and evaluation, documenting clinical information in the electronic medical record, independently interpreting results, counseling the patient, communicating results to the patient/family/caregiver and coordinating care.     Case discussed with patient who demonstrated understanding and agreement with plan.     Thank you for allowing me to participate in the care of this patient.  Please feel free to contact me with any questions.    Harriett Boles MD  Maternal-Fetal Medicine       Note to patient and family:  The 21st Century Cures Act makes medical notes available to patients in the interest of transparency.  However, please be advised that this is a medical document.  It is intended as a peer to peer communication.  It is written in medical language and may contain abbreviations or verbiage that  are technical and unfamiliar.  It may appear blunt or direct.  Medical documents are intended to carry relevant information, facts as evident, and the clinical opinion of the practitioner.

## (undated) NOTE — ED AVS SNAPSHOT
Efe Standard   MRN: [id number]    Department:  [address] [facility] Department in [place]   Date of Visit:  1/25/2018           Disclosure     Insurance plans vary and the physician(s) referred by the ER may not be covered by your plan.  Please contac tell this physician (or your personal doctor if your instructions are to return to your personal doctor) about any new or lasting problems. The primary care or specialist physician will see patients referred from the [facility] Emergency Department.  Cedrick Hendricks

## (undated) NOTE — MR AVS SNAPSHOT
[address] [id number] [phone]               Thank you for choosing us for your health care visit with Tuyet Marrero MD.  We are glad to serve you and happy to provide you with this summary of Miko Morales M.D.  [address] [place], [facility]    Phone: [phone].   [phone]          Allergies as of Jun 01, 2017     Augmentin [Amoxicillin-Pot Clavulanate] Rash    Penicillins Unknown                Today's Vital Signs

## (undated) NOTE — LETTER
Date: 6/27/2018    Patient Name: Karen Leone          To Whom it may concern: The above patient was seen at the [facility] for treatment of a medical condition.     This patient should be excused from attending work 6/27/18 through 6/28

## (undated) NOTE — LETTER
Patient Name: Silva Paget  YOB: 1995          MRN number:  [id number]  Date:  4/26/2021  Referring Physician:  Ayo Woods